# Patient Record
Sex: MALE | Race: WHITE | NOT HISPANIC OR LATINO | Employment: OTHER | URBAN - METROPOLITAN AREA
[De-identification: names, ages, dates, MRNs, and addresses within clinical notes are randomized per-mention and may not be internally consistent; named-entity substitution may affect disease eponyms.]

---

## 2017-01-09 ENCOUNTER — COMMUNICATION - HEALTHEAST (OUTPATIENT)
Dept: FAMILY MEDICINE | Facility: CLINIC | Age: 67
End: 2017-01-09

## 2017-01-09 DIAGNOSIS — G47.00 INSOMNIA, UNSPECIFIED: ICD-10-CM

## 2017-03-24 ENCOUNTER — OFFICE VISIT - HEALTHEAST (OUTPATIENT)
Dept: FAMILY MEDICINE | Facility: CLINIC | Age: 67
End: 2017-03-24

## 2017-03-24 DIAGNOSIS — J30.9 ALLERGIC RHINITIS: ICD-10-CM

## 2017-03-24 DIAGNOSIS — E29.1 HYPOGONADISM IN MALE: ICD-10-CM

## 2017-03-24 DIAGNOSIS — R29.890 HEIGHT LOSS: ICD-10-CM

## 2017-03-24 DIAGNOSIS — Z00.00 ROUTINE GENERAL MEDICAL EXAMINATION AT A HEALTH CARE FACILITY: ICD-10-CM

## 2017-03-24 DIAGNOSIS — G47.00 INSOMNIA, UNSPECIFIED: ICD-10-CM

## 2017-03-24 DIAGNOSIS — N40.0 BPH (BENIGN PROSTATIC HYPERTROPHY): ICD-10-CM

## 2017-03-24 DIAGNOSIS — E78.5 HYPERLIPIDEMIA: ICD-10-CM

## 2017-03-24 DIAGNOSIS — I10 ESSENTIAL HYPERTENSION, BENIGN: ICD-10-CM

## 2017-03-24 LAB
CHOLEST SERPL-MCNC: 189 MG/DL
FASTING STATUS PATIENT QL REPORTED: YES
HDLC SERPL-MCNC: 57 MG/DL
LDLC SERPL CALC-MCNC: 109 MG/DL
PSA SERPL-MCNC: 3.3 NG/ML (ref 0–4.5)
TRIGL SERPL-MCNC: 117 MG/DL

## 2017-03-24 ASSESSMENT — MIFFLIN-ST. JEOR: SCORE: 1570.93

## 2017-04-09 ENCOUNTER — COMMUNICATION - HEALTHEAST (OUTPATIENT)
Dept: FAMILY MEDICINE | Facility: CLINIC | Age: 67
End: 2017-04-09

## 2017-04-09 DIAGNOSIS — G47.00 INSOMNIA, UNSPECIFIED: ICD-10-CM

## 2017-04-10 ENCOUNTER — COMMUNICATION - HEALTHEAST (OUTPATIENT)
Dept: FAMILY MEDICINE | Facility: CLINIC | Age: 67
End: 2017-04-10

## 2017-04-10 DIAGNOSIS — E78.5 HYPERLIPIDEMIA: ICD-10-CM

## 2017-04-12 ENCOUNTER — COMMUNICATION - HEALTHEAST (OUTPATIENT)
Dept: FAMILY MEDICINE | Facility: CLINIC | Age: 67
End: 2017-04-12

## 2017-04-13 RX ORDER — SIMVASTATIN 40 MG
TABLET ORAL
Qty: 15 TABLET | Refills: 3 | Status: SHIPPED | OUTPATIENT
Start: 2017-04-13 | End: 2023-10-20

## 2017-04-14 ENCOUNTER — COMMUNICATION - HEALTHEAST (OUTPATIENT)
Dept: FAMILY MEDICINE | Facility: CLINIC | Age: 67
End: 2017-04-14

## 2017-04-19 RX ORDER — SIMVASTATIN 40 MG
TABLET ORAL
Qty: 45 TABLET | Refills: 1 | Status: SHIPPED | OUTPATIENT
Start: 2017-04-19 | End: 2023-10-20

## 2017-06-27 ENCOUNTER — COMMUNICATION - HEALTHEAST (OUTPATIENT)
Dept: FAMILY MEDICINE | Facility: CLINIC | Age: 67
End: 2017-06-27

## 2017-06-27 DIAGNOSIS — G47.00 INSOMNIA: ICD-10-CM

## 2017-06-28 RX ORDER — MIRTAZAPINE 45 MG/1
TABLET, FILM COATED ORAL
Qty: 30 TABLET | Refills: 5 | Status: SHIPPED | OUTPATIENT
Start: 2017-06-28

## 2017-07-08 ENCOUNTER — COMMUNICATION - HEALTHEAST (OUTPATIENT)
Dept: FAMILY MEDICINE | Facility: CLINIC | Age: 67
End: 2017-07-08

## 2017-07-08 DIAGNOSIS — G47.00 INSOMNIA, UNSPECIFIED: ICD-10-CM

## 2017-07-08 RX ORDER — TRAZODONE HYDROCHLORIDE 100 MG/1
TABLET ORAL
Qty: 180 TABLET | Refills: 1 | Status: SHIPPED | OUTPATIENT
Start: 2017-07-08

## 2021-01-07 ENCOUNTER — APPOINTMENT (OUTPATIENT)
Dept: URBAN - METROPOLITAN AREA CLINIC 256 | Age: 71
Setting detail: DERMATOLOGY
End: 2021-01-07

## 2021-01-07 VITALS — HEIGHT: 68 IN | RESPIRATION RATE: 18 BRPM | WEIGHT: 175 LBS

## 2021-01-07 DIAGNOSIS — L11.1 TRANSIENT ACANTHOLYTIC DERMATOSIS [GROVER]: ICD-10-CM

## 2021-01-07 DIAGNOSIS — Z85.828 PERSONAL HISTORY OF OTHER MALIGNANT NEOPLASM OF SKIN: ICD-10-CM

## 2021-01-07 DIAGNOSIS — I87.2 VENOUS INSUFFICIENCY (CHRONIC) (PERIPHERAL): ICD-10-CM

## 2021-01-07 PROBLEM — L30.9 DERMATITIS, UNSPECIFIED: Status: ACTIVE | Noted: 2021-01-07

## 2021-01-07 PROCEDURE — OTHER COUNSELING: OTHER

## 2021-01-07 PROCEDURE — OTHER PRESCRIPTION: OTHER

## 2021-01-07 PROCEDURE — 99203 OFFICE O/P NEW LOW 30 MIN: CPT

## 2021-01-07 RX ORDER — FUROSEMIDE 20 MG/1
TABLET ORAL QD
Qty: 30 | Refills: 0 | Status: ERX | COMMUNITY
Start: 2021-01-07

## 2021-01-07 RX ORDER — TRIAMCINOLONE ACETONIDE 1 MG/G
CREAM TOPICAL BID
Qty: 1 | Refills: 1 | Status: ERX | COMMUNITY
Start: 2021-01-07

## 2021-01-07 RX ORDER — PREDNISONE 20 MG/1
20MG TABLET ORAL QD-BID
Qty: 21 | Refills: 0 | Status: ERX | COMMUNITY
Start: 2021-01-07

## 2021-01-07 ASSESSMENT — LOCATION SIMPLE DESCRIPTION DERM
LOCATION SIMPLE: LEFT SHOULDER
LOCATION SIMPLE: RIGHT PRETIBIAL REGION
LOCATION SIMPLE: ABDOMEN
LOCATION SIMPLE: RIGHT PRETIBIAL REGION

## 2021-01-07 ASSESSMENT — LOCATION DETAILED DESCRIPTION DERM
LOCATION DETAILED: PERIUMBILICAL SKIN
LOCATION DETAILED: LEFT POSTERIOR SHOULDER
LOCATION DETAILED: RIGHT DISTAL PRETIBIAL REGION
LOCATION DETAILED: RIGHT DISTAL PRETIBIAL REGION

## 2021-01-07 ASSESSMENT — LOCATION ZONE DERM
LOCATION ZONE: TRUNK
LOCATION ZONE: LEG
LOCATION ZONE: ARM
LOCATION ZONE: LEG

## 2021-01-21 ENCOUNTER — APPOINTMENT (OUTPATIENT)
Dept: URBAN - METROPOLITAN AREA CLINIC 256 | Age: 71
Setting detail: DERMATOLOGY
End: 2021-01-21

## 2021-01-21 VITALS — HEIGHT: 68 IN | RESPIRATION RATE: 16 BRPM | WEIGHT: 170 LBS

## 2021-01-21 DIAGNOSIS — I87.2 VENOUS INSUFFICIENCY (CHRONIC) (PERIPHERAL): ICD-10-CM

## 2021-01-21 DIAGNOSIS — L82.0 INFLAMED SEBORRHEIC KERATOSIS: ICD-10-CM

## 2021-01-21 DIAGNOSIS — Z85.828 PERSONAL HISTORY OF OTHER MALIGNANT NEOPLASM OF SKIN: ICD-10-CM

## 2021-01-21 PROCEDURE — 99213 OFFICE O/P EST LOW 20 MIN: CPT

## 2021-01-21 PROCEDURE — OTHER PRESCRIPTION: OTHER

## 2021-01-21 PROCEDURE — OTHER COUNSELING: OTHER

## 2021-01-21 RX ORDER — TRIAMCINOLONE ACETONIDE 1 MG/G
CREAM TOPICAL BID
Qty: 1 | Refills: 1 | Status: ERX

## 2021-01-21 ASSESSMENT — LOCATION DETAILED DESCRIPTION DERM
LOCATION DETAILED: RIGHT DISTAL PRETIBIAL REGION
LOCATION DETAILED: LEFT POSTERIOR SHOULDER
LOCATION DETAILED: LEFT RIB CAGE
LOCATION DETAILED: RIGHT DISTAL PRETIBIAL REGION

## 2021-01-21 ASSESSMENT — LOCATION ZONE DERM
LOCATION ZONE: ARM
LOCATION ZONE: LEG
LOCATION ZONE: TRUNK
LOCATION ZONE: LEG

## 2021-03-09 ENCOUNTER — RX ONLY (RX ONLY)
Age: 71
End: 2021-03-09

## 2021-03-09 RX ORDER — FUROSEMIDE 20 MG/1
TABLET ORAL QD
Qty: 90 | Refills: 1 | Status: ERX

## 2021-05-30 VITALS — HEIGHT: 69 IN | WEIGHT: 182 LBS | BODY MASS INDEX: 26.96 KG/M2

## 2021-06-02 ENCOUNTER — RECORDS - HEALTHEAST (OUTPATIENT)
Dept: ADMINISTRATIVE | Facility: CLINIC | Age: 71
End: 2021-06-02

## 2021-06-09 NOTE — PROGRESS NOTES
" /76  Pulse 84  Resp 16  Ht 5' 9\" (1.753 m)  Wt 182 lb (82.6 kg)  BMI 26.88 kg/m2    Lamine Samano is a 66 y.o. male here for physical.  He has no specific concerns about his health.  He is previously injured Achilles tendons limit his ability to ambulate.  He uses a cane or walker with any distance greater than 50 feet.  He is not doing much in the way of exercise.  We reviewed heart disease prevention and cancer detection.  He would like me to take a look at a couple of skin lesions.  He has had a tremor at times but there is no family history of tremor or Parkinson's.  Active Ambulatory Problems     Diagnosis Date Noted     Basal Cell Carcinoma Of The Skin      Hypogonadism in male      Seborrheic Dermatitis      Benign Essential Hypertension      Alcohol Abuse      Hyperlipidemia      Alcoholism      Male Erectile Disorder      Insomnia      Snoring (Symptom)      Dermatitis      Hyperglycemia      Benign Adenomatous Polyp Of The Large Intestine      Allergic Rhinitis      Fatigue      Palpitations      Abnormal Blood Chemistry      Resolved Ambulatory Problems     Diagnosis Date Noted     No Resolved Ambulatory Problems     Past Medical History:   Diagnosis Date     Achilles tendon rupture      Alcohol abuse      Allergic rhinitis      Basal cell carcinoma of deltoid region, left      ED (erectile dysfunction)      HTN (hypertension)      Hypercholesterolemia      Insomnia      Seborrheic dermatitis      Snoring      Past Surgical History:   Procedure Laterality Date     [place] REMOVAL OF SPERM DUCT(S)      Description: Surgery Of Male Genitalia Vasectomy;  Recorded: 06/07/2009;     Family History   Problem Relation Age of Onset     Diabetes type II Brother      Diabetes Brother        Current Outpatient Prescriptions:      cholecalciferol, vitamin D3, (VITAMIN D3) 2,000 unit cap, Take by mouth., Disp: , Rfl:      fluocinonide-emollient (FLUOCINONIDE-EMOLLIENT) 0.05 % Crea, Apply topically 2 (two) times " a day. Apply and rub in a thin film to affected areas.. Morning and Evening, Disp: , Rfl:      guaiFENesin ER (MUCINEX) 600 mg 12 hr tablet, Take 1,200 mg by mouth 2 (two) times a day., Disp: , Rfl:      mirtazapine (REMERON) 45 MG tablet, TAKE ONE TABLET BY MOUTH EVERY NIGHT AT BEDTIME, Disp: 30 tablet, Rfl: 6     omega 3-dha-epa-fish oil 1,200 (144-216) mg cap, Take 1 capsule by mouth daily., Disp: , Rfl:      potassium gluconate 550 mg (90 mg) tablet, Take by mouth., Disp: , Rfl:      simvastatin (ZOCOR) 40 MG tablet, TAKE 1/2 TABLET BY MOUTH EVERY NIGHT AT BEDTIME, Disp: 15 tablet, Rfl: 3     traZODone (DESYREL) 100 MG tablet, TAKE 2 TABLETS BY MOUTH EVERY NIGHT AT BEDTIME, Disp: 180 tablet, Rfl: 0     aspirin 81 MG EC tablet, Take 81 mg by mouth daily., Disp: , Rfl:      fluocinonide (LIDEX) 0.05 % external solution, Apply topically 2 (two) times a day. Apply sparingly to scalp, Disp: , Rfl:      HYPOCHLOROUS ACID/SODIUM CHLOR (AVENOVA TOP), Apply topically., Disp: , Rfl:      triamcinolone (NASACORT AQ) 55 mcg nasal inhaler, 1 spray into each nostril 2 (two) times a day., Disp: 1 Inhaler, Rfl: 2  Allergies   Allergen Reactions     Levofloxacin Swelling     Penicillins Hives     Immunization History   Administered Date(s) Administered     Influenza high dose, seasonal 09/25/2015, 11/18/2016     Influenza, inj, historic 10/12/2007, 10/14/2008     Influenza, seasonal,quad inj 6-35 mos 09/14/2010, 10/06/2011, 10/25/2013, 10/30/2014     Pneumo Conj 13-V (2010&after) 09/25/2015     Pneumo Polysac 23-V 11/18/2016     Td, historic 11/11/2008     Tdap 11/11/2008     ZOSTER 11/01/2011     Social History     Social History     Marital status:      Spouse name: N/A     Number of children: N/A     Years of education: N/A     Occupational History     Not on file.     Social History Main Topics     Smoking status: Former Smoker     Smokeless tobacco: Not on file     Alcohol use Not on file     Drug use: Not on file      Sexual activity: Not on file     Other Topics Concern     Not on file     Social History Narrative     Habits: He is a non-smoker who does use alcohol 3 a day and caffeine 2 a day  His review of systems all otherwise negative    General Appearance:    Alert, cooperative, no distress, appears stated age   Head:    Normocephalic, without obvious abnormality, atraumatic   Eyes:    PERRL, conjunctiva/corneas clear, EOM's intact, fundi     benign, both eyes glasses are worn      Ears:    Normal TM's and external ear canals, both ears   Nose:   Nares normal, septum midline, mucosa normal, no drainage    or sinus tenderness   Throat:   Lips, mucosa, and tongue normal; teeth and gums normal   Neck:   Supple, symmetrical, trachea midline, no adenopathy;        thyroid:  No enlargement/tenderness/nodules; no carotid    bruit or JVD   Back:     Symmetric, no curvature, ROM normal, no CVA tenderness   Lungs:     Clear to auscultation bilaterally, respirations unlabored   Chest wall:    No tenderness or deformity   Heart:    Regular rate and rhythm, S1 and S2 normal, no murmur, rub   or gallop   Abdomen:     Soft, non-tender, bowel sounds active all four quadrants,     no masses, no organomegaly   Genitalia:    Normal male without hernia or other abnormality    Rectal:    Normal tone, mildly enlarged prostate otherwise normal    Extremities:   Extremities normal, atraumatic, no cyanosis or edema   Pulses:   2+ and symmetric all extremities   Skin:  on his mid back he has a 4 mm slightly raised well circumscribed skin lesion that looks like a seborrheic keratosis it does have 2 different pigment colors within the same overall and had asked him to watch this and we discussed the idea of freezing or biopsy if there is any change.  On his right mid axillary chest he has a 6 x 7 mm slightly raised slightly irregular appearing skin lesion that looks like again a seborrheic keratoses and on his right sideburn area he has a 2 mm  slightly dry appearing skin lesion that could be an actinic keratoses but he tells me it response to 1% hydrocortisone cream and so it could be a seborrheic dermatitis area as well and we discussed potential treatments of all of these areas should they grow or change.  The remainder of his skin exam appears normal.     Lymph nodes:   Cervical, supraclavicular, and axillary nodes normal   Neurologic:   CNII-XII intact. Normal strength, sensation and reflexes       throughout he has no significant tremor here today and while his gait is slightly shuffling it seems more due to his Achilles then anything else.    Labs pending    Assessment: General physical hypertension hypercholesterolemia insomnia hypogastric gonad is on BPH allergic rhinitis height loss history of tremor multiple skin lesion?  Seborrheic keratoses    Plan: We will call him with labs when available I do asked him to reduce his alcohol intake we discussed the role of exercise potential need for seeing a neurologist if his tremor becomes more consistent and/or problematic and we discussed the idea of further treatment of his skin lesions as needed he will follow-up here in 6 months otherwise as needed

## 2022-02-11 ENCOUNTER — APPOINTMENT (OUTPATIENT)
Dept: URBAN - METROPOLITAN AREA CLINIC 256 | Age: 72
Setting detail: DERMATOLOGY
End: 2022-02-11

## 2022-02-11 VITALS — WEIGHT: 175 LBS | HEIGHT: 67 IN | RESPIRATION RATE: 16 BRPM

## 2022-02-11 DIAGNOSIS — L20.9 ATOPIC DERMATITIS, UNSPECIFIED: ICD-10-CM

## 2022-02-11 DIAGNOSIS — Z85.828 PERSONAL HISTORY OF OTHER MALIGNANT NEOPLASM OF SKIN: ICD-10-CM

## 2022-02-11 PROCEDURE — OTHER ADDITIONAL NOTES: OTHER

## 2022-02-11 PROCEDURE — 96372 THER/PROPH/DIAG INJ SC/IM: CPT

## 2022-02-11 PROCEDURE — OTHER COUNSELING: OTHER

## 2022-02-11 PROCEDURE — 99212 OFFICE O/P EST SF 10 MIN: CPT | Mod: 25

## 2022-02-11 PROCEDURE — OTHER PRESCRIPTION: OTHER

## 2022-02-11 PROCEDURE — OTHER INTRAMUSCULAR KENALOG: OTHER

## 2022-02-11 RX ORDER — TRIAMCINOLONE ACETONIDE 1 MG/G
0.1% CREAM TOPICAL BID
Qty: 454 | Refills: 3 | Status: ERX | COMMUNITY
Start: 2022-02-11

## 2022-02-11 ASSESSMENT — LOCATION SIMPLE DESCRIPTION DERM
LOCATION SIMPLE: RIGHT LOWER BACK
LOCATION SIMPLE: LEFT SHOULDER
LOCATION SIMPLE: RIGHT UPPER BACK

## 2022-02-11 ASSESSMENT — LOCATION DETAILED DESCRIPTION DERM
LOCATION DETAILED: RIGHT MID-UPPER BACK
LOCATION DETAILED: RIGHT INFERIOR LATERAL LOWER BACK
LOCATION DETAILED: LEFT POSTERIOR SHOULDER

## 2022-02-11 ASSESSMENT — LOCATION ZONE DERM
LOCATION ZONE: TRUNK
LOCATION ZONE: ARM

## 2022-02-11 NOTE — PROCEDURE: ADDITIONAL NOTES
Detail Level: Zone
Render Risk Assessment In Note?: no
Additional Notes: Patient informed to follow up in 2-4 weeks if he is not improved and would like to consider starting a systemic medication for dermatitis. If improved, patient to follow up in one year.

## 2023-03-23 ENCOUNTER — APPOINTMENT (OUTPATIENT)
Dept: URBAN - METROPOLITAN AREA CLINIC 256 | Age: 73
Setting detail: DERMATOLOGY
End: 2023-03-23

## 2023-03-23 VITALS — HEIGHT: 68 IN | WEIGHT: 170 LBS

## 2023-03-23 DIAGNOSIS — L29.8 OTHER PRURITUS: ICD-10-CM

## 2023-03-23 DIAGNOSIS — D69.2 OTHER NONTHROMBOCYTOPENIC PURPURA: ICD-10-CM

## 2023-03-23 DIAGNOSIS — F42.4 EXCORIATION (SKIN-PICKING) DISORDER: ICD-10-CM

## 2023-03-23 PROCEDURE — OTHER ORDER TESTS: OTHER

## 2023-03-23 PROCEDURE — OTHER COUNSELING: OTHER

## 2023-03-23 PROCEDURE — 99214 OFFICE O/P EST MOD 30 MIN: CPT

## 2023-03-23 PROCEDURE — OTHER MIPS QUALITY: OTHER

## 2023-03-23 ASSESSMENT — LOCATION DETAILED DESCRIPTION DERM
LOCATION DETAILED: LEFT DISTAL DORSAL FOREARM
LOCATION DETAILED: RIGHT DISTAL DORSAL FOREARM

## 2023-03-23 ASSESSMENT — LOCATION ZONE DERM: LOCATION ZONE: ARM

## 2023-03-23 ASSESSMENT — LOCATION SIMPLE DESCRIPTION DERM
LOCATION SIMPLE: RIGHT FOREARM
LOCATION SIMPLE: LEFT FOREARM

## 2023-03-23 NOTE — HPI: RASH
Is This A New Presentation, Or A Follow-Up?: Rash
Additional History: The patient discussed that this rash started in December. He was prescribed triamcinolone 0.5% cream by his primary however he does not use it often. The patient discussed that he has minor Parkinson's, no stress, no signs of depression, no recent illnesses, no memory loss or signs of dementia. He is here for evaluation and management. \\n\\n\\nDecember. Got triamcinlonen0.5 cream from primary but doesn’t fuse a lot.

## 2023-03-23 NOTE — PROCEDURE: MIPS QUALITY
Quality 130: Documentation Of Current Medications In The Medical Record: Current Medications Documented
Quality 226: Preventive Care And Screening: Tobacco Use: Screening And Cessation Intervention: Patient screened for tobacco use and is an ex/non-smoker
Quality 111:Pneumonia Vaccination Status For Older Adults: Pneumococcal vaccine (PPSV23) administered on or after patient’s 60th birthday and before the end of the measurement period
Detail Level: Detailed
Quality 431: Preventive Care And Screening: Unhealthy Alcohol Use - Screening: Patient not identified as an unhealthy alcohol user when screened for unhealthy alcohol use using a systematic screening method

## 2023-03-27 ENCOUNTER — RX ONLY (RX ONLY)
Age: 73
End: 2023-03-27

## 2023-03-27 RX ORDER — MINOCYCLINE HYDROCHLORIDE 50 MG/1
50MG CAPSULE ORAL BID
Qty: 56 | Refills: 0 | Status: ERX | COMMUNITY
Start: 2023-03-27

## 2023-05-09 ENCOUNTER — APPOINTMENT (OUTPATIENT)
Dept: URBAN - METROPOLITAN AREA CLINIC 256 | Age: 73
Setting detail: DERMATOLOGY
End: 2023-05-10

## 2023-05-09 VITALS — WEIGHT: 160 LBS | HEIGHT: 68 IN

## 2023-05-09 DIAGNOSIS — L29.8 OTHER PRURITUS: ICD-10-CM

## 2023-05-09 DIAGNOSIS — F42.4 EXCORIATION (SKIN-PICKING) DISORDER: ICD-10-CM

## 2023-05-09 PROCEDURE — OTHER PRESCRIPTION: OTHER

## 2023-05-09 PROCEDURE — 99214 OFFICE O/P EST MOD 30 MIN: CPT

## 2023-05-09 PROCEDURE — OTHER COUNSELING: OTHER

## 2023-05-09 PROCEDURE — OTHER MIPS QUALITY: OTHER

## 2023-05-09 PROCEDURE — OTHER PRESCRIPTION MEDICATION MANAGEMENT: OTHER

## 2023-05-09 PROCEDURE — OTHER ADDITIONAL NOTES: OTHER

## 2023-05-09 RX ORDER — MUPIROCIN 20 MG/G
OINTMENT TOPICAL
Qty: 44 | Refills: 1 | Status: ERX | COMMUNITY
Start: 2023-05-09

## 2023-05-09 RX ORDER — HYDROXYZINE HYDROCHLORIDE 25 MG/1
TABLET, FILM COATED ORAL
Qty: 60 | Refills: 1 | Status: ERX | COMMUNITY
Start: 2023-05-09

## 2023-05-09 ASSESSMENT — LOCATION DETAILED DESCRIPTION DERM
LOCATION DETAILED: RIGHT MEDIAL UPPER BACK
LOCATION DETAILED: RIGHT MID-UPPER BACK

## 2023-05-09 ASSESSMENT — LOCATION SIMPLE DESCRIPTION DERM: LOCATION SIMPLE: RIGHT UPPER BACK

## 2023-05-09 ASSESSMENT — LOCATION ZONE DERM: LOCATION ZONE: TRUNK

## 2023-05-09 NOTE — PROCEDURE: ADDITIONAL NOTES
Additional Notes: - I discussed with patient that his condition is internal rather than dermatologic. I clarified that his lesions are secondary rather than primary meaning that there is something going on internally that is causing his extreme itch which is then causing the lesions on his skin. \\n- I reassured patient that his Parkinsons disease is most likely not contributing his Pruritus. \\n- As a result, I advised patient that a skin biopsy is not necessary. \\n- I order the following blood tests for the patient to have done - TSH, CMP, CBC and ESR. \\n- The plan is for patient to have the blood tests done and then follow up with his primary care provider, Dr. Man. I plan to send over todays visit note.\\nI discussed with the wife and patient that we sometimes see the problem with depression, anxiety or dementia and there was no sign of these per the couple today
Detail Level: Simple
Render Risk Assessment In Note?: no
Additional Notes: - Advised patient to apply Vaseline on scratch wounds to help alleviate discomfort.\\n- Advised patient to apply Sarna anti-itch lotion all over body as a cooling agent to help alleviate discomfort and itch.

## 2023-05-09 NOTE — PROCEDURE: PRESCRIPTION MEDICATION MANAGEMENT
Plan: - I advised patient to apply Mupirocin 2% topical ointment to treat open wounds and to take Hydroxyzine 25mg twice daily to treat patient itchiness.\\n- Patient revealed during intake that her was prescribed Prednisone by his primary care provider. I advised patient to take 1 tablet of Xyzal daily as a substitute. \\n- Patient will follow this regimen until blood work results come in. From there, in collaboration with Dr. Man, treatment can be modified.
Detail Level: Zone
Initiate Treatment: Mupirocin 2 % topical ointment - Apply to open wounds twice daily.\\nHydroxyzine HCl 25 mg tablet - Take two tablets twice daily by mouth at night
Render In Strict Bullet Format?: No

## 2023-05-09 NOTE — PROCEDURE: MIPS QUALITY
Quality 431: Preventive Care And Screening: Unhealthy Alcohol Use - Screening: Patient not identified as an unhealthy alcohol user when screened for unhealthy alcohol use using a systematic screening method
Quality 110: Preventive Care And Screening: Influenza Immunization: Influenza Immunization Administered during Influenza season
Quality 130: Documentation Of Current Medications In The Medical Record: Current Medications Documented
Detail Level: Detailed
Quality 111:Pneumonia Vaccination Status For Older Adults: Patient received any pneumococcal conjugate or polysaccharide vaccine on or after their 60th birthday and before the end of the measurement period
Quality 226: Preventive Care And Screening: Tobacco Use: Screening And Cessation Intervention: Patient screened for tobacco use and is an ex/non-smoker
Quality 47: Advance Care Plan: Advance Care Planning discussed and documented; advance care plan or surrogate decision maker documented in the medical record.

## 2023-10-20 ENCOUNTER — HOSPITAL ENCOUNTER (INPATIENT)
Facility: CLINIC | Age: 73
LOS: 1 days | Discharge: HOME | DRG: 812 | End: 2023-10-21
Attending: EMERGENCY MEDICINE | Admitting: HOSPITALIST
Payer: COMMERCIAL

## 2023-10-20 ENCOUNTER — APPOINTMENT (OUTPATIENT)
Dept: CT IMAGING | Facility: CLINIC | Age: 73
DRG: 812 | End: 2023-10-20
Attending: EMERGENCY MEDICINE
Payer: COMMERCIAL

## 2023-10-20 DIAGNOSIS — D64.9 SYMPTOMATIC ANEMIA: Primary | ICD-10-CM

## 2023-10-20 DIAGNOSIS — R42 LIGHTHEADEDNESS: ICD-10-CM

## 2023-10-20 LAB
ABO/RH(D): NORMAL
ALBUMIN SERPL BCG-MCNC: 3.3 G/DL (ref 3.5–5.2)
ALP SERPL-CCNC: 105 U/L (ref 40–129)
ALT SERPL W P-5'-P-CCNC: <5 U/L (ref 0–70)
ANION GAP SERPL CALCULATED.3IONS-SCNC: 16 MMOL/L (ref 7–15)
ANTIBODY SCREEN: NEGATIVE
AST SERPL W P-5'-P-CCNC: 25 U/L (ref 0–45)
ATRIAL RATE - MUSE: 62 BPM
BASO+EOS+MONOS # BLD AUTO: ABNORMAL 10*3/UL
BASO+EOS+MONOS # BLD AUTO: ABNORMAL 10*3/UL
BASO+EOS+MONOS NFR BLD AUTO: ABNORMAL %
BASO+EOS+MONOS NFR BLD AUTO: ABNORMAL %
BASOPHILS # BLD AUTO: 0.1 10E3/UL (ref 0–0.2)
BASOPHILS # BLD AUTO: 0.1 10E3/UL (ref 0–0.2)
BASOPHILS NFR BLD AUTO: 1 %
BASOPHILS NFR BLD AUTO: 2 %
BILIRUB DIRECT SERPL-MCNC: <0.2 MG/DL (ref 0–0.3)
BILIRUB SERPL-MCNC: 0.4 MG/DL
BLD PROD TYP BPU: NORMAL
BLD PROD TYP BPU: NORMAL
BLOOD COMPONENT TYPE: NORMAL
BLOOD COMPONENT TYPE: NORMAL
BUN SERPL-MCNC: 9.4 MG/DL (ref 8–23)
CALCIUM SERPL-MCNC: 8.4 MG/DL (ref 8.8–10.2)
CHLORIDE SERPL-SCNC: 100 MMOL/L (ref 98–107)
CODING SYSTEM: NORMAL
CODING SYSTEM: NORMAL
CREAT SERPL-MCNC: 0.55 MG/DL (ref 0.67–1.17)
CROSSMATCH: NORMAL
CROSSMATCH: NORMAL
DEPRECATED HCO3 PLAS-SCNC: 20 MMOL/L (ref 22–29)
DIASTOLIC BLOOD PRESSURE - MUSE: NORMAL MMHG
EGFRCR SERPLBLD CKD-EPI 2021: >90 ML/MIN/1.73M2
EOSINOPHIL # BLD AUTO: 1.1 10E3/UL (ref 0–0.7)
EOSINOPHIL # BLD AUTO: 1.2 10E3/UL (ref 0–0.7)
EOSINOPHIL NFR BLD AUTO: 18 %
EOSINOPHIL NFR BLD AUTO: 25 %
ERYTHROCYTE [DISTWIDTH] IN BLOOD BY AUTOMATED COUNT: 17.6 % (ref 10–15)
ERYTHROCYTE [DISTWIDTH] IN BLOOD BY AUTOMATED COUNT: 20.2 % (ref 10–15)
FERRITIN SERPL-MCNC: 514 NG/ML (ref 31–409)
GLUCOSE SERPL-MCNC: 105 MG/DL (ref 70–99)
HCT VFR BLD AUTO: 19.2 % (ref 40–53)
HCT VFR BLD AUTO: 28.6 % (ref 40–53)
HGB BLD-MCNC: 5.9 G/DL (ref 13.3–17.7)
HGB BLD-MCNC: 9.3 G/DL (ref 13.3–17.7)
IMM GRANULOCYTES # BLD: 0 10E3/UL
IMM GRANULOCYTES # BLD: 0 10E3/UL
IMM GRANULOCYTES NFR BLD: 0 %
IMM GRANULOCYTES NFR BLD: 0 %
INTERPRETATION ECG - MUSE: NORMAL
IRON BINDING CAPACITY (ROCHE): ABNORMAL
IRON SATN MFR SERPL: ABNORMAL %
IRON SERPL-MCNC: 220 UG/DL (ref 61–157)
ISSUE DATE AND TIME: NORMAL
ISSUE DATE AND TIME: NORMAL
LYMPHOCYTES # BLD AUTO: 1.1 10E3/UL (ref 0.8–5.3)
LYMPHOCYTES # BLD AUTO: 1.4 10E3/UL (ref 0.8–5.3)
LYMPHOCYTES NFR BLD AUTO: 22 %
LYMPHOCYTES NFR BLD AUTO: 22 %
MCH RBC QN AUTO: 26.5 PG (ref 26.5–33)
MCH RBC QN AUTO: 27.6 PG (ref 26.5–33)
MCHC RBC AUTO-ENTMCNC: 30.7 G/DL (ref 31.5–36.5)
MCHC RBC AUTO-ENTMCNC: 32.5 G/DL (ref 31.5–36.5)
MCV RBC AUTO: 85 FL (ref 78–100)
MCV RBC AUTO: 86 FL (ref 78–100)
MONOCYTES # BLD AUTO: 0.4 10E3/UL (ref 0–1.3)
MONOCYTES # BLD AUTO: 0.7 10E3/UL (ref 0–1.3)
MONOCYTES NFR BLD AUTO: 11 %
MONOCYTES NFR BLD AUTO: 9 %
NEUTROPHILS # BLD AUTO: 2.1 10E3/UL (ref 1.6–8.3)
NEUTROPHILS # BLD AUTO: 2.9 10E3/UL (ref 1.6–8.3)
NEUTROPHILS NFR BLD AUTO: 43 %
NEUTROPHILS NFR BLD AUTO: 47 %
NRBC # BLD AUTO: 0 10E3/UL
NRBC # BLD AUTO: 0 10E3/UL
NRBC BLD AUTO-RTO: 0 /100
NRBC BLD AUTO-RTO: 0 /100
P AXIS - MUSE: 87 DEGREES
PLATELET # BLD AUTO: 346 10E3/UL (ref 150–450)
PLATELET # BLD AUTO: 383 10E3/UL (ref 150–450)
POTASSIUM SERPL-SCNC: 3.7 MMOL/L (ref 3.4–5.3)
PR INTERVAL - MUSE: 188 MS
PROT SERPL-MCNC: 5.9 G/DL (ref 6.4–8.3)
QRS DURATION - MUSE: 94 MS
QT - MUSE: 436 MS
QTC - MUSE: 442 MS
R AXIS - MUSE: -68 DEGREES
RBC # BLD AUTO: 2.23 10E6/UL (ref 4.4–5.9)
RBC # BLD AUTO: 3.37 10E6/UL (ref 4.4–5.9)
RETICS # AUTO: 0.02 10E6/UL (ref 0.03–0.1)
RETICS # AUTO: 0.03 10E6/UL (ref 0.03–0.1)
RETICS/RBC NFR AUTO: 0.8 % (ref 0.5–2)
RETICS/RBC NFR AUTO: 1.9 % (ref 0.5–2)
SODIUM SERPL-SCNC: 136 MMOL/L (ref 135–145)
SPECIMEN EXPIRATION DATE: NORMAL
SYSTOLIC BLOOD PRESSURE - MUSE: NORMAL MMHG
T AXIS - MUSE: -14 DEGREES
UNIT ABO/RH: NORMAL
UNIT ABO/RH: NORMAL
UNIT NUMBER: NORMAL
UNIT NUMBER: NORMAL
UNIT STATUS: NORMAL
UNIT STATUS: NORMAL
UNIT TYPE ISBT: 6200
UNIT TYPE ISBT: 6200
VENTRICULAR RATE- MUSE: 62 BPM
VIT B12 SERPL-MCNC: 283 PG/ML (ref 232–1245)
WBC # BLD AUTO: 4.8 10E3/UL (ref 4–11)
WBC # BLD AUTO: 6.4 10E3/UL (ref 4–11)

## 2023-10-20 PROCEDURE — 258N000003 HC RX IP 258 OP 636: Performed by: HOSPITALIST

## 2023-10-20 PROCEDURE — 99223 1ST HOSP IP/OBS HIGH 75: CPT | Performed by: HOSPITALIST

## 2023-10-20 PROCEDURE — 74177 CT ABD & PELVIS W/CONTRAST: CPT

## 2023-10-20 PROCEDURE — 80053 COMPREHEN METABOLIC PANEL: CPT | Performed by: EMERGENCY MEDICINE

## 2023-10-20 PROCEDURE — 93005 ELECTROCARDIOGRAM TRACING: CPT

## 2023-10-20 PROCEDURE — 82248 BILIRUBIN DIRECT: CPT | Performed by: HOSPITALIST

## 2023-10-20 PROCEDURE — 250N000013 HC RX MED GY IP 250 OP 250 PS 637: Performed by: HOSPITALIST

## 2023-10-20 PROCEDURE — 36415 COLL VENOUS BLD VENIPUNCTURE: CPT | Performed by: EMERGENCY MEDICINE

## 2023-10-20 PROCEDURE — 83550 IRON BINDING TEST: CPT | Performed by: HOSPITALIST

## 2023-10-20 PROCEDURE — 86850 RBC ANTIBODY SCREEN: CPT | Performed by: EMERGENCY MEDICINE

## 2023-10-20 PROCEDURE — P9016 RBC LEUKOCYTES REDUCED: HCPCS | Performed by: EMERGENCY MEDICINE

## 2023-10-20 PROCEDURE — 120N000001 HC R&B MED SURG/OB

## 2023-10-20 PROCEDURE — C9113 INJ PANTOPRAZOLE SODIUM, VIA: HCPCS | Mod: JZ | Performed by: HOSPITALIST

## 2023-10-20 PROCEDURE — 36415 COLL VENOUS BLD VENIPUNCTURE: CPT | Performed by: HOSPITALIST

## 2023-10-20 PROCEDURE — 99285 EMERGENCY DEPT VISIT HI MDM: CPT | Mod: 25

## 2023-10-20 PROCEDURE — 82607 VITAMIN B-12: CPT | Performed by: HOSPITALIST

## 2023-10-20 PROCEDURE — 85025 COMPLETE CBC W/AUTO DIFF WBC: CPT | Performed by: HOSPITALIST

## 2023-10-20 PROCEDURE — 86923 COMPATIBILITY TEST ELECTRIC: CPT | Performed by: EMERGENCY MEDICINE

## 2023-10-20 PROCEDURE — 85049 AUTOMATED PLATELET COUNT: CPT | Performed by: EMERGENCY MEDICINE

## 2023-10-20 PROCEDURE — 250N000011 HC RX IP 250 OP 636: Performed by: EMERGENCY MEDICINE

## 2023-10-20 PROCEDURE — HZ2ZZZZ DETOXIFICATION SERVICES FOR SUBSTANCE ABUSE TREATMENT: ICD-10-PCS | Performed by: HOSPITALIST

## 2023-10-20 PROCEDURE — 250N000011 HC RX IP 250 OP 636: Mod: JZ | Performed by: HOSPITALIST

## 2023-10-20 PROCEDURE — 86901 BLOOD TYPING SEROLOGIC RH(D): CPT | Performed by: EMERGENCY MEDICINE

## 2023-10-20 PROCEDURE — 82728 ASSAY OF FERRITIN: CPT | Performed by: HOSPITALIST

## 2023-10-20 PROCEDURE — 36430 TRANSFUSION BLD/BLD COMPNT: CPT

## 2023-10-20 PROCEDURE — 85045 AUTOMATED RETICULOCYTE COUNT: CPT | Performed by: HOSPITALIST

## 2023-10-20 PROCEDURE — 250N000009 HC RX 250: Performed by: EMERGENCY MEDICINE

## 2023-10-20 RX ORDER — ROSUVASTATIN CALCIUM 5 MG/1
5 TABLET, COATED ORAL EVERY MORNING
Status: DISCONTINUED | OUTPATIENT
Start: 2023-10-21 | End: 2023-10-21 | Stop reason: HOSPADM

## 2023-10-20 RX ORDER — MIRTAZAPINE 15 MG/1
45 TABLET, FILM COATED ORAL AT BEDTIME
Status: DISCONTINUED | OUTPATIENT
Start: 2023-10-20 | End: 2023-10-21 | Stop reason: HOSPADM

## 2023-10-20 RX ORDER — CARBIDOPA/LEVODOPA 25MG-250MG
1 TABLET ORAL 2 TIMES DAILY
Status: DISCONTINUED | OUTPATIENT
Start: 2023-10-20 | End: 2023-10-21 | Stop reason: HOSPADM

## 2023-10-20 RX ORDER — MULTIPLE VITAMINS W/ MINERALS TAB 9MG-400MCG
1 TAB ORAL DAILY
Status: DISCONTINUED | OUTPATIENT
Start: 2023-10-20 | End: 2023-10-21 | Stop reason: HOSPADM

## 2023-10-20 RX ORDER — ROSUVASTATIN CALCIUM 5 MG/1
5 TABLET, COATED ORAL EVERY MORNING
COMMUNITY
Start: 2023-02-19

## 2023-10-20 RX ORDER — TRAZODONE HYDROCHLORIDE 100 MG/1
200 TABLET ORAL
Status: DISCONTINUED | OUTPATIENT
Start: 2023-10-20 | End: 2023-10-21 | Stop reason: HOSPADM

## 2023-10-20 RX ORDER — FOLIC ACID 1 MG/1
1 TABLET ORAL DAILY
Status: DISCONTINUED | OUTPATIENT
Start: 2023-10-20 | End: 2023-10-20

## 2023-10-20 RX ORDER — DOXEPIN HYDROCHLORIDE 10 MG/1
10 CAPSULE ORAL AT BEDTIME
COMMUNITY
Start: 2023-08-15

## 2023-10-20 RX ORDER — IOPAMIDOL 755 MG/ML
75 INJECTION, SOLUTION INTRAVASCULAR ONCE
Status: COMPLETED | OUTPATIENT
Start: 2023-10-20 | End: 2023-10-20

## 2023-10-20 RX ORDER — SODIUM CHLORIDE 9 MG/ML
INJECTION, SOLUTION INTRAVENOUS CONTINUOUS
Status: DISCONTINUED | OUTPATIENT
Start: 2023-10-20 | End: 2023-10-21 | Stop reason: HOSPADM

## 2023-10-20 RX ORDER — MUPIROCIN 20 MG/G
OINTMENT TOPICAL DAILY
COMMUNITY
Start: 2023-09-06

## 2023-10-20 RX ORDER — CARBIDOPA/LEVODOPA 25MG-250MG
1 TABLET ORAL 2 TIMES DAILY
COMMUNITY
Start: 2021-11-22

## 2023-10-20 RX ORDER — ACETAMINOPHEN 325 MG/1
650 TABLET ORAL EVERY 6 HOURS PRN
Status: DISCONTINUED | OUTPATIENT
Start: 2023-10-20 | End: 2023-10-21 | Stop reason: HOSPADM

## 2023-10-20 RX ORDER — ACETAMINOPHEN 650 MG/1
650 SUPPOSITORY RECTAL EVERY 6 HOURS PRN
Status: DISCONTINUED | OUTPATIENT
Start: 2023-10-20 | End: 2023-10-21 | Stop reason: HOSPADM

## 2023-10-20 RX ORDER — HYDROXYZINE HYDROCHLORIDE 25 MG/1
50 TABLET, FILM COATED ORAL AT BEDTIME
Status: DISCONTINUED | OUTPATIENT
Start: 2023-10-20 | End: 2023-10-21 | Stop reason: HOSPADM

## 2023-10-20 RX ORDER — LISINOPRIL 20 MG/1
1 TABLET ORAL DAILY
COMMUNITY
Start: 2022-04-10

## 2023-10-20 RX ORDER — ATENOLOL 25 MG/1
25 TABLET ORAL DAILY
Status: DISCONTINUED | OUTPATIENT
Start: 2023-10-20 | End: 2023-10-21 | Stop reason: HOSPADM

## 2023-10-20 RX ORDER — CARBIDOPA/LEVODOPA 25MG-250MG
1 TABLET ORAL DAILY PRN
COMMUNITY

## 2023-10-20 RX ORDER — ONDANSETRON 2 MG/ML
4 INJECTION INTRAMUSCULAR; INTRAVENOUS EVERY 6 HOURS PRN
Status: DISCONTINUED | OUTPATIENT
Start: 2023-10-20 | End: 2023-10-21 | Stop reason: HOSPADM

## 2023-10-20 RX ORDER — HYDROXYZINE HYDROCHLORIDE 50 MG/1
50 TABLET, FILM COATED ORAL AT BEDTIME
COMMUNITY
Start: 2023-08-28

## 2023-10-20 RX ORDER — DOXEPIN HYDROCHLORIDE 10 MG/1
10 CAPSULE ORAL AT BEDTIME
Status: DISCONTINUED | OUTPATIENT
Start: 2023-10-20 | End: 2023-10-21 | Stop reason: HOSPADM

## 2023-10-20 RX ORDER — CARBIDOPA/LEVODOPA 25MG-250MG
1 TABLET ORAL DAILY PRN
Status: DISCONTINUED | OUTPATIENT
Start: 2023-10-20 | End: 2023-10-21 | Stop reason: HOSPADM

## 2023-10-20 RX ORDER — FOLIC ACID 1 MG/1
1 TABLET ORAL DAILY
COMMUNITY
Start: 2022-06-09

## 2023-10-20 RX ORDER — IBUPROFEN 200 MG
200 TABLET ORAL EVERY 8 HOURS PRN
COMMUNITY

## 2023-10-20 RX ORDER — FOLIC ACID 1 MG/1
1 TABLET ORAL DAILY
Status: DISCONTINUED | OUTPATIENT
Start: 2023-10-20 | End: 2023-10-21 | Stop reason: HOSPADM

## 2023-10-20 RX ORDER — ATENOLOL 25 MG/1
1 TABLET ORAL DAILY
COMMUNITY
Start: 2022-04-10

## 2023-10-20 RX ORDER — ONDANSETRON 4 MG/1
4 TABLET, ORALLY DISINTEGRATING ORAL EVERY 6 HOURS PRN
Status: DISCONTINUED | OUTPATIENT
Start: 2023-10-20 | End: 2023-10-21 | Stop reason: HOSPADM

## 2023-10-20 RX ADMIN — MULTIPLE VITAMINS W/ MINERALS TAB 1 TABLET: TAB at 17:33

## 2023-10-20 RX ADMIN — PANTOPRAZOLE SODIUM 40 MG: 40 INJECTION, POWDER, FOR SOLUTION INTRAVENOUS at 19:46

## 2023-10-20 RX ADMIN — MIRTAZAPINE 45 MG: 15 TABLET, FILM COATED ORAL at 21:07

## 2023-10-20 RX ADMIN — ATENOLOL 25 MG: 25 TABLET ORAL at 19:42

## 2023-10-20 RX ADMIN — CARBIDOPA AND LEVODOPA 1 TABLET: 25; 250 TABLET ORAL at 19:46

## 2023-10-20 RX ADMIN — THIAMINE HCL TAB 100 MG 100 MG: 100 TAB at 17:34

## 2023-10-20 RX ADMIN — FOLIC ACID 1 MG: 1 TABLET ORAL at 17:34

## 2023-10-20 RX ADMIN — SODIUM CHLORIDE 62 ML: 9 INJECTION, SOLUTION INTRAVENOUS at 11:01

## 2023-10-20 RX ADMIN — IOPAMIDOL 75 ML: 755 INJECTION, SOLUTION INTRAVENOUS at 11:00

## 2023-10-20 RX ADMIN — SODIUM CHLORIDE: 9 INJECTION, SOLUTION INTRAVENOUS at 17:25

## 2023-10-20 RX ADMIN — DOXEPIN HYDROCHLORIDE 10 MG: 10 CAPSULE ORAL at 21:07

## 2023-10-20 RX ADMIN — HYDROXYZINE HYDROCHLORIDE 50 MG: 25 TABLET, FILM COATED ORAL at 21:07

## 2023-10-20 ASSESSMENT — ACTIVITIES OF DAILY LIVING (ADL)
ADLS_ACUITY_SCORE: 39
ADLS_ACUITY_SCORE: 35
ADLS_ACUITY_SCORE: 35
ADLS_ACUITY_SCORE: 30
ADLS_ACUITY_SCORE: 35
ADLS_ACUITY_SCORE: 37
ADLS_ACUITY_SCORE: 39

## 2023-10-20 NOTE — ED NOTES
[facility]  ED Nurse Handoff Report    ED Chief complaint: Abnormal Labs, Dizziness, and Fatigue      ED Diagnosis:   Final diagnoses:   Symptomatic anemia   Lightheadedness       Code Status: Full Code    Allergies:   Allergies   Allergen Reactions    Levofloxacin Swelling    Penicillins Hives       Patient Story:   72 year old male with a history of Parkinson's, hypertension, and basal cell carcinoma who presents with fatigue, dizziness, and anemia.  Patient reports that his hemoglobin has dropped from 11-6.9 in the past month (but dropped from 7.1-6.9 over the past 24 hours), resulting in his presentation to the ED ED with hopes of receiving a blood transfusion.  Over this time, he has been experiencing the symptoms along with increased weakness, resulting in more frequent falls.  He specifies that he fell 4 times in a day about a week and a half ago.  He has also been losing weight as he has been experiencing a decreased appetite.      Focused Assessment:  Alert and oriented times 4  Dizzy with activity - generalized weakness  VSS    Treatments and/or interventions provided:   Unit of PRBC  Patient's response to treatments and/or interventions:       To be done/followed up on inpatient unit:    Unit pack PRBC     Does this patient have any cognitive concerns?: none    Activity level - Baseline/Home:  Independent  Activity Level - Current:   Stand with Assist    Patient's Preferred language: English   Needed?: No    Isolation: None  Infection: Not Applicable  Patient tested for COVID 19 prior to admission: NO  Bariatric?: No    Vital Signs:   Vitals:    10/20/23 1026 10/20/23 1041 10/20/23 1123 10/20/23 1130   BP: 121/65 122/60 124/55 130/66   Pulse: 68 65 72 67   Resp: 15 15 16 11   Temp:   98.1  F (36.7  C)    TempSrc:       SpO2: 96% 95%  95%   Weight:       Height:           Cardiac Rhythm:Cardiac Rhythm: Normal sinus rhythm    Was the PSS-3 completed:   Yes  What interventions are  required if any?               Family Comments:   Wife at bedside  OBS brochure/video discussed/provided to patient/family: No              Name of person given brochure if not patient:                 Relationship to patient:       For the majority of the shift this patient's behavior was Green.   Behavioral interventions performed were none.    ED NURSE PHONE NUMBER:   [phone]

## 2023-10-20 NOTE — PHARMACY-ADMISSION MEDICATION HISTORY
Pharmacist Admission Medication History    Admission medication history is complete. The information provided in this note is only as accurate as the sources available at the time of the update.    Information Source(s): Patient and CareEverywhere/SureScripts via in-person    Pertinent Information: Per patient, Lisinopril was put on hold 10/11/2023    Changes made to PTA medication list:  Added: hydroxyzine, lisinopril, mupirocin, prilosec, doxepain, sinemet, atenolol, crestor, ibuprofen, FA  Deleted: lidex. Guaifenesin, avenova top, asa, fish oil, simvastatin  Changed: K gluconate, vit d    Medication Affordability:  Not including over the counter (OTC) medications, was there a time in the past 3 months when you did not take your medications as prescribed because of cost?: No    Allergies reviewed with patient and updates made in EHR: yes    Medication History Completed By: Victor M Leslie RPH 10/20/2023 2:06 PM    PTA Med List   Medication Sig Last Dose    atenolol (TENORMIN) 25 MG tablet Take 1 tablet by mouth daily 10/19/2023    carbidopa-levodopa (SINEMET)  MG tablet Take 1 tablet by mouth 2 times daily 10/20/2023 at am    carbidopa-levodopa (SINEMET)  MG tablet Take 1 tablet by mouth daily as needed (in the afternoon if needed) Unknown    cholecalciferol, vitamin D3, (VITAMIN D3) 2,000 unit cap Take 2,000 Units by mouth daily 10/19/2023    doxepin (SINEQUAN) 10 MG capsule Take 10 mg by mouth at bedtime 10/19/2023    folic acid (FOLVITE) 1 MG tablet Take 1 mg by mouth daily 10/19/2023    hydrOXYzine (ATARAX) 50 MG tablet Take 50 mg by mouth at bedtime 10/19/2023    ibuprofen (ADVIL/MOTRIN) 200 MG tablet Take 200 mg by mouth every 8 hours as needed for pain Past Week    mirtazapine (REMERON) 45 MG tablet [MIRTAZAPINE (REMERON) 45 MG TABLET] TAKE ONE TABLET BY MOUTH EVERY NIGHT AT BEDTIME 10/19/2023 at hs    mupirocin (BACTROBAN) 2 % external ointment Apply topically daily Apply to wound until resolved      omeprazole (PRILOSEC) 20 MG DR capsule Take 20 mg by mouth daily 10/19/2023    potassium gluconate 550 mg (90 mg) tablet Take 90 mg by mouth daily 10/19/2023    rosuvastatin (CRESTOR) 5 MG tablet Take 5 mg by mouth every morning 10/19/2023    traZODone (DESYREL) 100 MG tablet [TRAZODONE (DESYREL) 100 MG TABLET] TAKE 2 TABLETS BY MOUTH EVERY NIGHT AT BEDTIME 10/19/2023

## 2023-10-20 NOTE — ED PROVIDER NOTES
History     Chief Complaint:  Abnormal Labs, Dizziness, and Fatigue       HPI   Lamine Samano is a 72 year old male with a history of Parkinson's, hypertension, and basal cell carcinoma who presents with fatigue, dizziness, and exertional shortness of breath.  Patient reports that his hemoglobin was noted to be low in clinic yesterday (6.9, previously 7.1 on 10/12 and 11.9 on 5/9/2023); referred to the ED for transfusion and evaluation.  Over the last several months, he has been experiencing the symptoms along with increased weakness, resulting in more frequent falls.  He specifies that he fell 4 times in a day about a week and a half ago without significant trauma.  He has also been losing weight as he has been experiencing a decreased appetite.  He also endorses dry heaving yesterday and shortness of breath upon exertion, but not at rest.  No black/bloody stool, fevers, diarrhea, chest pain, shortness of breath, abdominal pain.  He is not anticoagulated. Uses a walker and electric scooter at baseline.  Lives at home independently with his wife.        Independent Historian:   None - Patient Only    Review of External Notes:   N/A    Medications:    Amlodipine  Atenolol  Doxepin  Hydroxyzine  Omeprazole  Lisinopril  Rosuvastatin    Past Medical History:    Basal Cell Carcinoma  Hypogonadism in male  Seborrheic Dermatitis  Hypertension  Alcohol Abuse  Hyperlipidemia  Alcoholism  Insomnia  Dermatitis  Hyperglycemia  Benign Adenomatous Polyp Of The Large Intestine  Allergic Rhinitis    Past Surgical History:    Sperm duct removal     Physical Exam   Patient Vitals for the past 24 hrs:   BP Temp Temp src Pulse Resp SpO2 Height Weight   10/20/23 1041 122/60 -- -- 65 15 95 % -- --   10/20/23 1026 121/65 -- -- 68 15 96 % -- --   10/20/23 1015 118/62 -- -- 68 22 91 % -- --   10/20/23 1011 118/62 -- -- 68 10 94 % -- --   10/20/23 1000 130/60 -- -- 67 18 -- -- --   10/20/23 0945 119/66 -- -- 73 (!) 41 -- -- --   10/20/23  "0913 119/47 97.4  F (36.3  C) Oral 70 18 97 % 1.727 m (5' 8\") 67.6 kg (149 lb)        Physical Exam  General: Alert and cooperative with exam. Patient in mild distress. Normal mentation.  Head:  Scalp is NC/AT  Eyes:  No scleral icterus, PERRL  ENT:  The external nose and ears are normal. The oropharynx is normal and without erythema; mucus membranes are moist. Uvula midline, no evidence of deep space infection.  Neck:  Normal range of motion without rigidity.  CV:  Regular rate and rhythm  Resp:  Breath sounds are clear bilaterally    Non-labored, no retractions or accessory muscle use  GI:  Abdomen is soft, no distension, no tenderness. No peritoneal signs  Rectal: Scant light green stool without evidence of bleeding  MS:  No lower extremity edema   Skin:  Warm and dry, No rash or lesions noted. Pale appearance.   Neuro: Oriented x 3. No gross motor deficits.    Emergency Department Course   ECG  ECG results from 10/20/23   EKG 12 lead     Value    Systolic Blood Pressure     Diastolic Blood Pressure     Ventricular Rate 62    Atrial Rate 62    IN Interval 188    QRS Duration 94        QTc 442    P Axis 87    R AXIS -68    T Axis -14    Interpretation ECG      Sinus rhythm with marked sinus arrhythmia  Left axis deviation  Anterior infarct , age undetermined  Abnormal ECG  No previous ECGs available  Read by: Dr. Ayush Harper, DO       Imaging:  CT Abdomen Pelvis w Contrast   Final Result   IMPRESSION:    1.  No convincing acute process within the abdomen or pelvis to   explain the patient's bleeding.   2.  Apparent thickening of the ascending colon is likely related to   underdistention. Correlation with colonoscopy could be considered as   clinically indicated.   3.  Right peripheral zone prostate hyperdense nodular focus (1.6 cm)   could reflect a clinically significant prostate cancer. Recommend   correlation with prostate-specific antigen and/or MRI.   4.  Diffuse hepatic steatosis and " morphologic changes suggestive of   underlying chronic hepatocellular disease. Indeterminate bilobar   hypoechoic applications (up to 2 cm). Correlate with outside imaging   or consider further characterization with MRI.   5.  Subacute right posterior 10-12th rib and right L1 transverse   process fractures with evidence of healing.   6.  Borderline gallbladder wall thickening is favored reactive.   Correlate with symptoms.      EH PERDOMO MD            SYSTEM ID:  [id number]         Report per radiology    Laboratory:  Labs Ordered and Resulted from Time of ED Arrival to Time of ED Departure   BASIC METABOLIC PANEL - Abnormal       Result Value    Sodium 136      Potassium 3.7      Chloride 100      Carbon Dioxide (CO2) 20 (*)     Anion Gap 16 (*)     Urea Nitrogen 9.4      Creatinine 0.55 (*)     GFR Estimate >90      Calcium 8.4 (*)     Glucose 105 (*)    CBC WITH PLATELETS AND DIFFERENTIAL - Abnormal    WBC Count 4.8      RBC Count 2.23 (*)     Hemoglobin 5.9 (*)     Hematocrit 19.2 (*)     MCV 86      MCH 26.5      MCHC 30.7 (*)     RDW 20.2 (*)     Platelet Count 383      % Neutrophils 43      % Lymphocytes 22      % Monocytes 9      Mids % (Monos, Eos, Basos)        % Eosinophils 25      % Basophils 1      % Immature Granulocytes 0      NRBCs per 100 WBC 0      Absolute Neutrophils 2.1      Absolute Lymphocytes 1.1      Absolute Monocytes 0.4      Mids Abs (Monos, Eos, Basos)        Absolute Eosinophils 1.2 (*)     Absolute Basophils 0.1      Absolute Immature Granulocytes 0.0      Absolute NRBCs 0.0     OCCULT BLOOD STOOL   TYPE AND SCREEN, ADULT    ABO/RH(D) A POS      Antibody Screen Negative      SPECIMEN EXPIRATION DATE [id number]     PREPARE RED BLOOD CELLS (UNIT)    Blood Component Type Red Blood Cells      Product Code K2401F99      Unit Status Ready for issue      Unit Number [id number]      CROSSMATCH Compatible      CODING SYSTEM [id number]     PREPARE RED BLOOD CELLS (UNIT)    Blood  Component Type Red Blood Cells      Product Code U5938D61      Unit Status Ready for issue      Unit Number [id number]      CROSSMATCH Compatible      CODING SYSTEM [id number]     PREPARE RED BLOOD CELLS (UNIT)   TRANSFUSE RED BLOOD CELLS (UNIT)   ABO/RH TYPE AND SCREEN        Emergency Department Course & Assessments:    Interventions:  Medications   iopamidol (ISOVUE-370) solution 75 mL (75 mLs Intravenous $Given 10/20/23 1100)   Saline (62 mLs Intravenous $Given 10/20/23 1101)      Independent Interpretation (X-rays, CTs, rhythm strip):  None    Assessments/Consultations/Discussion of Management or Tests:  ED Course as of 10/20/23 1109   Fri Oct 20, 2023   0936 Dr. Harper's evaluation   1108 Consult with Dr. Mcneal, hospitalist     Social Determinants of Health affecting care:   None    Disposition:  The patient was admitted to the hospital under the care of Dr. Mcneal.     Impression & Plan      Medical Decision Making:  Patient is a 72-year-old male who was referred to the ED for anemia.  Patient's medical history and records reviewed.  On evaluation patient did note generalized weakness and exertional shortness of breath which is consistent with symptomatic anemia.  EKG is without evidence of arrhythmia or acute ischemia or infarction.  Patient denies chest pain.  No evidence of infectious etiology.  Abdominal exam benign and CT abdomen pelvis without significant acute findings; see incidental findings above.  Labs notable for anemia (hemoglobin 5.9).  Patient consented to and was initiated on blood transfusion in the ED.  I will admit to observation with the hospital service for continued evaluation and care.  Rectal exam without evidence of GI bleed.    Diagnosis:    ICD-10-CM    1. Symptomatic anemia  D64.9       2. Lightheadedness  R42            Scribe Disclosure:  TRINITY POE, am serving as a scribe at 9:21 AM on 10/20/2023 to document services personally performed by Ayush Harper  DO [place] based on my observations and the provider's statements to me.     10/20/2023   Ayush Harper DO O'Neill, Christopher Warren, DO  10/20/23 7202

## 2023-10-20 NOTE — ED TRIAGE NOTES
1.5 months of weakness and dizziness. Had lab work yesterday and had HgB of 6.9. patient's doctor told him to go to ED for blood transfusion and CT scan of abdomen. Patient denies taking blood thinners. He denies abdominal pain, n/v/d or blood in stools or dark colored stools. Patient pale. He reports multiple falls recently d/t the dizziness.     Triage Assessment (Adult)       Row Name 10/20/23 0914          Triage Assessment    Airway WDL WDL        Respiratory WDL    Respiratory WDL WDL        Skin Circulation/Temperature WDL    Skin Circulation/Temperature WDL WDL        Cardiac WDL    Cardiac WDL WDL        Peripheral/Neurovascular WDL    Peripheral Neurovascular WDL WDL        Cognitive/Neuro/Behavioral WDL    Cognitive/Neuro/Behavioral WDL WDL

## 2023-10-20 NOTE — H&P
[facility]    History and Physical - Hospitalist Service       Date of Admission:  10/20/2023    Assessment & Plan      Lamine Samano is a 72 year old male admitted on 10/20/2023.     Acute on chronic normocytic symptomatic anemia  Rule out acute blood loss anemia  Presents with 1-1/2 months of weakness and progressive dyspnea on exertion.  Hemoglobin 6.9 at outside physician yesterday now down to 5.9 in the emergency department  No obvious external signs of blood loss and not on blood thinners  Currently symptoms as described above culminating in multiple falls  Last colonoscopy was 2018 potentially per the patient Sees [facility]  Plan  - Admit to inpatient given suspected greater than 2 midnights  - CT abdomen without obvious acute issues, does note ascending colonic thickening likely related to underdistention  - GI consult for consideration of EGD and colonoscopy  - Repeat hemoglobin this evening after blood transfusions and transfuse if less than 7  - IVF at 100 an hour normal saline  - Repeat hemoglobin in the morning  -Physical therapy consultation  -Note anion gap is elevated we will check a lactic acid  -Anemia alert work-up including vitamin B12, folic acid, iron studies, ferritin, peripheral blood smear, reticulocyte count, LDH and haptoglobin    Dysphagia  Significant issues with swallowing and foods getting stuck in this patient with chronic alcohol use  Plan  - SLp and GI consultations  - will make NPO at midnight  - continue his omeprazole    1.6 hyperdense prostate nodule, could be prostate cancer  Plan  [facility] and [facility]. Discussed with patient and his wife    Anion gap metabolic acidosis  - As outlined we will check lactic    Steatohepatitis  Plan  - likely from alcohol use    Alcohol use  Drinks 3 scotch and water daily  States he does not become shaky when he stops drinking  Plan  - ciwa and oral vitamins  - hold on benzodiazepine until clear e/o withdrawal  witnessed    Chronic medical conditions  Parkinson's disease: Resume medications once verified by pharmacy              Diet:  can start regular after dysphagia evaluation otherwise npo at midnight  DVT Prophylaxis: Pneumatic Compression Devices  Daiz Catheter: Not present  Lines: None     Cardiac Monitoring: None  Code Status:  full code    Clinically Significant Risk Factors Present on Admission          # Hypocalcemia: Lowest Ca = 8.4 mg/dL in last 2 days, will monitor and replace as appropriate     # Hypoalbuminemia: Lowest albumin = 3.3 g/dL at 10/20/2023  9:43 AM, will monitor as appropriate   # Drug Induced Platelet Defect: home medication list includes an antiplatelet medication   # Hypertension: Noted on problem list                 Disposition Plan      Expected Discharge Date: 10/22/2023                  Quincy Mcneal DO  Hospitalist Service  [facility]  Securely message with [employer] (more info)  Text page via [employer] Paging/Directory     ______________________________________________________________________    Chief Complaint   Dyspnea on exertion    History is obtained from the patient    History of Present Illness   Lamine Samano is a 72 year old male with past medical history of Parkinson's disease, chronic alcohol use, and hypertension who presents with 1-1/2 months of worsening dizziness lightheadedness and dyspnea on exertion.  The patient notes during this time he has had difficulty swallowing solid foods with him being stuck.  Thus he notes poor oral intake and has lost about 15 to 20 pounds.  Because of his lightheadedness he has had multiple falls but has not had any episodes of loss of consciousness.  He notes no significant trauma from these falls.    He was evaluated by his primary care doctor yesterday who noted hemoglobin was 6.9.  He was advised to come to the emergency department.  In the emergency department he was noted to have a hemoglobin of 5.9.  He  reports oral intake and just not feeling well becoming dizzy even with just sitting up.    He does continue to have 3 glasses of scotch and water daily but does note that he does not become shaky if he does not drink alcohol.      Past Medical History    Past Medical History:   Diagnosis Date    Parkinson disease        Past Surgical History   Past Surgical History:   Procedure Laterality Date    REMOVAL OF SPERM DUCT(S)      Description: Surgery Of Male Genitalia Vasectomy;  Recorded: 06/07/2009;       Prior to Admission Medications   Prior to Admission Medications   Prescriptions Last Dose Informant Patient Reported? Taking?   HYPOCHLOROUS ACID/SODIUM CHLOR (AVENOVA TOP)   Yes No   Sig: [HYPOCHLOROUS ACID/SODIUM CHLOR (AVENOVA TOP)] Apply topically.   aspirin 81 MG EC tablet   Yes No   Sig: [ASPIRIN 81 MG EC TABLET] Take 81 mg by mouth daily.   cholecalciferol, vitamin D3, (VITAMIN D3) 2,000 unit cap   Yes No   Sig: [CHOLECALCIFEROL, VITAMIN D3, (VITAMIN D3) 2,000 UNIT CAP] Take by mouth.   fluocinonide (LIDEX) 0.05 % external solution   Yes No   Sig: [FLUOCINONIDE (LIDEX) 0.05 % EXTERNAL SOLUTION] Apply topically 2 (two) times a day. Apply sparingly to scalp   fluocinonide-emollient (FLUOCINONIDE-EMOLLIENT) 0.05 % Crea   Yes No   Sig: [FLUOCINONIDE-EMOLLIENT (FLUOCINONIDE-EMOLLIENT) 0.05 % CREA] Apply topically 2 (two) times a day. Apply and rub in a thin film to affected areas.. Morning and Evening   guaiFENesin ER (MUCINEX) 600 mg 12 hr tablet   Yes No   Sig: [GUAIFENESIN ER (MUCINEX) 600 MG 12 HR TABLET] Take 1,200 mg by mouth 2 (two) times a day.   mirtazapine (REMERON) 45 MG tablet   No No   Sig: [MIRTAZAPINE (REMERON) 45 MG TABLET] TAKE ONE TABLET BY MOUTH EVERY NIGHT AT BEDTIME   omega 3-dha-epa-fish oil 1,200 (144-216) mg cap   Yes No   Sig: [OMEGA 3-DHA-EPA-FISH OIL 1,200 (144-216) MG CAP] Take 1 capsule by mouth daily.   potassium gluconate 550 mg (90 mg) tablet   Yes No   Sig: [POTASSIUM GLUCONATE 550  MG (90 MG) TABLET] Take by mouth.   simvastatin (ZOCOR) 40 MG tablet   No No   Sig: [SIMVASTATIN (ZOCOR) 40 MG TABLET] TAKE 1/2 TABLET BY MOUTH EVERY NIGHT AT BEDTIME   simvastatin (ZOCOR) 40 MG tablet   No No   Sig: [SIMVASTATIN (ZOCOR) 40 MG TABLET] TAKE 1/2 TABLET BY MOUTH EVERY NIGHT AT BEDTIME   traZODone (DESYREL) 100 MG tablet   No No   Sig: [TRAZODONE (DESYREL) 100 MG TABLET] TAKE 2 TABLETS BY MOUTH EVERY NIGHT AT BEDTIME      Facility-Administered Medications: None        Review of Systems    The 10 point Review of Systems is negative other than noted in the HPI or here.     Social History   I have reviewed this patient's social history and updated it with pertinent information if needed.  Social History     Tobacco Use    Smoking status: Former         Family History   I have reviewed this patient's family history and updated it with pertinent information if needed.  Family History   Problem Relation Age of Onset    Diabetes Type 2  Brother     Diabetes Brother          Allergies   Allergies   Allergen Reactions    Levofloxacin Swelling    Penicillins Hives        Physical Exam   Vital Signs: Temp: 98.2  F (36.8  C) Temp src: Oral BP: 130/88 Pulse: 73   Resp: (!) 31 SpO2: 93 % O2 Device: None (Room air)    Weight: 149 lbs 0 oz    General:        Alert and cooperative with exam. Patient in mild distress. Normal mentation.  ENT:              The external nose and ears are normal. The oropharynx is normal and without erythema; mucus membranes are moist. Uvula midline, no evidence of deep space infection.  Neck:            Normal range of motion without rigidity.  CV:                Regular rate and rhythm                        No pathologic murmur   Resp:            Breath sounds are clear bilaterally                        Non-labored, no retractions or accessory muscle use  GI:                 Abdomen is soft, no distension, no tenderness. No peritoneal signs  MS:                No lower extremity edema    Skin:             Warm and dry, No rash or lesions noted. Pale appearance.   Neuro:          Oriented x 3. No gross motor deficits.    Medical Decision Making       70 MINUTES SPENT BY ME on the date of service doing chart review, history, exam, documentation & further activities per the note.      Data     I have personally reviewed the following data over the past 24 hrs:    4.8  \   5.9 (LL)   / 383     136 100 9.4 /  105 (H)   3.7 20 (L) 0.55 (L) \     ALT: <5 AST: 25 AP: 105 TBILI: 0.4   ALB: 3.3 (L) TOT PROTEIN: 5.9 (L) LIPASE: N/A       Imaging results reviewed over the past 24 hrs:   Recent Results (from the past 24 hour(s))   CT Abdomen Pelvis w Contrast    Narrative    CT ABDOMEN AND PELVIS WITH CONTRAST 10/20/2023 11:08 AM    CLINICAL HISTORY: Significant drop in hemoglobin, evaluate for  intra-abdominal pathology.    TECHNIQUE: CT scan of the abdomen and pelvis was performed following  injection of IV contrast. Multiplanar reformats were obtained. Dose  reduction techniques were used.  CONTRAST: 75mL Isovue-370    COMPARISON: None available    FINDINGS:   LOWER CHEST: Trace bilateral pleural effusions. Mild subpleural  bibasilar reticulation suggestive of mild fibrosis.    HEPATOBILIARY: Diffuse hepatic steatosis with slightly nodular  contour. Indeterminate hypoechoic observations in segment 4b (2 cm;  4/60), segment 3 (1 cm; 4/60) and segment 5 (1 cm; 4/54). Gallbladder  is unremarkable.    PANCREAS: No significant mass, duct dilatation, or inflammatory  change.    SPLEEN: Normal size.    ADRENAL GLANDS: No significant nodules.    KIDNEYS/BLADDER: Tiny right renal cyst. No collecting system  dilatation. Urinary bladder is unremarkable.    BOWEL: Colonic diverticulosis without evidence of diverticulitis.  Apparent thickening of the ascending colon is most likely related to  underdistention. No bowel obstruction.    PELVIC ORGANS: Focal hyperdensity within the right prostate peripheral  zone (1.6 cm;  4/196).    ADDITIONAL FINDINGS: No ascites or fluid collection. No enlarged lymph  node. Severe atherosclerosis. Portal veins are patent.    MUSCULOSKELETAL: No aggressive osseous lesion. Subacute right  posterior 10-12th rib fractures, and right L1 transverse process  fracture with evidence of healing.      Impression    IMPRESSION:   1.  No convincing acute process within the abdomen or pelvis to  explain the patient's bleeding.  2.  Apparent thickening of the ascending colon is likely related to  underdistention. Correlation with colonoscopy could be considered as  clinically indicated.  3.  Right peripheral zone prostate hyperdense nodular focus (1.6 cm)  could reflect a clinically significant prostate cancer. Recommend  correlation with prostate-specific antigen and/or MRI.  4.  Subacute right posterior 10-12th rib and right L1 transverse  process fractures with evidence of healing.

## 2023-10-21 ENCOUNTER — APPOINTMENT (OUTPATIENT)
Dept: SPEECH THERAPY | Facility: CLINIC | Age: 73
DRG: 812 | End: 2023-10-21
Attending: HOSPITALIST
Payer: COMMERCIAL

## 2023-10-21 ENCOUNTER — APPOINTMENT (OUTPATIENT)
Dept: PHYSICAL THERAPY | Facility: CLINIC | Age: 73
DRG: 812 | End: 2023-10-21
Attending: HOSPITALIST
Payer: COMMERCIAL

## 2023-10-21 VITALS
HEIGHT: 68 IN | OXYGEN SATURATION: 94 % | SYSTOLIC BLOOD PRESSURE: 117 MMHG | TEMPERATURE: 98.4 F | RESPIRATION RATE: 16 BRPM | HEART RATE: 56 BPM | DIASTOLIC BLOOD PRESSURE: 65 MMHG | WEIGHT: 149 LBS | BODY MASS INDEX: 22.58 KG/M2

## 2023-10-21 LAB
ALBUMIN SERPL BCG-MCNC: 3.2 G/DL (ref 3.5–5.2)
ALP SERPL-CCNC: 107 U/L (ref 40–129)
ALT SERPL W P-5'-P-CCNC: <5 U/L (ref 0–70)
ANION GAP SERPL CALCULATED.3IONS-SCNC: 13 MMOL/L (ref 7–15)
AST SERPL W P-5'-P-CCNC: 23 U/L (ref 0–45)
BILIRUB SERPL-MCNC: 1.1 MG/DL
BUN SERPL-MCNC: 8.9 MG/DL (ref 8–23)
CALCIUM SERPL-MCNC: 8 MG/DL (ref 8.8–10.2)
CHLORIDE SERPL-SCNC: 103 MMOL/L (ref 98–107)
CREAT SERPL-MCNC: 0.63 MG/DL (ref 0.67–1.17)
DEPRECATED HCO3 PLAS-SCNC: 22 MMOL/L (ref 22–29)
EGFRCR SERPLBLD CKD-EPI 2021: >90 ML/MIN/1.73M2
FOLATE SERPL-MCNC: >40 NG/ML (ref 4.6–34.8)
GLUCOSE SERPL-MCNC: 108 MG/DL (ref 70–99)
HGB BLD-MCNC: 9 G/DL (ref 13.3–17.7)
POTASSIUM SERPL-SCNC: 3.6 MMOL/L (ref 3.4–5.3)
PROT SERPL-MCNC: 5.8 G/DL (ref 6.4–8.3)
PSA SERPL DL<=0.01 NG/ML-MCNC: 1.1 NG/ML (ref 0–6.5)
SODIUM SERPL-SCNC: 138 MMOL/L (ref 135–145)

## 2023-10-21 PROCEDURE — 92610 EVALUATE SWALLOWING FUNCTION: CPT | Mod: GN

## 2023-10-21 PROCEDURE — 84153 ASSAY OF PSA TOTAL: CPT | Performed by: HOSPITALIST

## 2023-10-21 PROCEDURE — C9113 INJ PANTOPRAZOLE SODIUM, VIA: HCPCS | Mod: JZ | Performed by: HOSPITALIST

## 2023-10-21 PROCEDURE — 250N000011 HC RX IP 250 OP 636: Mod: JZ | Performed by: HOSPITALIST

## 2023-10-21 PROCEDURE — 97161 PT EVAL LOW COMPLEX 20 MIN: CPT | Mod: GP

## 2023-10-21 PROCEDURE — 97116 GAIT TRAINING THERAPY: CPT | Mod: GP

## 2023-10-21 PROCEDURE — 82746 ASSAY OF FOLIC ACID SERUM: CPT | Performed by: HOSPITALIST

## 2023-10-21 PROCEDURE — 92526 ORAL FUNCTION THERAPY: CPT | Mod: GN

## 2023-10-21 PROCEDURE — 250N000013 HC RX MED GY IP 250 OP 250 PS 637: Performed by: HOSPITALIST

## 2023-10-21 PROCEDURE — 36415 COLL VENOUS BLD VENIPUNCTURE: CPT | Performed by: HOSPITALIST

## 2023-10-21 PROCEDURE — 99239 HOSP IP/OBS DSCHRG MGMT >30: CPT | Performed by: HOSPITALIST

## 2023-10-21 PROCEDURE — 85018 HEMOGLOBIN: CPT | Performed by: HOSPITALIST

## 2023-10-21 PROCEDURE — 80053 COMPREHEN METABOLIC PANEL: CPT | Performed by: HOSPITALIST

## 2023-10-21 PROCEDURE — 258N000003 HC RX IP 258 OP 636: Performed by: HOSPITALIST

## 2023-10-21 RX ORDER — LANOLIN ALCOHOL/MO/W.PET/CERES
1000 CREAM (GRAM) TOPICAL DAILY
Qty: 30 TABLET | Refills: 0 | Status: SHIPPED | OUTPATIENT
Start: 2023-10-21

## 2023-10-21 RX ADMIN — PANTOPRAZOLE SODIUM 40 MG: 40 INJECTION, POWDER, FOR SOLUTION INTRAVENOUS at 08:53

## 2023-10-21 RX ADMIN — THIAMINE HCL TAB 100 MG 100 MG: 100 TAB at 08:50

## 2023-10-21 RX ADMIN — ROSUVASTATIN CALCIUM 5 MG: 5 TABLET, FILM COATED ORAL at 08:50

## 2023-10-21 RX ADMIN — SODIUM CHLORIDE: 9 INJECTION, SOLUTION INTRAVENOUS at 02:43

## 2023-10-21 RX ADMIN — CARBIDOPA AND LEVODOPA 1 TABLET: 25; 250 TABLET ORAL at 08:50

## 2023-10-21 RX ADMIN — MULTIPLE VITAMINS W/ MINERALS TAB 1 TABLET: TAB at 08:51

## 2023-10-21 RX ADMIN — FOLIC ACID 1 MG: 1 TABLET ORAL at 08:51

## 2023-10-21 ASSESSMENT — ACTIVITIES OF DAILY LIVING (ADL)
ADLS_ACUITY_SCORE: 30

## 2023-10-21 NOTE — PLAN OF CARE
Summary: Dizziness, weakness, Hgb 5.9  DATE & TIME: 10/20/23 3695-9174    Cognitive Concerns/ Orientation : A&Ox4, calm and cooperative.    BEHAVIOR & AGGRESSION TOOL COLOR: Green   CIWA SCORE: 0, 0   ABNL VS/O2: VSS, on RA   MOBILITY: SBA, walker. Ambulating to bathroom.   PAIN MANAGMENT: Denies   DIET: Passed bed side swallow eval, advanced to Reg per notes, NPO at midnight. No issues of swallowing problems this shift. Speech consulted.   BOWEL/BLADDER: WDL, using bathroom. Stool sampled needed for occult blood.   ABNL LAB/BG: Hgb 9.3, Crea 0.55, Anion 16, Prot 5.9, Iron 220  DRAIN/DEVICES: PIV LFA infusing NS at 100mL/hr.   TELEMETRY RHYTHM: NA   SKIN: PAle in color, scattered scabs on BLE   TESTS/PROCEDURES: AM labs   D/C DAY/GOALS/PLACE: Pending work up. GI consulted. Speech to see about reported swallowing issues.   OTHER IMPORTANT INFO: Bed alarm on for safety. Rounded on freq. Calls as needed.

## 2023-10-21 NOTE — PLAN OF CARE
Goal Outcome Evaluation:  DATE & TIME:10/21/23 6366-0131  Cognitive Concerns/ Orientation : A&Ox4, calm and cooperative.    BEHAVIOR & AGGRESSION TOOL COLOR: Green   CIWA SCORE: 0,0  ABNL VS/O2: VSS, on RA , Herve , HR in 50s- 60s  MOBILITY: SBA, with walker. (Uses walker and scooter at home)  PAIN MANAGMENT: Denies   DIET: NPO.for possible EGD. Speech consulted.   BOWEL/BLADDER: Continent. Needs stool sample for occult blood.   ABNL LAB/BG: Hgb 9.0,on Q 12 hr checks  DRAIN/DEVICES: PIV LFA SL.   TELEMETRY RHYTHM: NA   SKIN: Pale, scattered scabs and bruises on extremities, dry/peeling/cracked feet  TESTS/PROCEDURES;none this shift.  D/C DAY/GOALS/PLACE:possible discharge today if tolerates diet and seen by speech.  OTHER IMPORTANT INFO: no  signs of bleeding .Denies lightheadedness, SO.        Discharge    Patient discharged to Home via Taxi with spouse  Care plan note;Done  Listed belongings gathered and given to patient (including from security/pharmacy). yes  Care Plan and Patient education resolved: yes  Prescriptions if needed, hard copies sent with patient  yes  Medication Bin checked and emptied on discharge yes  SW/care coordinator/charge RN aware of discharge: yes

## 2023-10-21 NOTE — DISCHARGE SUMMARY
[facility]  Hospitalist Discharge Summary      Date of Admission:  10/20/2023  Date of Discharge:  10/21/2023  Discharging Provider: Quincy Mcneal DO  Discharge Service: Hospitalist Service    Discharge Diagnoses   Acute on chronic normocytic symptomatic anemia     Clinically Significant Risk Factors          Follow-ups Needed After Discharge   Follow-up Appointments     Follow-up and recommended labs and tests       Follow up with primary care provider, DELANEY FAM, within 7 days to   evaluate medication change and for hospital follow- up.  The following   labs/tests are recommended: repeat cbc. Please ask your primary doctor for   a hematology referral through the [facility]      Follow-up with [facility] for outpatient EGD and colonoscopy as outlined. Call   their office if you dont hear from them by Wednesday            Unresulted Labs Ordered in the Past 30 Days of this Admission       Date and Time Order Name Status Description    10/21/2023  5:00 AM Folate In process     10/20/2023  6:00 PM Bld morphology pathology review In process         These results will be followed up by PCP    Discharge Disposition   Discharged to home  Condition at discharge: Stable    Hospital Course     Acute on chronic normocytic symptomatic anemia  Presents with 1-1/2 months of weakness and progressive dyspnea on exertion.  Hemoglobin 6.9 at outside physician yesterday now down to 5.9 in the emergency department  No obvious external signs of blood loss and not on blood thinners  Currently symptoms as described above culminating in multiple falls  Last colonoscopy was 2016 potentially per the patient Sees [facility]  Labs were not consistent with iron deficiency anemia with an elevated iron and ferritin, could be anemia of chronic disease  Low normal vitamin b12  GI consulted and recommended outpatient egd and colonoscopy given no evidence of bleeding and stable hemoglobin in the 9s after 2  units of blood. Patient requesting discharge, and cleared with PT  LFTs were within normal limits  Patient requested discharge  Plan  - GI planning outpatient EGD and colonoscopy  -- take vitamin b12 on discharge  - patient would like to follow-up with a hematologist through the [email or web address] system and not through [facility]. He will follow-up with his pcp for a repeat cbc in 1 week and was advised to ask his PCP for a hematology referral  - folate     Dysphagia  Significant issues with swallowing and foods getting stuck in this patient with chronic alcohol use  SLP consulted and recommended EGD  Plan  - outpatient EGD being planned     1.6 hyperdense prostate nodule, could be prostate cancer  Plan  [facility]. As above, he wishes to follow-up with his [facility] providers and will discuss with his PCP. We spoke about this with his wife present at length       Steatohepatitis  Plan  - likely from alcohol use     Alcohol use  Drinks 3 scotch and water daily  States he does not become shaky when he stops drinking  Plan  - ciwa and oral vitamins  - hold on benzodiazepine until clear e/o withdrawal witnessed     Chronic medical conditions  Parkinson's disease: Resume medications once verified by pharmacy    Consultations This Hospital Stay   GASTROENTEROLOGY IP CONSULT  SPEECH LANGUAGE PATH ADULT IP CONSULT  PHYSICAL THERAPY ADULT IP CONSULT    Code Status   Full Code    Time Spent on this Encounter   I, Quincy Mcneal DO, personally saw the patient today and spent greater than 30 minutes discharging this patient.       Quincy Mcneal DO  [place] [address] [address]  [facility] [address] [place] [address]  Phone: [phone]  ______________________________________________________________________    Physical Exam   Vital Signs: Temp: 98.4  F (36.9  C) Temp src: Oral BP: 117/65 Pulse: 56   Resp: 16 SpO2: 94 % O2 Device: None (Room air)    Weight: 149 lbs 0  oz    General:        Alert and cooperative with exam. Patient in mild distress. Normal mentation.  ENT:              The external nose and ears are normal. The oropharynx is normal and without erythema; mucus membranes are moist. Uvula midline, no evidence of deep space infection.  Neck:            Normal range of motion without rigidity.  CV:                Regular rate and rhythm                        No pathologic murmur   Resp:            Breath sounds are clear bilaterally                        Non-labored, no retractions or accessory muscle use  GI:                 Abdomen is soft, no distension, no tenderness. No peritoneal signs  MS:                No lower extremity edema   Skin:             Warm and dry, No rash or lesions noted. Pale appearance.   Neuro:          Oriented x 3. No gross motor deficits.       Primary Care Physician   DELANEY FAM    Discharge Orders      Reason for your hospital stay    Anemia, unclear cause     Follow-up and recommended labs and tests     Follow up with primary care provider, DELANEY FAM, within 7 days to evaluate medication change and for hospital follow- up.  The following labs/tests are recommended: repeat cbc. Please ask your primary doctor for a hematology referral through the [facility] system      Follow-up with [facility] for outpatient EGD and colonoscopy as outlined. Call their office if you dont hear from them by Wednesday     Activity    Your activity upon discharge: activity as tolerated     Diet    Follow this diet upon discharge: Orders Placed This Encounter      Advance Diet as Tolerated: Regular Diet Adult       Significant Results and Procedures   Most Recent 3 CBC's:  Recent Labs   Lab Test 10/21/23  1112 10/20/23  1801 10/20/23  0943   WBC  --  6.4 4.8   HGB 9.0* 9.3* 5.9*   MCV  --  85 86   PLT  --  346 383     Most Recent 3 BMP's:  Recent Labs   Lab Test 10/21/23  1112 10/20/23  0943    136   POTASSIUM 3.6 3.7   CHLORIDE 103 100   CO2 22  20*   BUN 8.9 9.4   CR 0.63* 0.55*   ANIONGAP 13 16*   JORGE 8.0* 8.4*   * 105*     Most Recent 2 LFT's:  Recent Labs   Lab Test 10/21/23  1112 10/20/23  0943   AST 23 25   ALT <5 <5   ALKPHOS 107 105   BILITOTAL 1.1 0.4   ,   Results for orders placed or performed during the hospital encounter of 10/20/23   CT Abdomen Pelvis w Contrast    Narrative    CT ABDOMEN AND PELVIS WITH CONTRAST 10/20/2023 11:08 AM    CLINICAL HISTORY: Significant drop in hemoglobin, evaluate for  intra-abdominal pathology.    TECHNIQUE: CT scan of the abdomen and pelvis was performed following  injection of IV contrast. Multiplanar reformats were obtained. Dose  reduction techniques were used.  CONTRAST: 75mL Isovue-370    COMPARISON: None available    FINDINGS:   LOWER CHEST: Trace bilateral pleural effusions. Mild subpleural  bibasilar reticulation suggestive of mild fibrosis.    HEPATOBILIARY: Diffuse hepatic steatosis with slightly nodular  contour. Indeterminate hypoechoic observations in segment 4b (2 cm;  4/60), segment 3 (1 cm; 4/60) and segment 5 (1 cm; 4/54). Noncalcified  gallstone. Borderline gallbladder wall thickening.    PANCREAS: No significant mass, duct dilatation, or inflammatory  change.    SPLEEN: Normal size.    ADRENAL GLANDS: No significant nodules.    KIDNEYS/BLADDER: Tiny right renal cyst. No collecting system  dilatation. Urinary bladder is unremarkable.    BOWEL: Colonic diverticulosis without evidence of diverticulitis.  Apparent thickening of the ascending colon is most likely related to  underdistention. No bowel obstruction.    PELVIC ORGANS: Focal hyperdensity within the right prostate peripheral  zone (1.6 cm; 4/196).    ADDITIONAL FINDINGS: No ascites or fluid collection. No enlarged lymph  node. Severe atherosclerosis. Portal veins are patent.    MUSCULOSKELETAL: No aggressive osseous lesion. Subacute right  posterior 10-12th rib fractures, and right L1 transverse process  fracture with evidence of  healing.      Impression    IMPRESSION:   1.  No convincing acute process within the abdomen or pelvis to  explain the patient's bleeding.  2.  Apparent thickening of the ascending colon is likely related to  underdistention. Correlation with colonoscopy could be considered as  clinically indicated.  3.  Right peripheral zone prostate hyperdense nodular focus (1.6 cm)  could reflect a clinically significant prostate cancer. Recommend  correlation with prostate-specific antigen and/or MRI.  4.  Diffuse hepatic steatosis and morphologic changes suggestive of  underlying chronic hepatocellular disease. Indeterminate bilobar  hypoechoic applications (up to 2 cm). Correlate with outside imaging  or consider further characterization with MRI.  5.  Subacute right posterior 10-12th rib and right L1 transverse  process fractures with evidence of healing.  6.  Borderline gallbladder wall thickening is favored reactive.  Correlate with symptoms.    EH PERDOMO MD         SYSTEM ID:  [id number]       Discharge Medications   Current Discharge Medication List        START taking these medications    Details   cyanocobalamin (VITAMIN B-12) 1000 MCG tablet Take 1 tablet (1,000 mcg) by mouth daily  Qty: 30 tablet, Refills: 0    Associated Diagnoses: Symptomatic anemia           CONTINUE these medications which have NOT CHANGED    Details   atenolol (TENORMIN) 25 MG tablet Take 1 tablet by mouth daily      !! carbidopa-levodopa (SINEMET)  MG tablet Take 1 tablet by mouth 2 times daily      !! carbidopa-levodopa (SINEMET)  MG tablet Take 1 tablet by mouth daily as needed (in the afternoon if needed)      cholecalciferol, vitamin D3, (VITAMIN D3) 2,000 unit cap Take 2,000 Units by mouth daily      doxepin (SINEQUAN) 10 MG capsule Take 10 mg by mouth at bedtime      folic acid (FOLVITE) 1 MG tablet Take 1 mg by mouth daily      hydrOXYzine (ATARAX) 50 MG tablet Take 50 mg by mouth at bedtime      ibuprofen  (ADVIL/MOTRIN) 200 MG tablet Take 200 mg by mouth every 8 hours as needed for pain      mirtazapine (REMERON) 45 MG tablet [MIRTAZAPINE (REMERON) 45 MG TABLET] TAKE ONE TABLET BY MOUTH EVERY NIGHT AT BEDTIME  Qty: 30 tablet, Refills: 5    Associated Diagnoses: Insomnia      mupirocin (BACTROBAN) 2 % external ointment Apply topically daily Apply to wound until resolved      omeprazole (PRILOSEC) 20 MG DR capsule Take 20 mg by mouth daily      potassium gluconate 550 mg (90 mg) tablet Take 90 mg by mouth daily      rosuvastatin (CRESTOR) 5 MG tablet Take 5 mg by mouth every morning      traZODone (DESYREL) 100 MG tablet [TRAZODONE (DESYREL) 100 MG TABLET] TAKE 2 TABLETS BY MOUTH EVERY NIGHT AT BEDTIME  Qty: 180 tablet, Refills: 1    Associated Diagnoses: Insomnia, unspecified      lisinopril (ZESTRIL) 20 MG tablet Take 1 tablet by mouth daily       !! - Potential duplicate medications found. Please discuss with provider.        Allergies   Allergies   Allergen Reactions    Levofloxacin Swelling    Penicillins Hives

## 2023-10-21 NOTE — PROGRESS NOTES
10/21/23 1126   Appointment Info   Signing Clinician's Name / Credentials (PT) Hank Weaver DPT   Living Environment   People in Home spouse   Current Living Arrangements apartment   Home Accessibility no concerns   Transportation Anticipated family or friend will provide   Living Environment Comments Reports living in an apartment with his spouse.   Self-Care   Usual Activity Tolerance moderate   Current Activity Tolerance fair   Equipment Currently Used at Home walker, rolling  (Electric scooter)   Fall history within last six months yes   Number of times patient has fallen within last six months 6   Activity/Exercise/Self-Care Comment Reports that he usually usess a mixture of 4WW vs standing walker vs motorized scooter. States inside the apartment usually does not use an AD. States all of his falls over the last month d/t weakness.   General Information   Onset of Illness/Injury or Date of Surgery 10/20/23   Referring Physician Quincy Mcneal,    Patient/Family Therapy Goals Statement (PT) Return to home   Pertinent History of Current Problem (include personal factors and/or comorbidities that impact the POC) keith Samano is a 72 year old male admitted on 10/20/2023 presenting with acute on chronic normocytic symptomatic anemia who presents with 1-1/2 months of weakness and progressive dyspnea on exertion.   Existing Precautions/Restrictions fall   Cognition   Affect/Mental Status (Cognition) WFL   Orientation Status (Cognition) oriented x 4   Follows Commands (Cognition) WFL   Pain Assessment   Patient Currently in Pain No   Integumentary/Edema   Integumentary/Edema no deficits were identifed   Range of Motion (ROM)   ROM Comment WFLs for mobility and transfers no formal testing completed   Strength (Manual Muscle Testing)   Strength Comments WFLs for mobility and transfers no formal testing completed   Bed Mobility   Comment, (Bed Mobility) Supine to sitting EOB w/ SBA HOB elevated   Transfers    Comment, (Transfers) Sit to stand w/ 4WW and SBA   Gait/Stairs (Locomotion)   Comment, (Gait/Stairs) 10 ft w/ 4WW and SBA   Balance   Balance Comments No overt LOB noted   Clinical Impression   Criteria for Skilled Therapeutic Intervention Yes, treatment indicated   PT Diagnosis (PT) Impaired ambulation   Influenced by the following impairments Impaired strength, balance and activity tolerance   Functional limitations due to impairments Impaired ADLs, IADLs and functional mobility   Clinical Presentation (PT Evaluation Complexity) stable   Clinical Presentation Rationale Clinical judgment   Clinical Decision Making (Complexity) low complexity   Planned Therapy Interventions (PT) balance training;bed mobility training;gait training;patient/family education;strengthening;transfer training;progressive activity/exercise   Risk & Benefits of therapy have been explained evaluation/treatment results reviewed;care plan/treatment goals reviewed;risks/benefits reviewed;current/potential barriers reviewed;participants voiced agreement with care plan;participants included;patient   PT Total Evaluation Time   PT Eval, Low Complexity Minutes (09685) 10   Physical Therapy Goals   PT Frequency Daily   PT Predicted Duration/Target Date for Goal Attainment 10/31/23   PT Goals Bed Mobility;Transfers;Gait   PT: Bed Mobility Independent;Supine to/from sit   PT: Transfers Modified independent;Sit to/from stand;Assistive device   PT: Gait Modified independent;150 feet;Rolling walker   Interventions   Interventions Quick Adds Gait Training;Therapeutic Activity   Gait Training   Gait Training Minutes (82940) 23   Symptoms Noted During/After Treatment (Gait Training) none   Treatment Detail/Skilled Intervention Pt supine in bed at start of session. Agreeable to PT. Reports feeling better after receiving blood yesterday. Once seated EOB reports some lightheadedness. Seated BP taken at 129/60 w/ HR of 61. Standing BP taken at 126/63 w/ HR of  68. Pt ambulating ~150 ft x1 and 320 ft x1 w/ 4WW and SBA. No overt LOB noted. Denied lightheadedness w/ mobility. Reports some fatigue in LEs as Pt not doing much ambulation recently. Educated on use of 4WW while in his apartment and with all mobility. Pt agreeable. Educated on safe walker manegment w/ turns and stand to sit w/ locking of breaks. Sit to supine w/ SBA all needs met. Call light in place and bed alarm on.   PT Discharge Planning   PT Plan Gait w/o 4WW, activity tolerance, strengthening, formal balance assesment, likely only another visit or two   PT Discharge Recommendation (DC Rec) home with assist;home with home care physical therapy   PT Rationale for DC Rec Pt below baseline mobility. Typically uses a 4WW for mobility vs motorized scooter. Has been having frequent falls at home, however, feeling much better after receiving blood. Currently SBA w/ 4WW. Anticipate when medically ready Pt can DC home w/ assist from wife as needed and HHPT to improve upon functional mobility and activity tolerance.   PT Brief overview of current status SBA w/ 4WW   Total Session Time   Timed Code Treatment Minutes 23   Total Session Time (sum of timed and untimed services) 33

## 2023-10-21 NOTE — CONSULTS
GASTROENTEROLOGY CONSULTATION      Lamine Samano  [address] [facility] [address]  72 year old male     Admission Date/Time: 10/20/2023  Primary Care Provider: Rangel Kaur     We were asked to see the patient in consultation by Dr. Mcneal for evaluation of anemia.    IRP:      #1 Subacute anemia, symptomatic, without overt bleeding  #2 Dysphagia    Feeling now better after transfusion. Hb trend stable. Again, no overt bleeding. CT -ve otherwise. Iron labs normal, MCV normal- suggestive against iron deficiency or low grade GI bleed.   With dysphagia, certainly eso mass possible. Nothing obvious on CT.   Discussed inpatient EGD/Colon vs outpatient. If these are negative, then will need pillcam. Pt agreeable to arrange these outpatient. If these are negative, then hematology evaluation will be needed.   D/w with Neetu as well. Advance diet. If tolerating, discharge today, [facility] will contact pt for EGD/Colon appt.        Matilda Sung MD   [facility]  [phone]      ________________________________________________________________________        HPI:  Lamine Samano is a 72 year old male who presented with progressive weakness over last 1.5 months. Denies any melena, dark stools or blood in stools. Reports some dysphagia, point to neck, with dry items primarily. Some alcohol use. No known CLD. Also prostate nodule- incidental, otherwise no concerns on CT. Initial Hb was 5.9, came up to 9.3 with 2 units PRBC and stable at that level this AM. Pt reports good appetite and otherwise denies NSAID use, anticoagulation, other chronic issues. Lives close in [place].      ROS: A comprehensive ten point review of systems was negative aside from those in mentioned in the HPI.      PAST MEDICAL HISTORY:  Past Medical History:   Diagnosis Date    Parkinson disease      SOCIAL HISTORY:  Social History     Tobacco Use    Smoking status: Former     FAMILY HISTORY:  Family History   Problem Relation Age of  Onset    Diabetes Type 2  Brother     Diabetes Brother      ALLERGIES:   Allergies   Allergen Reactions    Levofloxacin Swelling    Penicillins Hives     MEDICATIONS:   Prior to Admission medications    Medication Sig Start Date End Date Taking? Authorizing Provider   atenolol (TENORMIN) 25 MG tablet Take 1 tablet by mouth daily 4/10/22  Yes Unknown, Entered By History   carbidopa-levodopa (SINEMET)  MG tablet Take 1 tablet by mouth 2 times daily 11/22/21  Yes Unknown, Entered By History   carbidopa-levodopa (SINEMET)  MG tablet Take 1 tablet by mouth daily as needed (in the afternoon if needed)   Yes Unknown, Entered By History   cholecalciferol, vitamin D3, (VITAMIN D3) 2,000 unit cap Take 2,000 Units by mouth daily 3/3/15  Yes Provider, Historical   doxepin (SINEQUAN) 10 MG capsule Take 10 mg by mouth at bedtime 8/15/23  Yes Unknown, Entered By History   folic acid (FOLVITE) 1 MG tablet Take 1 mg by mouth daily 6/9/22  Yes Unknown, Entered By History   hydrOXYzine (ATARAX) 50 MG tablet Take 50 mg by mouth at bedtime 8/28/23  Yes Unknown, Entered By History   ibuprofen (ADVIL/MOTRIN) 200 MG tablet Take 200 mg by mouth every 8 hours as needed for pain   Yes Unknown, Entered By History   mirtazapine (REMERON) 45 MG tablet [MIRTAZAPINE (REMERON) 45 MG TABLET] TAKE ONE TABLET BY MOUTH EVERY NIGHT AT BEDTIME 6/28/17  Yes Gregg Crowe   mupirocin (BACTROBAN) 2 % external ointment Apply topically daily Apply to wound until resolved 9/6/23  Yes Unknown, Entered By History   omeprazole (PRILOSEC) 20 MG DR capsule Take 20 mg by mouth daily 2/19/23  Yes Unknown, Entered By History   potassium gluconate 550 mg (90 mg) tablet Take 90 mg by mouth daily 3/6/15  Yes Provider, Historical   rosuvastatin (CRESTOR) 5 MG tablet Take 5 mg by mouth every morning 2/19/23  Yes Unknown, Entered By History   traZODone (DESYREL) 100 MG tablet [TRAZODONE (DESYREL) 100 MG TABLET] TAKE 2 TABLETS BY MOUTH EVERY NIGHT AT BEDTIME  "7/8/17  Yes Gregg Crowe   lisinopril (ZESTRIL) 20 MG tablet Take 1 tablet by mouth daily 4/10/22   Unknown, Entered By History     PHYSICAL EXAM:   /65 (BP Location: Right arm)   Pulse 56   Temp 98.4  F (36.9  C) (Oral)   Resp 16   Ht 1.727 m (5' 8\")   Wt 67.6 kg (149 lb)   SpO2 94%   BMI 22.66 kg/m     GEN: No distress, Alert, comfortable.  JANNA: No pallor, No icterus, oral mucosa moist.    NECK: No thyromegaly. No mass.    HEART: RRR, S1 S2 normal.   LUNGS: CTA BL  ABD: soft, non-tender, non-distended, no peritoneal signs.  NEURO: No gross motor deficits.  PSYCH: A O X 3.  EXTREMITIES: No pedal edema.      ADDITIONAL DATA:   I reviewed the patient's new clinical lab test results.   Recent Labs   Lab Test 10/21/23  1112 10/20/23  1801 10/20/23  0943   WBC  --  6.4 4.8   HGB 9.0* 9.3* 5.9*   MCV  --  85 86   PLT  --  346 383     Recent Labs   Lab Test 10/21/23  1112 10/20/23  0943    136   POTASSIUM 3.6 3.7   CHLORIDE 103 100   CO2 22 20*   BUN 8.9 9.4   CR 0.63* 0.55*   ANIONGAP 13 16*   JORGE 8.0* 8.4*   * 105*     Recent Labs   Lab Test 10/21/23  1112 10/20/23  0943   ALBUMIN 3.2* 3.3*   BILITOTAL 1.1 0.4   ALT <5 <5   AST 23 25        I reviewed the patient's new imaging results.    Total time: 50 minutes, including discussion with family and discussion with Dr. Mcneal.     "

## 2023-10-21 NOTE — PLAN OF CARE
Goal Outcome Evaluation:       Summary: Dizziness, weakness, [place] (Hgb 5.9 on admission), WEISS  DATE & TIME:10/20/23-10/21/23 [id number]  Cognitive Concerns/ Orientation : A&Ox4, calm and cooperative.    BEHAVIOR & AGGRESSION TOOL COLOR: Green   CIWA SCORE: 0,0  ABNL VS/O2: VSS, on RA   MOBILITY: SBA, with walker. (Uses walker and scooter at home)  PAIN MANAGMENT: Denies   DIET: NPO at midnight for possible EGD/Colonoscopy. Speech consulted.   BOWEL/BLADDER: Continent. Needs stool sample for occult blood.   ABNL LAB/BG: Hgb 9.3, Crea 0.55, Anion 16, Protein total 5.9, Iron 220, ferritin 514  DRAIN/DEVICES: PIV LFA infusing NS at 100mL/hr.   TELEMETRY RHYTHM: NA   SKIN: Pale, scattered scabs and bruises on extremities, dry/peeling/cracked feet  TESTS/PROCEDURES: CT Abd done in ED  D/C DAY/GOALS/PLACE: Pending work up. GI consulted. Speech to see about reported swallowing issues.   OTHER IMPORTANT INFO: no bleeding noted, denies lightheadedness, SOB,

## 2023-10-21 NOTE — PROGRESS NOTES
Bedside Swallow Evaluation      10/21/23 1300   Appointment Info   Signing Clinician's Name / Credentials (SLP) Jennifer Mcgee MA, CCC-SLP   General Information   Onset of Illness/Injury or Date of Surgery 10/20/23   Referring Physician Quincy Mcneal, DO   Patient/Family Therapy Goal Statement (SLP) Did not state   Pertinent History of Current Problem Per MD note: Lamine Samano is a 72 year old male with past medical history of Parkinson's disease, chronic alcohol use, and hypertension who presents with 1-1/2 months of worsening dizziness lightheadedness and dyspnea on exertion.  The patient notes during this time he has had difficulty swallowing solid foods with him being stuck.  Thus he notes poor oral intake and has lost about 15 to 20 pounds.  Because of his lightheadedness he has had multiple falls but has not had any episodes of loss of consciousness.  He notes no significant trauma from these falls.   Type of Evaluation   Type of Evaluation Swallow Evaluation   Oral Motor   Oral Musculature generally intact   Dentition (Oral Motor)   Dentition (Oral Motor) adequate dentition;some missing teeth   Facial Symmetry (Oral Motor)   Facial Symmetry (Oral Motor) WNL   Lip Function (Oral Motor)   Lip Range of Motion (Oral Motor) WNL   Tongue Function (Oral Motor)   Tongue ROM (Oral Motor) WNL   Jaw Function (Oral Motor)   Jaw Function (Oral Motor) WNL   Cough/Swallow/Gag Reflex (Oral Motor)   Soft Palate/Velum (Oral Motor) WNL   Volitional Throat Clear/Cough (Oral Motor) WNL   Volitional Swallow (Oral Motor) WNL   Vocal Quality/Secretion Management (Oral Motor)   Vocal Quality (Oral Motor) WFL   Secretion Management (Oral Motor) WNL   General Swallowing Observations   Past History of Dysphagia No documented speech therapy services. Per pt and family, about a month of food feeling like it is getting stuck in his throat, regurgitation, and burping. It is worse with bready foods and steak. He says that he can  eat foods like soft pasta, tacos, veggies. When he drinks he does not cough, but is burpy.   Respiratory Support room air   Current Diet/Method of Nutritional Intake (General Swallowing Observations, NIS) regular diet;thin liquids (level 0)   Swallowing Evaluation Clinical swallow evaluation   Clinical Swallow Evaluation   Feeding Assistance no assistance needed   Additional evaluation(s) completed today No   Clinical Swallow Evaluation Textures Trialed thin liquids;solid foods   Clinical Swallow Eval: Thin Liquid Texture Trial   Mode of Presentation, Thin Liquids cup;self-fed   Volume of Liquid or Food Presented >6 oz   Oral Phase of Swallow WFL   Pharyngeal Phase of Swallow intact   Diagnostic Statement No overt s/sx of aspiration, burping following and some wet burps that pt reports it is coming back up   Clinical Swallow Evaluation: Solid Food Texture Trial   Mode of Presentation self-fed   Volume Presented crackers x4, broccoli, omelete, potatoes   Oral Phase WFL   Pharyngeal Phase intact   Diagnostic Statement No s/sx of aspiration, adequate mastication, eventual feeling of reflux per pt but not that all his food was stuck   Esophageal Phase of Swallow   Patient reports or presents with symptoms of esophageal dysphagia Yes   Esophageal comments On omeprozole, GI consult as OP given reports of compacted and slow digesting food   Swallowing Recommendations   Diet Consistency Recommendations regular diet;thin liquids (level 0)   Supervision Level for Intake patient independent   Mode of Delivery Recommendations bolus size, small;no straws;slow rate of intake;food moistened   Postural Recommendations none   Swallowing Maneuver Recommendations alternate food and liquid intake;effortful (hard) swallow   Recommended Feeding/Eating Techniques (Swallow Eval) maintain upright sitting position for eating;maintain upright posture during/after eating for 30 minutes   Medication Administration Recommendations, Swallowing  (SLP) Whole, large pills in purees, considering crushing large pills pending GI work up   Instrumental Assessment Recommendations instrumental evaluation not recommended at this time   Comment, Swallowing Recommendations Pt consumed thin liquids and solid foods. No overt s/sx of aspiration during PO intake. Eventual reflux, burping, but reports the food is going down   General Therapy Interventions   Planned Therapy Interventions Dysphagia Treatment   Dysphagia treatment Instruction of safe swallow strategies;Compensatory strategies for swallowing   Clinical Impression   Criteria for Skilled Therapeutic Interventions Met (SLP Eval) Evaluation only   SLP Diagnosis Functional swallow with esophogeal dysphagia   Risks & Benefits of therapy have been explained evaluation/treatment results reviewed;care plan/treatment goals reviewed;risks/benefits reviewed;current/potential barriers reviewed;participants voiced agreement with care plan;participants included;patient;spouse/significant other;daughter   Clinical Impression Comments Patient presents with functional oral and pharyngeal phase swallow with concern for esophageal dysphagia given reports of food feeling like it is stuck, difficuty with large pills, and burping/retching. Extensive conversation with pt and family regarding reflux precautions, GI follow-up, and potential VFSS/OP SLP pending GI work-up. Family demonstrated understanding.     Recommend regular diet with thin liquids. Strict swallow precautions: reflux precautions (sitting upright during and 60min following meals, smaller meals, slow rate, mositen foods, avoid bread foods+dry meats), alternate consistencies, and small bites/sips. All questions answered. Pending GI work-up as an outpatient, may benefit from video swallow study vs OP speech therapy. Pt does has Parkinson's which discussed potential speech therapy down the road.      SLP Total Evaluation Time   Eval: oral/pharyngeal swallow function,  clinical swallow Minutes (11297) 20   SLP Goals   Therapy Frequency (SLP Eval) one time eval and treatment only   SLP Predicted Duration/Target Date for Goal Attainment 10/21/23   SLP Goals Swallow   SLP: Safely tolerate diet without signs/symptoms of aspiration Regular diet;Thin liquids;With use of swallow precautions;With use of compensatory swallow strategies;Independently   Interventions   Interventions Quick Adds Swallowing Dysfunction   Swallowing Dysfunction &/or Oral Function for Feeding   Treatment of Swallowing Dysfunction &/or Oral Function for Feeding Minutes (60911) 10   Symptoms Noted During/After Treatment None   Treatment Detail/Skilled Intervention Extensive education on reflux precautions, swallowing safety, and outpatient plan.   SLP Discharge Planning   SLP Plan no further IP SLP needs   SLP Discharge Recommendation home;home with outpatient therapy services   SLP Rationale for DC Rec Pending GI work-up, pt may benefit for VFSS vs OP speech therapy   SLP Brief overview of current status  Clinical swallow evaluation completed due to reported concerns of difficulty swallowing which appears esophageal related. Recommend regular diet with thin liquids. Strict swallow precautions: reflux precautions (sitting upright during and 60min following meals, small bites/sips, slow rate, mositen foods, avoid bread foods+dry meats). All questions answered   Total Session Time   Total Session Time (sum of timed and untimed services) 30

## 2023-10-22 NOTE — PLAN OF CARE
Physical Therapy Discharge Summary    Reason for therapy discharge:    Discharged to home with home therapy.    Progress towards therapy goal(s). See goals on Care Plan in [facility] electronic health record for goal details.  Goals partially met.  Barriers to achieving goals:   discharge from facility.    Therapy recommendation(s):    Continued therapy is recommended.  Rationale/Recommendations:  Pt below baseline mobility. Typically uses a 4WW for mobility vs motorized scooter. Has been having frequent falls at home, however, feeling much better after receiving blood. Currently SBA w/ 4WW. Anticipate when medically ready Pt can DC home w/ assist from wife as needed and HHPT to improve upon functional mobility and activity tolerance.  PT Brief overview of current status: SBA w/ 4WW      Recommendation above provided by last treating therapist.

## 2023-10-23 LAB
PATH REPORT.COMMENTS IMP SPEC: NORMAL
PATH REPORT.COMMENTS IMP SPEC: NORMAL
PATH REPORT.FINAL DX SPEC: NORMAL
PATH REPORT.MICROSCOPIC SPEC OTHER STN: NORMAL
PATH REPORT.MICROSCOPIC SPEC OTHER STN: NORMAL
PATH REPORT.RELEVANT HX SPEC: NORMAL

## 2023-10-23 PROCEDURE — 85060 BLOOD SMEAR INTERPRETATION: CPT | Performed by: PATHOLOGY

## 2024-10-24 ENCOUNTER — APPOINTMENT (OUTPATIENT)
Dept: CT IMAGING | Facility: CLINIC | Age: 74
DRG: 391 | End: 2024-10-24
Attending: EMERGENCY MEDICINE
Payer: COMMERCIAL

## 2024-10-24 ENCOUNTER — HOSPITAL ENCOUNTER (INPATIENT)
Facility: CLINIC | Age: 74
LOS: 8 days | Discharge: HOME | DRG: 391 | End: 2024-11-01
Attending: EMERGENCY MEDICINE | Admitting: INTERNAL MEDICINE
Payer: COMMERCIAL

## 2024-10-24 ENCOUNTER — APPOINTMENT (OUTPATIENT)
Dept: MRI IMAGING | Facility: CLINIC | Age: 74
DRG: 391 | End: 2024-10-24
Attending: INTERNAL MEDICINE
Payer: COMMERCIAL

## 2024-10-24 DIAGNOSIS — R10.84 GENERALIZED ABDOMINAL PAIN: ICD-10-CM

## 2024-10-24 DIAGNOSIS — K65.0 PERIHEPATIC ABSCESS (H): Primary | ICD-10-CM

## 2024-10-24 DIAGNOSIS — C22.0 HEPATOCELLULAR CARCINOMA (H): ICD-10-CM

## 2024-10-24 LAB
ALBUMIN SERPL BCG-MCNC: 3 G/DL (ref 3.5–5.2)
ALP SERPL-CCNC: 168 U/L (ref 40–150)
ALT SERPL W P-5'-P-CCNC: 16 U/L (ref 0–70)
ANION GAP SERPL CALCULATED.3IONS-SCNC: 16 MMOL/L (ref 7–15)
APTT PPP: 36 SECONDS (ref 22–38)
AST SERPL W P-5'-P-CCNC: 51 U/L (ref 0–45)
BASOPHILS # BLD AUTO: 0.1 10E3/UL (ref 0–0.2)
BASOPHILS NFR BLD AUTO: 1 %
BILIRUB SERPL-MCNC: 1.3 MG/DL
BUN SERPL-MCNC: 15.3 MG/DL (ref 8–23)
CALCIUM SERPL-MCNC: 8.4 MG/DL (ref 8.8–10.4)
CHLORIDE SERPL-SCNC: 92 MMOL/L (ref 98–107)
CREAT SERPL-MCNC: 0.85 MG/DL (ref 0.67–1.17)
EGFRCR SERPLBLD CKD-EPI 2021: >90 ML/MIN/1.73M2
EOSINOPHIL # BLD AUTO: 0 10E3/UL (ref 0–0.7)
EOSINOPHIL NFR BLD AUTO: 0 %
ERYTHROCYTE [DISTWIDTH] IN BLOOD BY AUTOMATED COUNT: 14.4 % (ref 10–15)
FIBRINOGEN PPP-MCNC: 651 MG/DL (ref 170–510)
GLUCOSE BLDC GLUCOMTR-MCNC: 110 MG/DL (ref 70–99)
GLUCOSE BLDC GLUCOMTR-MCNC: 144 MG/DL (ref 70–99)
GLUCOSE BLDC GLUCOMTR-MCNC: 160 MG/DL (ref 70–99)
GLUCOSE BLDC GLUCOMTR-MCNC: 202 MG/DL (ref 70–99)
GLUCOSE SERPL-MCNC: 125 MG/DL (ref 70–99)
HCO3 SERPL-SCNC: 23 MMOL/L (ref 22–29)
HCT VFR BLD AUTO: 29.7 % (ref 40–53)
HGB BLD-MCNC: 9.9 G/DL (ref 13.3–17.7)
IMM GRANULOCYTES # BLD: 0.2 10E3/UL
IMM GRANULOCYTES NFR BLD: 1 %
INR PPP: 1.25 (ref 0.85–1.15)
LIPASE SERPL-CCNC: 23 U/L (ref 13–60)
LYMPHOCYTES # BLD AUTO: 1.3 10E3/UL (ref 0.8–5.3)
LYMPHOCYTES NFR BLD AUTO: 8 %
MAGNESIUM SERPL-MCNC: 1.2 MG/DL (ref 1.7–2.3)
MCH RBC QN AUTO: 30.7 PG (ref 26.5–33)
MCHC RBC AUTO-ENTMCNC: 33.3 G/DL (ref 31.5–36.5)
MCV RBC AUTO: 92 FL (ref 78–100)
MONOCYTES # BLD AUTO: 1.2 10E3/UL (ref 0–1.3)
MONOCYTES NFR BLD AUTO: 7 %
NEUTROPHILS # BLD AUTO: 14.5 10E3/UL (ref 1.6–8.3)
NEUTROPHILS NFR BLD AUTO: 83 %
NRBC # BLD AUTO: 0 10E3/UL
NRBC BLD AUTO-RTO: 0 /100
PHOSPHATE SERPL-MCNC: 2.8 MG/DL (ref 2.5–4.5)
PLATELET # BLD AUTO: 367 10E3/UL (ref 150–450)
POTASSIUM SERPL-SCNC: 3.9 MMOL/L (ref 3.4–5.3)
PROT SERPL-MCNC: 7.2 G/DL (ref 6.4–8.3)
RBC # BLD AUTO: 3.23 10E6/UL (ref 4.4–5.9)
SODIUM SERPL-SCNC: 131 MMOL/L (ref 135–145)
WBC # BLD AUTO: 17.4 10E3/UL (ref 4–11)

## 2024-10-24 PROCEDURE — 85610 PROTHROMBIN TIME: CPT | Performed by: INTERNAL MEDICINE

## 2024-10-24 PROCEDURE — 99223 1ST HOSP IP/OBS HIGH 75: CPT | Performed by: INTERNAL MEDICINE

## 2024-10-24 PROCEDURE — 83735 ASSAY OF MAGNESIUM: CPT | Performed by: INTERNAL MEDICINE

## 2024-10-24 PROCEDURE — 250N000009 HC RX 250: Performed by: INTERNAL MEDICINE

## 2024-10-24 PROCEDURE — 36415 COLL VENOUS BLD VENIPUNCTURE: CPT | Performed by: EMERGENCY MEDICINE

## 2024-10-24 PROCEDURE — 84100 ASSAY OF PHOSPHORUS: CPT | Performed by: INTERNAL MEDICINE

## 2024-10-24 PROCEDURE — A9585 GADOBUTROL INJECTION: HCPCS | Performed by: INTERNAL MEDICINE

## 2024-10-24 PROCEDURE — 74183 MRI ABD W/O CNTR FLWD CNTR: CPT

## 2024-10-24 PROCEDURE — 99222 1ST HOSP IP/OBS MODERATE 55: CPT | Performed by: SPECIALIST

## 2024-10-24 PROCEDURE — 80053 COMPREHEN METABOLIC PANEL: CPT | Performed by: EMERGENCY MEDICINE

## 2024-10-24 PROCEDURE — 85384 FIBRINOGEN ACTIVITY: CPT | Performed by: INTERNAL MEDICINE

## 2024-10-24 PROCEDURE — 85025 COMPLETE CBC W/AUTO DIFF WBC: CPT | Performed by: EMERGENCY MEDICINE

## 2024-10-24 PROCEDURE — 250N000011 HC RX IP 250 OP 636: Performed by: EMERGENCY MEDICINE

## 2024-10-24 PROCEDURE — 36415 COLL VENOUS BLD VENIPUNCTURE: CPT | Performed by: INTERNAL MEDICINE

## 2024-10-24 PROCEDURE — 250N000013 HC RX MED GY IP 250 OP 250 PS 637: Performed by: INTERNAL MEDICINE

## 2024-10-24 PROCEDURE — 250N000011 HC RX IP 250 OP 636: Performed by: INTERNAL MEDICINE

## 2024-10-24 PROCEDURE — 250N000009 HC RX 250: Performed by: EMERGENCY MEDICINE

## 2024-10-24 PROCEDURE — 99285 EMERGENCY DEPT VISIT HI MDM: CPT | Mod: 25

## 2024-10-24 PROCEDURE — 250N000012 HC RX MED GY IP 250 OP 636 PS 637: Performed by: INTERNAL MEDICINE

## 2024-10-24 PROCEDURE — 82105 ALPHA-FETOPROTEIN SERUM: CPT | Performed by: INTERNAL MEDICINE

## 2024-10-24 PROCEDURE — 99221 1ST HOSP IP/OBS SF/LOW 40: CPT | Performed by: SURGERY

## 2024-10-24 PROCEDURE — 87040 BLOOD CULTURE FOR BACTERIA: CPT | Performed by: EMERGENCY MEDICINE

## 2024-10-24 PROCEDURE — 74177 CT ABD & PELVIS W/CONTRAST: CPT

## 2024-10-24 PROCEDURE — 258N000003 HC RX IP 258 OP 636: Performed by: INTERNAL MEDICINE

## 2024-10-24 PROCEDURE — 255N000002 HC RX 255 OP 636: Performed by: INTERNAL MEDICINE

## 2024-10-24 PROCEDURE — 250N000011 HC RX IP 250 OP 636: Performed by: HOSPITALIST

## 2024-10-24 PROCEDURE — 120N000001 HC R&B MED SURG/OB

## 2024-10-24 PROCEDURE — 83690 ASSAY OF LIPASE: CPT | Performed by: EMERGENCY MEDICINE

## 2024-10-24 PROCEDURE — 96374 THER/PROPH/DIAG INJ IV PUSH: CPT | Mod: 59

## 2024-10-24 PROCEDURE — 85730 THROMBOPLASTIN TIME PARTIAL: CPT | Performed by: INTERNAL MEDICINE

## 2024-10-24 RX ORDER — NALOXONE HYDROCHLORIDE 0.4 MG/ML
0.4 INJECTION, SOLUTION INTRAMUSCULAR; INTRAVENOUS; SUBCUTANEOUS
Status: DISCONTINUED | OUTPATIENT
Start: 2024-10-24 | End: 2024-11-01 | Stop reason: HOSPADM

## 2024-10-24 RX ORDER — IOPAMIDOL 755 MG/ML
80 INJECTION, SOLUTION INTRAVASCULAR ONCE
Status: COMPLETED | OUTPATIENT
Start: 2024-10-24 | End: 2024-10-24

## 2024-10-24 RX ORDER — METRONIDAZOLE 500 MG/100ML
500 INJECTION, SOLUTION INTRAVENOUS ONCE
Status: DISCONTINUED | OUTPATIENT
Start: 2024-10-24 | End: 2024-10-24

## 2024-10-24 RX ORDER — ONDANSETRON 4 MG/1
4 TABLET, ORALLY DISINTEGRATING ORAL EVERY 6 HOURS PRN
Status: DISCONTINUED | OUTPATIENT
Start: 2024-10-24 | End: 2024-11-01 | Stop reason: HOSPADM

## 2024-10-24 RX ORDER — NALOXONE HYDROCHLORIDE 0.4 MG/ML
0.2 INJECTION, SOLUTION INTRAMUSCULAR; INTRAVENOUS; SUBCUTANEOUS
Status: DISCONTINUED | OUTPATIENT
Start: 2024-10-24 | End: 2024-11-01 | Stop reason: HOSPADM

## 2024-10-24 RX ORDER — TRAZODONE HYDROCHLORIDE 100 MG/1
100 TABLET ORAL
Status: DISCONTINUED | OUTPATIENT
Start: 2024-10-24 | End: 2024-11-01 | Stop reason: HOSPADM

## 2024-10-24 RX ORDER — DOXEPIN HYDROCHLORIDE 10 MG/1
10 CAPSULE ORAL AT BEDTIME
Status: DISCONTINUED | OUTPATIENT
Start: 2024-10-24 | End: 2024-10-24

## 2024-10-24 RX ORDER — HYDROMORPHONE HCL IN WATER/PF 6 MG/30 ML
0.4 PATIENT CONTROLLED ANALGESIA SYRINGE INTRAVENOUS
Status: DISCONTINUED | OUTPATIENT
Start: 2024-10-24 | End: 2024-11-01 | Stop reason: HOSPADM

## 2024-10-24 RX ORDER — FOLIC ACID 1 MG/1
1 TABLET ORAL DAILY
Status: DISCONTINUED | OUTPATIENT
Start: 2024-10-25 | End: 2024-10-27

## 2024-10-24 RX ORDER — GADOBUTROL 604.72 MG/ML
7 INJECTION INTRAVENOUS ONCE
Status: COMPLETED | OUTPATIENT
Start: 2024-10-24 | End: 2024-10-24

## 2024-10-24 RX ORDER — HYDROXYZINE HYDROCHLORIDE 25 MG/1
50 TABLET, FILM COATED ORAL
Status: DISCONTINUED | OUTPATIENT
Start: 2024-10-24 | End: 2024-11-01 | Stop reason: HOSPADM

## 2024-10-24 RX ORDER — HYDRALAZINE HYDROCHLORIDE 20 MG/ML
10 INJECTION INTRAMUSCULAR; INTRAVENOUS EVERY 4 HOURS PRN
Status: DISCONTINUED | OUTPATIENT
Start: 2024-10-24 | End: 2024-11-01 | Stop reason: HOSPADM

## 2024-10-24 RX ORDER — HYDROXYZINE HYDROCHLORIDE 25 MG/1
50 TABLET, FILM COATED ORAL AT BEDTIME
Status: DISCONTINUED | OUTPATIENT
Start: 2024-10-24 | End: 2024-10-24

## 2024-10-24 RX ORDER — AMOXICILLIN 250 MG
1 CAPSULE ORAL 2 TIMES DAILY PRN
Status: DISCONTINUED | OUTPATIENT
Start: 2024-10-24 | End: 2024-11-01 | Stop reason: HOSPADM

## 2024-10-24 RX ORDER — ERTAPENEM 1 G/1
1 INJECTION, POWDER, LYOPHILIZED, FOR SOLUTION INTRAMUSCULAR; INTRAVENOUS ONCE
Status: DISCONTINUED | OUTPATIENT
Start: 2024-10-24 | End: 2024-10-24

## 2024-10-24 RX ORDER — HYDROMORPHONE HYDROCHLORIDE 1 MG/ML
0.5 INJECTION, SOLUTION INTRAMUSCULAR; INTRAVENOUS; SUBCUTANEOUS
Status: DISCONTINUED | OUTPATIENT
Start: 2024-10-24 | End: 2024-10-24

## 2024-10-24 RX ORDER — DOCUSATE SODIUM 100 MG/1
100 CAPSULE, LIQUID FILLED ORAL 2 TIMES DAILY
Status: DISCONTINUED | OUTPATIENT
Start: 2024-10-24 | End: 2024-11-01 | Stop reason: HOSPADM

## 2024-10-24 RX ORDER — METRONIDAZOLE 500 MG/100ML
500 INJECTION, SOLUTION INTRAVENOUS EVERY 8 HOURS
Status: DISCONTINUED | OUTPATIENT
Start: 2024-10-24 | End: 2024-10-24

## 2024-10-24 RX ORDER — CARBIDOPA/LEVODOPA 25MG-250MG
1 TABLET ORAL 2 TIMES DAILY
Status: DISCONTINUED | OUTPATIENT
Start: 2024-10-24 | End: 2024-11-01 | Stop reason: HOSPADM

## 2024-10-24 RX ORDER — FUROSEMIDE 20 MG/1
20 TABLET ORAL DAILY PRN
COMMUNITY

## 2024-10-24 RX ORDER — METRONIDAZOLE 500 MG/100ML
500 INJECTION, SOLUTION INTRAVENOUS EVERY 12 HOURS
Status: DISCONTINUED | OUTPATIENT
Start: 2024-10-24 | End: 2024-10-25

## 2024-10-24 RX ORDER — CALCIUM CARBONATE 500 MG/1
1000 TABLET, CHEWABLE ORAL 4 TIMES DAILY PRN
Status: DISCONTINUED | OUTPATIENT
Start: 2024-10-24 | End: 2024-11-01 | Stop reason: HOSPADM

## 2024-10-24 RX ORDER — MULTIPLE VITAMINS W/ MINERALS TAB 9MG-400MCG
1 TAB ORAL DAILY
Status: DISCONTINUED | OUTPATIENT
Start: 2024-10-25 | End: 2024-11-01 | Stop reason: HOSPADM

## 2024-10-24 RX ORDER — CARBIDOPA/LEVODOPA 25MG-250MG
1 TABLET ORAL DAILY PRN
Status: DISCONTINUED | OUTPATIENT
Start: 2024-10-24 | End: 2024-11-01 | Stop reason: HOSPADM

## 2024-10-24 RX ORDER — DEXTROSE MONOHYDRATE 25 G/50ML
25-50 INJECTION, SOLUTION INTRAVENOUS
Status: DISCONTINUED | OUTPATIENT
Start: 2024-10-24 | End: 2024-10-28

## 2024-10-24 RX ORDER — FOLIC ACID 1 MG/1
1 TABLET ORAL DAILY
Status: DISCONTINUED | OUTPATIENT
Start: 2024-10-24 | End: 2024-10-24

## 2024-10-24 RX ORDER — CEFEPIME HYDROCHLORIDE 2 G/1
2 INJECTION, POWDER, FOR SOLUTION INTRAVENOUS ONCE
Status: COMPLETED | OUTPATIENT
Start: 2024-10-24 | End: 2024-10-24

## 2024-10-24 RX ORDER — FLUMAZENIL 0.1 MG/ML
0.2 INJECTION, SOLUTION INTRAVENOUS
Status: DISCONTINUED | OUTPATIENT
Start: 2024-10-24 | End: 2024-11-01 | Stop reason: HOSPADM

## 2024-10-24 RX ORDER — DOXEPIN HYDROCHLORIDE 10 MG/1
10 CAPSULE ORAL
Status: DISCONTINUED | OUTPATIENT
Start: 2024-10-24 | End: 2024-11-01 | Stop reason: HOSPADM

## 2024-10-24 RX ORDER — MIRTAZAPINE 15 MG/1
45 TABLET, FILM COATED ORAL AT BEDTIME
Status: DISCONTINUED | OUTPATIENT
Start: 2024-10-24 | End: 2024-10-24

## 2024-10-24 RX ORDER — HYDROMORPHONE HYDROCHLORIDE 2 MG/1
2 TABLET ORAL EVERY 4 HOURS PRN
Status: DISCONTINUED | OUTPATIENT
Start: 2024-10-24 | End: 2024-11-01 | Stop reason: HOSPADM

## 2024-10-24 RX ORDER — NICOTINE POLACRILEX 4 MG
15-30 LOZENGE BUCCAL
Status: DISCONTINUED | OUTPATIENT
Start: 2024-10-24 | End: 2024-10-28

## 2024-10-24 RX ORDER — AMOXICILLIN 250 MG
2 CAPSULE ORAL 2 TIMES DAILY PRN
Status: DISCONTINUED | OUTPATIENT
Start: 2024-10-24 | End: 2024-11-01 | Stop reason: HOSPADM

## 2024-10-24 RX ORDER — LIDOCAINE 40 MG/G
CREAM TOPICAL
Status: DISCONTINUED | OUTPATIENT
Start: 2024-10-24 | End: 2024-11-01 | Stop reason: HOSPADM

## 2024-10-24 RX ORDER — HYDROMORPHONE HCL IN WATER/PF 6 MG/30 ML
0.2 PATIENT CONTROLLED ANALGESIA SYRINGE INTRAVENOUS
Status: DISCONTINUED | OUTPATIENT
Start: 2024-10-24 | End: 2024-11-01 | Stop reason: HOSPADM

## 2024-10-24 RX ORDER — TRAZODONE HYDROCHLORIDE 100 MG/1
100 TABLET ORAL AT BEDTIME
Status: DISCONTINUED | OUTPATIENT
Start: 2024-10-24 | End: 2024-10-24

## 2024-10-24 RX ORDER — MAGNESIUM SULFATE HEPTAHYDRATE 40 MG/ML
4 INJECTION, SOLUTION INTRAVENOUS ONCE
Status: COMPLETED | OUTPATIENT
Start: 2024-10-24 | End: 2024-10-25

## 2024-10-24 RX ORDER — ONDANSETRON 2 MG/ML
4 INJECTION INTRAMUSCULAR; INTRAVENOUS EVERY 6 HOURS PRN
Status: DISCONTINUED | OUTPATIENT
Start: 2024-10-24 | End: 2024-11-01 | Stop reason: HOSPADM

## 2024-10-24 RX ORDER — CEFEPIME HYDROCHLORIDE 2 G/1
2 INJECTION, POWDER, FOR SOLUTION INTRAVENOUS EVERY 12 HOURS
Status: DISCONTINUED | OUTPATIENT
Start: 2024-10-24 | End: 2024-10-24

## 2024-10-24 RX ORDER — POLYETHYLENE GLYCOL 3350 17 G/17G
17 POWDER, FOR SOLUTION ORAL 2 TIMES DAILY PRN
Status: DISCONTINUED | OUTPATIENT
Start: 2024-10-24 | End: 2024-11-01 | Stop reason: HOSPADM

## 2024-10-24 RX ORDER — LISINOPRIL 20 MG/1
20 TABLET ORAL DAILY
Status: DISCONTINUED | OUTPATIENT
Start: 2024-10-24 | End: 2024-10-24

## 2024-10-24 RX ORDER — ATENOLOL 25 MG/1
25 TABLET ORAL DAILY
Status: DISCONTINUED | OUTPATIENT
Start: 2024-10-24 | End: 2024-10-24

## 2024-10-24 RX ADMIN — INSULIN ASPART 1 UNITS: 100 INJECTION, SOLUTION INTRAVENOUS; SUBCUTANEOUS at 18:44

## 2024-10-24 RX ADMIN — CEFEPIME 2 G: 2 INJECTION, POWDER, FOR SOLUTION INTRAVENOUS at 13:06

## 2024-10-24 RX ADMIN — MAGNESIUM SULFATE IN WATER FOR 4 G: 40 INJECTION INTRAVENOUS at 22:16

## 2024-10-24 RX ADMIN — PHYTONADIONE 10 MG: 10 INJECTION, EMULSION INTRAMUSCULAR; INTRAVENOUS; SUBCUTANEOUS at 18:16

## 2024-10-24 RX ADMIN — METRONIDAZOLE 500 MG: 500 INJECTION, SOLUTION INTRAVENOUS at 13:39

## 2024-10-24 RX ADMIN — HYDROMORPHONE HYDROCHLORIDE 0.5 MG: 1 INJECTION, SOLUTION INTRAMUSCULAR; INTRAVENOUS; SUBCUTANEOUS at 12:11

## 2024-10-24 RX ADMIN — GADOBUTROL 7 ML: 604.72 INJECTION INTRAVENOUS at 21:58

## 2024-10-24 RX ADMIN — FOLIC ACID: 5 INJECTION, SOLUTION INTRAMUSCULAR; INTRAVENOUS; SUBCUTANEOUS at 16:02

## 2024-10-24 RX ADMIN — HYDROMORPHONE HYDROCHLORIDE 2 MG: 2 TABLET ORAL at 22:29

## 2024-10-24 RX ADMIN — SODIUM CHLORIDE 63 ML: 9 INJECTION, SOLUTION INTRAVENOUS at 11:14

## 2024-10-24 RX ADMIN — DOCUSATE SODIUM 100 MG: 100 CAPSULE, LIQUID FILLED ORAL at 21:08

## 2024-10-24 RX ADMIN — IOPAMIDOL 80 ML: 755 INJECTION, SOLUTION INTRAVENOUS at 11:14

## 2024-10-24 RX ADMIN — CARBIDOPA AND LEVODOPA 1 TABLET: 25; 250 TABLET ORAL at 21:09

## 2024-10-24 ASSESSMENT — ACTIVITIES OF DAILY LIVING (ADL)
ADLS_ACUITY_SCORE: 0

## 2024-10-24 NOTE — ED NOTES
Patient was on call light asking to sit up more, patient was instructed on how to scoot up on the bed. CT in the room taking patient for imaging.

## 2024-10-24 NOTE — CONSULTS
GASTROENTEROLOGY CONSULTATION      Lamine Samano  [address]   [place] [address]  73 year old male     Admission Date/Time: 10/24/2024  Primary Care Provider: Rangel Kaur     We were asked to see the patient in consultation by Dr. Farmer for evaluation of liver lesions.    ASSESSMENT:    1.  Liver lesions-history of liver cancer, ablation x 1.  Details of any other treatment are uncertain.  We will have [facility] who follows the patient as an outpatient, see while he is here at [facility] and determine if this is his baseline or if this is a sign of progression of his malignancy.  We will pursue liver MRI.    2.  Multiple new subcapsular fluid collections along the anterior surface of the liver-hematoma versus abscess.  Agree with initiating antibiotics.  We will pursue liver MRI to evaluate these fluid collections and the liver lesions.    3.  Cirrhotic appearance of liver on CT-patient does have a history of heavy alcohol use and is still drinking 2 drinks per night.  He does not report being followed by a GI or liver provider in the past.  We will monitor for complications and we discussed the importance of stopping alcohol.    4.  Imaging signs of possible cholecystitis-this could be reactive to the adjacent inflammation along the liver, possibly from previous trauma.  Given the right upper quadrant pain, we will pursue surgery consult for further management.    5.  Segmental wall thickening along the ascending and proximal transverse colon-patient has no diarrhea.  Suspect this is reactive from the adjacent inflammation along the liver.  No further workup needed at this time, unless he comes symptomatic.  Any future colonoscopy as an outpatient to investigate the colonic thickening will depend on overall prognosis from the liver cancer standpoint.    RECOMMENDATIONS:  -Proceed with antibiotics  - Liver MRI  - Surgery consult  - [facility] consult  - Agree with ID  consult    Romero Wells MD   [facility]  [phone]      ________________________________________________________________________        HPI:  Lamine Samano is a 73 year old male who has a history of alcohol abuse, Parkinson's disease, prostate cancer on androgen deprivation therapy, hypertension, type 2 diabetes, cirrhosis of the liver.  He reports he was diagnosed with liver cancer (per chart hepatocellular carcinoma) in the spring 2024.  He had ablation x 1, however, the chart reports possibly every 3 months.  He is managed by [facility].  He has not been seen in our clinic per my chart review.  He denies seeing a previous GI provider or liver specialist.    Patient reports that 9 days ago he was going to the bathroom at night and had a fall.  He landed on the cut out of his tub right on his abdomen.  Since that time has had abdominal pain.  He has been having difficulty time just bending over to tie his shoes.  He denies any nausea or vomiting.  He does not report pain at right, but he has tenderness on exam.  He denies fevers, does report 5 pounds of weight loss in the last week.  In the past he used to drink 9-11 beers and drinks per night.  He is now down to 2 drinks per night.     ROS: A comprehensive ten point review of systems was negative aside from those in mentioned in the HPI.      PAST MEDICAL HISTORY:  Past Medical History:   Diagnosis Date    Parkinson disease (H)      PAST SURGICAL HISTORY:  Past Surgical History:   Procedure Laterality Date    REMOVAL OF SPERM DUCT(S)      Description: Surgery Of Male Genitalia Vasectomy;  Recorded: 06/07/2009;     SOCIAL HISTORY:  Social History     Tobacco Use    Smoking status: Former     FAMILY HISTORY:  Family History   Problem Relation Age of Onset    Diabetes Type 2  Brother     Diabetes Brother      ALLERGIES:   Allergies   Allergen Reactions    Levofloxacin Swelling     Ruptured both achilles tendons     Penicillins Hives      MEDICATIONS:   Prior to Admission medications    Medication Sig Start Date End Date Taking? Authorizing Provider   atenolol (TENORMIN) 25 MG tablet Take 1 tablet by mouth daily 4/10/22   Unknown, Entered By History   carbidopa-levodopa (SINEMET)  MG tablet Take 1 tablet by mouth 2 times daily 11/22/21   Unknown, Entered By History   carbidopa-levodopa (SINEMET)  MG tablet Take 1 tablet by mouth daily as needed (in the afternoon if needed)    Unknown, Entered By History   cholecalciferol, vitamin D3, (VITAMIN D3) 2,000 unit cap Take 2,000 Units by mouth daily 3/3/15   Provider, Historical   cyanocobalamin (VITAMIN B-12) 1000 MCG tablet Take 1 tablet (1,000 mcg) by mouth daily 10/21/23   Quincy Mcneal DO   doxepin (SINEQUAN) 10 MG capsule Take 10 mg by mouth at bedtime 8/15/23   Unknown, Entered By History   folic acid (FOLVITE) 1 MG tablet Take 1 mg by mouth daily 6/9/22   Unknown, Entered By History   hydrOXYzine (ATARAX) 50 MG tablet Take 50 mg by mouth at bedtime 8/28/23   Unknown, Entered By History   ibuprofen (ADVIL/MOTRIN) 200 MG tablet Take 200 mg by mouth every 8 hours as needed for pain    Unknown, Entered By History   lisinopril (ZESTRIL) 20 MG tablet Take 1 tablet by mouth daily 4/10/22   Unknown, Entered By History   mirtazapine (REMERON) 45 MG tablet [MIRTAZAPINE (REMERON) 45 MG TABLET] TAKE ONE TABLET BY MOUTH EVERY NIGHT AT BEDTIME 6/28/17   Gregg Crowe MD   mupirocin (BACTROBAN) 2 % external ointment Apply topically daily Apply to wound until resolved 9/6/23   Unknown, Entered By History   omeprazole (PRILOSEC) 20 MG DR capsule Take 20 mg by mouth daily 2/19/23   Unknown, Entered By History   potassium gluconate 550 mg (90 mg) tablet Take 90 mg by mouth daily 3/6/15   Provider, Historical   rosuvastatin (CRESTOR) 5 MG tablet Take 5 mg by mouth every morning 2/19/23   Unknown, Entered By History   traZODone (DESYREL) 100 MG tablet [TRAZODONE (DESYREL) 100 MG TABLET]  "TAKE 2 TABLETS BY MOUTH EVERY NIGHT AT BEDTIME 7/8/17   Gregg Crowe MD     PHYSICAL EXAM:   BP (!) 139/94   Pulse 112   Temp 98.9  F (37.2  C) (Oral)   Resp 18   Ht 1.753 m (5' 9\")   Wt 72.1 kg (159 lb)   SpO2 90%   BMI 23.48 kg/m     GEN: Alert, NAD.  JANNA: AT, anicteric, OP without erythema, exudate, or ulcers.    LYMPH: No LAD noted.  HRT: RRR, no JAQUAN  LUNGS: CTA  ABD: +BS, moderate right upper quadrant and epigastric tender, mildly distended, no rebound or guarding.  SKIN: No rash, jaundice   NEURO: MS intact       ADDITIONAL DATA:   I reviewed the patient's new clinical lab test results.   Recent Labs   Lab Test 10/24/24  1021 10/21/23  1112 10/20/23  1801 10/20/23  0943   WBC 17.4*  --  6.4 4.8   HGB 9.9* 9.0* 9.3* 5.9*   MCV 92  --  85 86     --  346 383     Recent Labs   Lab Test 10/24/24  1021 10/21/23  1112 10/20/23  0943   * 138 136   POTASSIUM 3.9 3.6 3.7   CHLORIDE 92* 103 100   CO2 23 22 20*   BUN 15.3 8.9 9.4   CR 0.85 0.63* 0.55*   ANIONGAP 16* 13 16*   JORGE 8.4* 8.0* 8.4*   * 108* 105*     Recent Labs   Lab Test 10/24/24  1021 10/21/23  1112 10/20/23  0943   ALBUMIN 3.0* 3.2* 3.3*   BILITOTAL 1.3* 1.1 0.4   ALT 16 <5 <5   AST 51* 23 25   LIPASE 23  --   --         I reviewed the patient's new imaging results.     CT ABDOMEN PELVIS W CONTRAST 10/24/2024 11:24 AM     CLINICAL HISTORY: diffuse pain, anterior trauma last week, h/o CA     TECHNIQUE: CT scan of the abdomen and pelvis was performed following  injection of IV contrast. Multiplanar reformats were obtained. Dose  reduction techniques were used.  CONTRAST: 80mL [id number]     COMPARISON: CT abdomen and pelvis performed on 10/20/2023, 1/29/2024     FINDINGS:   LOWER CHEST: Subpleural fibrotic changes are again seen along the lung  bases with superimposed subsegmental atelectasis.     HEPATOBILIARY: Diffuse hepatic steatosis is present with nodular  contour compatible with cirrhosis. Enlarging mass is seen within " the  right hepatic lobe measuring 2.2 cm compared with the prior  measurement of approximately 1.2 cm (series 3, image 23). Another 2.7  cm hypoattenuating lesion along the anterior right hepatic lobe  previously measured 1.7 cm (series 3, image 25) and may reflect an  ablation cavity from recent microwave ablation. 2.2 x 1.9 cm  ill-defined mass in the left hepatic lobe appears more heterogenous  and decreased from the prior measurement of 2.6 x 2.5 cm (series 3,  image 21). Multiple new subcapsular fluid collections are seen along  the anterior surface of the liver. The largest collection measures up  to 5.5 x 2.8 cm along the anterior left hepatic lobe (series 3, image  20). There are at least 4 other collections seen measuring 5.2 x 2.6  cm, 2.9 x 2.3 cm, 3.3 x 3.5 cm and 4.0 x 1.1 cm (series 3, images 11,  14, 22 and 29). Moderate surrounding fat stranding and soft tissue  thickening is also seen. Air and gas containing collection measuring  3.3 x 1.5 cm is noted along the inferior right hepatic lobe adjacent  to the inflamed hepatic flexure of the colon described below (series  5, image 42). Gallbladder appears mildly distended measuring up to 3.5  cm in diameter with wall thickening and surrounding fluid.     PANCREAS: No significant mass, duct dilatation, or inflammatory  change.     SPLEEN: Normal size.     ADRENAL GLANDS: No significant nodules.     KIDNEYS/BLADDER: No hydronephrosis is present. Stable subcentimeter  hypodense lesions on both kidneys which are too small to characterize  and favored to reflect small cysts.     BOWEL: Colonic diverticulosis without evidence of diverticulitis.  Segmental wall thickening is seen along the ascending and proximal  transverse colon at the hepatic flexure with mild surrounding fat  stranding (series 3, image 32). No evidence of bowel obstruction is  present.     PELVIC ORGANS: Dystrophic calcification is seen in the prostate gland.  Stable small fat-containing  right inguinal hernia. Previously noted  hyperdense focus within the right posterior peripheral zone of the  prostate gland appears less conspicuous.     ADDITIONAL FINDINGS: Severe vascular calcifications are seen in the  abdominal aorta and iliac branches.     MUSCULOSKELETAL: Multilevel degenerative changes are present in the  spine. Remote fracture seen along several right posterior lower ribs  as well as the right L1 transverse process.                                                                      IMPRESSION:   1.  Cirrhotic appearance of the liver as described above. Multiple  subcapsular fluid collections are seen along the anterior portion of  the liver with moderate surrounding inflammatory changes. Findings may  reflect posttraumatic collections with hemorrhage given history of  recent trauma. Abscess collections are also in the differential  diagnosis. At least one collection along the inferior right hepatic  lobe has internal locule of gas as well as peripheral enhancement  suspicious for an abscess.  2.  Multiple liver lesions are present including an enlarging 2.2 cm  mass in the right hepatic lobe suspicious for malignancy.  Characterization of these lesions is limited due to single phase exam  however. A 2.2 cm lesion in the left hepatic lobe appears more  heterogenous and decreased in size from the prior exam. Furthermore,  another 2.7 cm hypoattenuating lesion in the right hepatic lobe  appears increased in size but more fluidlike in appearance. Both of  these lesions may reflect ablation cavities from recent microwave  ablation for known hepatocellular carcinoma. Recommend further  characterization of these findings with follow-up MRI liver protocol  exam.  3.  Mild gallbladder wall thickening and pericholecystic fluid.  Findings may be reactive from adjacent inflammation along the liver  described above. Cholecystitis however cannot be excluded. Suggest  correlation with laboratory values  and consider follow-up abdominal  ultrasound.  4.  Segmental wall thickening is seen along the ascending and proximal  transverse colon at the hepatic flexure. Findings are suggestive of  colitis which may be reactive from adjacent inflammation along the  liver. Infectious versus infiltrate colitis cannot be excluded. An  underlying colonic mass is considered to be less likely but cannot be  excluded given the focal area of wall thickening. If further concern,  follow-up colonoscopy can be considered after acute symptoms resolve.

## 2024-10-24 NOTE — CONSULTS
[facility]    Hematology / Oncology Consultation     Primary oncologist: Dr Patrick Dreyer    Date of Admission:  10/24/2024    Assessment & Plan     1.  Hepatocellular carcinoma multifocal treated with microwave ablation on June 6, 2024 3 lesion treated as detailed below.    2.  Admission to the hospital with abdominal pain and additional areas of subcapsular fluid collection concerning for bleeding versus infection.    3.  Leukocytosis, likely reactive concerning for infection but can also be due to hemorrhage and malignancy.    4.  Anemia likely associated with malignancy and/or bleeding.    5.  Prostate cancer [place] 8 diagnosed in December 2023. He is treated with hormone therapy ADT by urology, no evidence of metastatic disease.    6.  Mild prolongation of INR 1.25 and bilirubin 1.3.    7.  Concern for cholelithiasis and colitis on CT.    Discussion and recommendations:  The lesions on the liver are more consistent with fluid collection bleeding versus infection, the trauma is more consistent with bleeding.  He has mild prolongation of INR could be from liver dysfunction/cirrhosis vs vitamin K deficiency, I will proceed with vitamin K dose.    Surgical team was requested by GI.    ID is on the case there is no high suspicion for infection.  Progression of the liver cancer is less likely.  His alpha-fetoprotein in May and June was normal, I will obtain a new baseline.  Will add fibrinogen level, Check CBC tomorrow.    I appreciate the opportunity to participate in the care of of Lamine Selwyn, Dr. Dreyer and our team will follow while hospitalized.      Ana Hyde MD    Code Status    Full Code    Reason for Consult   Reason for consult: Hepatocellular carcinoma    Primary Care Physician   EDLANE[facility]    Chief Complaint     Abdominal pain    Medical records reviewed, history obtained, patient examined.      History of Present Illness   Lamine Samano is a 73 year old male  with a history of parkinson's disease, hyperlipidemia, hepatocellular carcinoma who presents with abdominal pain. He sustained a fall into his bathtub a week ago and had persistent pain in the middle of the abdomen after hitting his abdomen during the fall. Patient was advised by his primary care provider to present to the emergency department due to his lab testing and fall.     He denies any fever, no diarrhea, he was constipated for few days and took stool softeners.    He gets hormone treatment through urology presumably leuprolide last dose was in August which is a 4-month dose over the next planned in December.He tolerates the treatment but he does have hot flashes.  He denies any bleeding or ecchymoses.    CT abdomen and pelvis 10/24/24 Obtained on admission:  HEPATOBILIARY: Diffuse hepatic steatosis is present with nodular   contour compatible with cirrhosis. Enlarging mass is seen within the   right hepatic lobe measuring 2.2 cm compared with the prior   measurement of approximately 1.2 cm (series 3, image 23). Another 2.7   cm hypoattenuating lesion along the anterior right hepatic lobe   previously measured 1.7 cm (series 3, image 25) and may reflect an   ablation cavity from recent microwave ablation. 2.2 x 1.9 cm   ill-defined mass in the left hepatic lobe appears more heterogenous   and decreased from the prior measurement of 2.6 x 2.5 cm (series 3,   image 21). Multiple new subcapsular fluid collections are seen along   the anterior surface of the liver. The largest collection measures up   to 5.5 x 2.8 cm along the anterior left hepatic lobe (series 3, image   20). There are at least 4 other collections seen measuring 5.2 x 2.6   cm, 2.9 x 2.3 cm, 3.3 x 3.5 cm and 4.0 x 1.1 cm (series 3, images 11,   14, 22 and 29). Moderate surrounding fat stranding and soft tissue   thickening is also seen. Air and gas containing collection measuring   3.3 x 1.5 cm is noted along the inferior right hepatic lobe  adjacent   to the inflamed hepatic flexure of the colon described below (series   5, image 42). Gallbladder appears mildly distended measuring up to 3.5   cm in diameter with wall thickening and surrounding fluid.   Impression:  1.  Cirrhotic appearance of the liver as described above. Multiple   subcapsular fluid collections are seen along the anterior portion of   the liver with moderate surrounding inflammatory changes. Findings may   reflect posttraumatic collections with hemorrhage given history of   recent trauma. Abscess collections are also in the differential   diagnosis. At least one collection along the inferior right hepatic   lobe has internal locule of gas as well as peripheral enhancement   suspicious for an abscess.   2.  Multiple liver lesions are present including an enlarging 2.2 cm   mass in the right hepatic lobe suspicious for malignancy.   Characterization of these lesions is limited due to single phase exam   however. A 2.2 cm lesion in the left hepatic lobe appears more   heterogenous and decreased in size from the prior exam. Furthermore,   another 2.7 cm hypoattenuating lesion in the right hepatic lobe   appears increased in size but more fluidlike in appearance. Both of   these lesions may reflect ablation cavities from recent microwave   ablation for known hepatocellular carcinoma. Recommend further   characterization of these findings with follow-up MRI liver protocol   exam.   3. Mild gallbladder wall thickening and pericholecystic fluid.   Findings may be reactive from adjacent inflammation along the liver   described above. Cholecystitis however cannot be excluded. Suggest   correlation with laboratory values and consider follow-up abdominal   ultrasound.   4. Segmental wall thickening is seen along the ascending and proximal   transverse colon at the hepatic flexure. Findings are suggestive of   colitis which may be reactive from adjacent inflammation along the   liver. Infectious  versus infiltrate colitis cannot be excluded. An   underlying colonic mass is considered to be less likely but cannot be   excluded given the focal area of wall thickening. If further concern,   follow-up colonoscopy can be considered after acute symptoms resolve.     He had microwave ablation of the liver on June 6, 2024 to 3 sites of multifocal hepatocellular carcinoma, the largest lesion in segment 3 and the other 2 lesions segment 5 and segment 4B      MRI on August 30, 2024 showed ablation defects identified in segment 3, 4B and 5 of the liver.  1.6 cm LR for lesion segment 8 follow-up in 3 months suggested.      Visit summary from [facility] July 25, 2024 by Dr. Dreyer:    Assessment  1. Hepatocellular carcinoma  - 3/1/24 MRI liver with 3 lesions in the bilateral lobes measuring up to 2.6cm   - With background cirrhosis   - PSMA scan identified the liver lesions with variable activity   - s/p microwave ablation 6/6/24 with Dr. Gonsalez      2. Prostate cancer  - [place]ha 4+4=8 on 1/4/24  - NM bone scan negative, PSMA scan with indeterminate lesion in proximal R femur, activity in the prostate gland and also the liver  - PSA was WNL     3. Anemia  - history of acute anemia requiring prbc transfusion  - Previous work up negative and hemoglobin has since recovered  - EGD/colonoscopy 11/2023 without varices per the patient     4. Alcoholism   - With cirrhosis  - Will check hepatitis panel     5. Femur lesion   - Biopsy was negative for malignancy     6. Hot flashes  - About 5 per day lasting a few minutes  - Not affecting QOL    Plan  Previously discussed case w/ Urology. Plan is to keep him on ADT and if he has no progression of his HCC at the 6 month follow up scan we will send him for definitive treatment to the prostate.   3 month follow up scans are scheduled through Dr. Gonsalez   Will repeat CT CAP in 4 months along with MRI (this will be the 6 month ozzie scan)  RTC in 4 months   All questions answered    Continue ADT through Dr. Mark     Advanced Care Planning  Not discussed at this visit.  Pain Scale on Today's Visit  0  Pain Plan on Today's Visit    Date of Service: 07/25/2024  Pain Scale (0-10): 0  Pain Treatment Plan: No pain reported  Comment:      Smoking Status  Smoking Tobacco : Former smoker; Smokeless Tobacco : Never used smokeless tobacco; Vaping : Never vaped  Depression Screening Tool Status  Not screened on today  ______________________________________________________________________________________________    History of Present Illness  The patient is a 73-year-old male who presented to us on 5/2/2024 for the further evaluation of a new diagnosis of hepatocellular carcinoma.  The patient was in his normal state of health when he was undergoing workup for an enlarged prostate.  An MRI of the prostate revealed a highly suspicious lesion.  A prostate biopsy was performed which revealed an adenocarcinoma which was [place] 8.  As part of the workup the patient was being evaluated for metastatic disease.  There were some equivocal lesions in the liver.  Ultimately this required a biopsy to diagnose hepatocellular carcinoma.  The patient has lost about 15 pounds over the past 2 to 3 months.  He denies any bleeding.  Around October or November 2023 he was being evaluated for anemia.  Upper and lower endoscopy were negative for sources of bleed.  His hemoglobin since recovered.  He was being seen by nurse practitioner was suspicious he may have had iron deficiency.  He drinks alcohol daily.  He is not trying to quit alcohol.      MRI prostate 12/20/2023 PI-RADS 5 with suspected extracapsular extension and possible neurovascular bundle invasion, no lymph node involvement.    1/4/2024 prostate biopsy revealed a [place]ha 4+4 = 8 adenocarcinoma of the prostate.  PSA was 1.1 on 10/21/2023    1/29/2024 CT scan revealed prostate mass, cirrhotic liver with 3 enhancing masses possible HCC, mild portal  hypertension, small gastric varices, mild pulmonary fibrosis in lung bases    1/29/2024 nuclear medicine bone scan negative    2/21/2024 PSMA scan with prostate gland enhancement, multiple radiotracer avid hepatic mets, focal uptake of right proximal femoral diaphysis indeterminant    3/1/2024 MRI liver with 3 lesions in the liver the largest being 2.6 cm, present in both lobes of the liver.    4/16/2024 biopsy of liver lesion revealed moderately differentiated hepatocellular carcinoma, with background cirrhosis    - s/p microwave ablation 6/6/24 with Dr. Gonsalez    Interval History  Doing well. NO weight loss. Having hot flashes but otherwise tolerating ADT  Review of Systems  Remaining 14 point comprehensive review of systems within normal limits. NCCN Distress Thermometer and Problem List were collected and documented in the patient chart.  Remarkable symptoms and concerns were discussed with the patient.  Any additional follow-up is indicated in the plan.  Past Medical and Surgical History  Prostate cancer, hepatocellular carcinoma, shingles, alcoholism      Current Medications  Medication List  Name Date  Calcium 600 + D(3) (Calcium-Cholecalciferol Oral 600 mg-10 mcg (400 unit)) 05/02/2024  Carbidopa-Levodopa Oral 25 mg-100 mg 05/02/2024  Folic Acid Oral 05/02/2024  Potassium Gluconate Oral 05/02/2024  Trazodone Oral 05/02/2024  Hydroxyzine HCl Oral 05/02/2024  Triamcinolone Topical Cream 0.5 % 05/02/2024  Omeprazole Oral Delayed Release Capsule 05/02/2024  Atenolol Oral 05/02/2024  Doxepin Oral (Sinequan) 05/02/2024  Vitamin B-12 (Cyanocobalamin Oral) 05/02/2024  Mirtazapine Oral 05/02/2024  Furosemide Oral 05/02/2024    Allergies  Penicillins and levothyroxine  Family History  Denies family history of malignancy      Social History  He has 1 son and 1 daughter.  His daughter presented with him today.  He is .  He previously smoked cigarettes but quit in 2006.  He drinks at least 3 glasses of scotch per  night.      Vital Signs  Blood pressure: 119/81, Pulse: 74, Temperature: 97.3 F, Respirations: 16, O2 sat: 96%, Pain Scale: 0, Height: 68.4 in, Weight: 173.4 lb, BSA: 1.93, BMI: 26.06 kg/m2  Covid-19 vaccine (Moderna) (05/02/2024), Patient declined/rejected; Flu vaccine - Adult (05/02/2024), Patient declined/rejected  Performance Status ECOG or Karnofsky  ECOG: Not recorded  Karnofsky: Not recorded    Physical Exam  The patient is in no distress, alert and oriented.   The sclerae are nonicteric and the pupils are equal, round  The ears and nose are unremarkable to inspection.   There is no unusual skin rash or petechiae.   I could not palpate any lymphadenopathy.   The heart has a regular rate and rhythm without murmur or rub.   The lungs are clear to auscultation bilaterally  The abdomen is soft and nontender without palpable mass or hepatosplenomegaly. There is no unusual pedal edema.   Gait is normal.    Past Medical History   I have reviewed this patient's medical history and updated it with pertinent information if needed.   Past Medical History:   Diagnosis Date    Parkinson disease (H)        Past Surgical History   I have reviewed this patient's surgical history and updated it with pertinent information if needed.  Past Surgical History:   Procedure Laterality Date    REMOVAL OF SPERM DUCT(S)      Description: Surgery Of Male Genitalia Vasectomy;  Recorded: 06/07/2009;       Prior to Admission Medications   Prior to Admission Medications   Prescriptions Last Dose Informant Patient Reported? Taking?   carbidopa-levodopa (SINEMET)  MG tablet 10/23/2024  Yes Yes   Sig: Take 1 tablet by mouth 2 times daily   doxepin (SINEQUAN) 10 MG capsule   Yes Yes   Sig: Take 10 mg by mouth. Patient takes either doxepin & hydroxyzine OR trazodone QHS   folic acid (FOLVITE) 1 MG tablet 10/23/2024  Yes Yes   Sig: Take 1 mg by mouth daily   furosemide (LASIX) 20 MG tablet   Yes Yes   Sig: Take 20 mg by mouth daily as  needed.   hydrOXYzine (ATARAX) 50 MG tablet   Yes Yes   Sig: Take 50 mg by mouth. Patient either takes doxepin & hydroxyzine or trazodone QHS   traZODone (DESYREL) 100 MG tablet 10/23/2024  No Yes   Sig: [TRAZODONE (DESYREL) 100 MG TABLET] TAKE 2 TABLETS BY MOUTH EVERY NIGHT AT BEDTIME   Patient taking differently: Take 200 mg by mouth. Patient takes this OR doxepin & hydroxyzine QHS      Facility-Administered Medications: None     Allergies   Allergies   Allergen Reactions    Levofloxacin Swelling     Ruptured both achilles tendons     Penicillins Hives       Social History   I have reviewed this patient's social history and updated it with pertinent information if needed. Lamine Samano  reports that he has quit smoking. He does not have any smokeless tobacco history on file.    Family History   I have reviewed this patient's family history and updated it with pertinent information if needed.   Family History   Problem Relation Age of Onset    Diabetes Type 2  Brother     Diabetes Brother        Review of Systems   The 10 point Review of Systems is negative other than noted in the HPI    Physical Exam   Temp: 98.9  F (37.2  C) Temp src: Oral BP: (!) 139/94 Pulse: 112   Resp: 18 SpO2: 90 % O2 Device: None (Room air)    Vital Signs with Ranges  Temp:  [98.3  F (36.8  C)-98.9  F (37.2  C)] 98.9  F (37.2  C)  Pulse:  [111-112] 112  Resp:  [16-18] 18  BP: (139-143)/(72-94) 139/94  SpO2:  [90 %-96 %] 90 %  159 lbs 0 oz    Pleasant in no acute distress  HEENT normocephalic atraumatic sclera anicteric.  Neck supple no JVD.  Lungs: No respiratory distress no wheezing.  Abdomen: Soft moderate tenderness mostly on the right and mid abdomen.  Extremities no edema.  Lymphatic: Lymphadenopathy in cervical supraclavicular axillary epitrochlear or inguinal areas.  Neurological: Nonfocal.  Skin: Limited exam shows no petechia or ecchymosis.    Data   Results for orders placed or performed during the hospital encounter of 10/24/24  (from the past 24 hours)   CBC with platelets + differential    Narrative    The following orders were created for panel order CBC with platelets + differential.  Procedure                               Abnormality         Status                     ---------                               -----------         ------                     CBC with platelets and d...[[id number]]  Abnormal            Final result                 Please view results for these tests on the individual orders.   Comprehensive metabolic panel   Result Value Ref Range    Sodium 131 (L) 135 - 145 mmol/L    Potassium 3.9 3.4 - 5.3 mmol/L    Carbon Dioxide (CO2) 23 22 - 29 mmol/L    Anion Gap 16 (H) 7 - 15 mmol/L    Urea Nitrogen 15.3 8.0 - 23.0 mg/dL    Creatinine 0.85 0.67 - 1.17 mg/dL    GFR Estimate >90 >60 mL/min/1.73m2    Calcium 8.4 (L) 8.8 - 10.4 mg/dL    Chloride 92 (L) 98 - 107 mmol/L    Glucose 125 (H) 70 - 99 mg/dL    Alkaline Phosphatase 168 (H) 40 - 150 U/L    AST 51 (H) 0 - 45 U/L    ALT 16 0 - 70 U/L    Protein Total 7.2 6.4 - 8.3 g/dL    Albumin 3.0 (L) 3.5 - 5.2 g/dL    Bilirubin Total 1.3 (H) <=1.2 mg/dL   Lipase   Result Value Ref Range    Lipase 23 13 - 60 U/L   CBC with platelets and differential   Result Value Ref Range    WBC Count 17.4 (H) 4.0 - 11.0 10e3/uL    RBC Count 3.23 (L) 4.40 - 5.90 10e6/uL    Hemoglobin 9.9 (L) 13.3 - 17.7 g/dL    Hematocrit 29.7 (L) 40.0 - 53.0 %    MCV 92 78 - 100 fL    MCH 30.7 26.5 - 33.0 pg    MCHC 33.3 31.5 - 36.5 g/dL    RDW 14.4 10.0 - 15.0 %    Platelet Count 367 150 - 450 10e3/uL    % Neutrophils 83 %    % Lymphocytes 8 %    % Monocytes 7 %    % Eosinophils 0 %    % Basophils 1 %    % Immature Granulocytes 1 %    NRBCs per 100 WBC 0 <1 /100    Absolute Neutrophils 14.5 (H) 1.6 - 8.3 10e3/uL    Absolute Lymphocytes 1.3 0.8 - 5.3 10e3/uL    Absolute Monocytes 1.2 0.0 - 1.3 10e3/uL    Absolute Eosinophils 0.0 0.0 - 0.7 10e3/uL    Absolute Basophils 0.1 0.0 - 0.2 10e3/uL    Absolute Immature  Granulocytes 0.2 <=0.4 10e3/uL    Absolute NRBCs 0.0 10e3/uL   Magnesium   Result Value Ref Range    Magnesium 1.2 (L) 1.7 - 2.3 mg/dL   Phosphorus   Result Value Ref Range    Phosphorus 2.8 2.5 - 4.5 mg/dL   CT Abdomen Pelvis w Contrast    Narrative    CT ABDOMEN PELVIS W CONTRAST 10/24/2024 11:24 AM    CLINICAL HISTORY: diffuse pain, anterior trauma last week, h/o CA    TECHNIQUE: CT scan of the abdomen and pelvis was performed following  injection of IV contrast. Multiplanar reformats were obtained. Dose  reduction techniques were used.  CONTRAST: 80mL [id number]    COMPARISON: CT abdomen and pelvis performed on 10/20/2023, 1/29/2024    FINDINGS:   LOWER CHEST: Subpleural fibrotic changes are again seen along the lung  bases with superimposed subsegmental atelectasis.    HEPATOBILIARY: Diffuse hepatic steatosis is present with nodular  contour compatible with cirrhosis. Enlarging mass is seen within the  right hepatic lobe measuring 2.2 cm compared with the prior  measurement of approximately 1.2 cm (series 3, image 23). Another 2.7  cm hypoattenuating lesion along the anterior right hepatic lobe  previously measured 1.7 cm (series 3, image 25) and may reflect an  ablation cavity from recent microwave ablation. 2.2 x 1.9 cm  ill-defined mass in the left hepatic lobe appears more heterogenous  and decreased from the prior measurement of 2.6 x 2.5 cm (series 3,  image 21). Multiple new subcapsular fluid collections are seen along  the anterior surface of the liver. The largest collection measures up  to 5.5 x 2.8 cm along the anterior left hepatic lobe (series 3, image  20). There are at least 4 other collections seen measuring 5.2 x 2.6  cm, 2.9 x 2.3 cm, 3.3 x 3.5 cm and 4.0 x 1.1 cm (series 3, images 11,  14, 22 and 29). Moderate surrounding fat stranding and soft tissue  thickening is also seen. Air and gas containing collection measuring  3.3 x 1.5 cm is noted along the inferior right hepatic lobe adjacent  to  the inflamed hepatic flexure of the colon described below (series  5, image 42). Gallbladder appears mildly distended measuring up to 3.5  cm in diameter with wall thickening and surrounding fluid.    PANCREAS: No significant mass, duct dilatation, or inflammatory  change.    SPLEEN: Normal size.    ADRENAL GLANDS: No significant nodules.    KIDNEYS/BLADDER: No hydronephrosis is present. Stable subcentimeter  hypodense lesions on both kidneys which are too small to characterize  and favored to reflect small cysts.    BOWEL: Colonic diverticulosis without evidence of diverticulitis.  Segmental wall thickening is seen along the ascending and proximal  transverse colon at the hepatic flexure with mild surrounding fat  stranding (series 3, image 32). No evidence of bowel obstruction is  present.    PELVIC ORGANS: Dystrophic calcification is seen in the prostate gland.  Stable small fat-containing right inguinal hernia. Previously noted  hyperdense focus within the right posterior peripheral zone of the  prostate gland appears less conspicuous.    ADDITIONAL FINDINGS: Severe vascular calcifications are seen in the  abdominal aorta and iliac branches.    MUSCULOSKELETAL: Multilevel degenerative changes are present in the  spine. Remote fracture seen along several right posterior lower ribs  as well as the right L1 transverse process.      Impression    IMPRESSION:   1.  Cirrhotic appearance of the liver as described above. Multiple  subcapsular fluid collections are seen along the anterior portion of  the liver with moderate surrounding inflammatory changes. Findings may  reflect posttraumatic collections with hemorrhage given history of  recent trauma. Abscess collections are also in the differential  diagnosis. At least one collection along the inferior right hepatic  lobe has internal locule of gas as well as peripheral enhancement  suspicious for an abscess.  2.  Multiple liver lesions are present including an  enlarging 2.2 cm  mass in the right hepatic lobe suspicious for malignancy.  Characterization of these lesions is limited due to single phase exam  however. A 2.2 cm lesion in the left hepatic lobe appears more  heterogenous and decreased in size from the prior exam. Furthermore,  another 2.7 cm hypoattenuating lesion in the right hepatic lobe  appears increased in size but more fluidlike in appearance. Both of  these lesions may reflect ablation cavities from recent microwave  ablation for known hepatocellular carcinoma. Recommend further  characterization of these findings with follow-up MRI liver protocol  exam.  3.  Mild gallbladder wall thickening and pericholecystic fluid.  Findings may be reactive from adjacent inflammation along the liver  described above. Cholecystitis however cannot be excluded. Suggest  correlation with laboratory values and consider follow-up abdominal  ultrasound.  4.  Segmental wall thickening is seen along the ascending and proximal  transverse colon at the hepatic flexure. Findings are suggestive of  colitis which may be reactive from adjacent inflammation along the  liver. Infectious versus infiltrate colitis cannot be excluded. An  underlying colonic mass is considered to be less likely but cannot be  excluded given the focal area of wall thickening. If further concern,  follow-up colonoscopy can be considered after acute symptoms resolve.    MONICA KHAN MD         SYSTEM ID:  [id number]   INR   Result Value Ref Range    INR 1.25 (H) 0.85 - 1.15   Partial thromboplastin time   Result Value Ref Range    aPTT 36 22 - 38 Seconds   Glucose by meter   Result Value Ref Range    GLUCOSE BY METER POCT 110 (H) 70 - 99 mg/dL

## 2024-10-24 NOTE — ED NOTES
"[facility]  ED Nurse Handoff Report    ED Chief complaint: Abdominal Pain      ED Diagnosis:   Final diagnoses:   Generalized abdominal pain   Hepatocellular carcinoma (H)       Code Status: TBA    Allergies:   Allergies   Allergen Reactions    Levofloxacin Swelling    Penicillins Hives       Patient Story: Pt is a 73 year old male  who presents with abdominal pain. Patient notes that he sustained a fall into his bathtub a week ago and sustained abdominal trauma. He states that his abdominal pain has not subsided. Patient states that he saw his primary care provider today for further lab work after seeing them a week ago. He states that his WBC was elevated and his hemoglobin was lower than usual. Patient notes that he was advised by his primary care provider to present to the emergency department due to his lab testing and fall.   Focused Assessment:  Pt is awake, alert and orientated X 4    Treatments and/or interventions provided: Labs, CT    Patient's response to treatments and/or interventions: Pt tolerated well     To be done/followed up on inpatient unit:      Does this patient have any cognitive concerns?:  none     Activity level - Baseline/Home:  Independent  Activity Level - Current:   Independent    Patient's Preferred language: English   Needed?: No    Isolation: None  Infection: Not Applicable  Patient tested for COVID 19 prior to admission: NO  Bariatric?: No    Vital Signs:   Vitals:    10/24/24 0912 10/24/24 1222   BP: (!) 143/72    Pulse: 111    Resp: 16    Temp: 98.3  F (36.8  C)    TempSrc: Temporal    SpO2: 96% 93%   Weight: 72.1 kg (159 lb)    Height: 1.753 m (5' 9\")        Cardiac Rhythm:     Was the PSS-3 completed:   Yes  What interventions are required if any?               Family Comments: Wife at bedside   OBS brochure/video discussed/provided to patient/family: No              Name of person given brochure if not patient:               Relationship to patient: "     For the majority of the shift this patient's behavior was Yellow.   Behavioral interventions performed were .    ED NURSE PHONE NUMBER: *[phone]

## 2024-10-24 NOTE — ED PROVIDER NOTES
"    Emergency Department Note      History of Present Illness     Chief Complaint   Abdominal Pain      HPI   Lamine Samano is a 73 year old male with a history of parkinson's disease, hyperlipidemia, hepatocellular carcinoma who presents with abdominal pain. Patient notes that he sustained a fall into his bathtub a week ago and sustained abdominal trauma. He states that his abdominal pain has not subsided. Patient states that he saw his primary care provider today for further lab work after seeing them a week ago. He states that his WBC was elevated and his hemoglobin was lower than usual. Patient notes that he was advised by his primary care provider to present to the emergency department due to his lab testing and fall.    Independent Historian   None    Review of External Notes     [place] [facility] [place]  Lamine Samano \"Deandre\" - 73 y.o. Male; born Nov. 04, 1950  Reason for Visit   Rechk lab work     Date Type Department Care Team (Latest Contact Info) Description   10/24/2024 8:20 AM CDT [place] [facility] [place]   [address]   [address]   [place] [address]   [phone]  Rangel Kaur MD   [address]   [address]   [place] [id number]   [phone] (Work)    Lab (Re[facility] lab work)     Social History  Tobacco Use Types Packs/Day Years Used Date   Smoking Tobacco: Former Cigarettes 2 39 01/01/1968 - 12/25/2006   Passive Smoke Exposure: Past           Smokeless Tobacco: Never             Comments: Quit smoking cigarettes in 2006- updated 11/9/23     Social History  Alcohol Use Standard Drinks/Week Comments   Yes 21 (1 standard drink = 0.6 oz pure alcohol) 3 drinks daily (scotch)-6/6/24     Last Filed Vital Signs  Vital Sign Reading Time Taken Comments   Blood Pressure 135/79 10/24/2024 8:24 AM CDT     Pulse 120 10/24/2024 8:24 AM CDT     Temperature - -     Respiratory Rate - -     Oxygen Saturation - -     Inhaled Oxygen Concentration - -  "    Weight 72.1 kg (159 lb) 10/24/2024 8:24 AM CDT     Height - -     Body Mass Index 23.31 10/16/2024 12:16 PM CDT       Patient Instructions  Rangel Kaur MD - 10/24/2024 8:20 AM CDT   Patient agrees to go directly to the ER for additional evaluation and treatment.  Electronically signed by Rangel Kaur MD at 10/24/2024 8:43 AM CDT     Progress Notes  Rangel Kaur MD - 10/24/2024 8:20 AM CDT  Nursing Notes:  Latia Haque MA 10/24/2024 8:30 AM Addendum  Chief Complaint  Patient presents with  Lab  Rechk lab work    Additional visit information (chief complaint/health maintenance) shared by patient: Patient here to recheck lab. Patient states fell last week and hit his chest so difficult to bend over.    Low Dose CT (for lung CA) age 50-80 11/09/2023      Lamine Samano is a 73 y.o. male who presents for  Chief Complaint  Patient presents with  Lab  Rechk lab work    Subjective    HPI: Mr. Samano presents with his wife for follow-up abdominal pain.  - complains of increased abdominal pain  - complains of increased weakness, fatigue  - loss of appetite, fewer BM's  - had two stools yesterday after taking OTC stool softener  - known liver cancer  - managed by Oncology  Active Problems reviewed and history is consistent with the EHR and nurses notes.    Patient Active Problem List  Diagnosis Date Noted  Leukocytosis 10/18/2024  10/16/2024 WBC: 15.7  10/16/2024 RBC: 3.22  10/16/2024 HGB: 10.0  10/16/2024 HCT: 31.0  10/16/2024 MCV: 96.3  10/16/2024 MCH: 31.1  10/16/2024 MCHC: 32.3  10/16/2024 RDW: 13.4  10/16/2024 PLT: 424      Type 2 diabetes mellitus with diabetic polyneuropathy, without long-term current use of insulin (HC) 09/19/2024  HEMOGLOBIN A1C SCREENING (%)  Date Value  10/11/2022 5.4    HEMOGLOBIN A1C (% of total Hgb)  Date Value  09/18/2024 6.3 (H)    GLUCOSE (mg/dL)  Date Value  09/18/2024 161 (H)  08/30/2024 117 (H)  10/11/2022 95      Alcohol use, unspecified with unspecified  alcohol-induced disorder (HC) 09/04/2024  Carcinoma of prostate (HC) 05/17/2024  Hepatocellular carcinoma (HC) 05/17/2024  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  Rangel Kaur MD .................... 5/17/2024 2:06 PM      Iron deficiency anemia 11/02/2023  Eosinophilic esophagitis 11/02/2023  Acute on chronic anemia 10/27/2023  Closed fracture of multiple ribs of right side with routine healing 10/27/2023  Subacute right posterior 10-12th rib and right L1 transverse process fractures with evidence of healing.    Symptomatic anemia 10/20/2023  Personal history of fall 10/12/2023  Parkinsonian tremor (HC) 04/12/2023  Neuropathy of both feet 10/11/2022  Evaluated by Dr Hong, Neurology    Lesion of liver greater than 1 cm in diameter 11/09/2021  Bilateral Achilles tendonosis 11/09/2021  Needs walker to ambulate longer distances.    Multiple lung nodules on CT 10/20/2021  10/20/21 LD Lung CT  Lung-RADS Category 2: Benign appearance or behavior. Management: Follow-up LD CT chest 12 months.    Pulmonary emphysema (HC) 10/20/2021  10/20/21 LD Lung CT    Grundy of foot 04/09/2021  right foot, little toe    Status post bilateral cataract extraction 02/17/2021  Dr Connelly    Hyponatremia 08/06/2020  Seborrheic dermatitis of scalp 10/09/2018  History of basal cell cancer  Gastroesophageal reflux disease 11/30/2017  Essential hypertension 09/12/2017  Muscle cramps 07/11/2017  Insomnia, idiopathic 07/11/2017  Dyslipidemia 07/11/2017  Sinus congestion 07/11/2017  Alcohol dependence, daily use (HC) 07/11/2017    Current Outpatient Medications  Medication Sig Dispense Refill  carbidopa-levodopa,  mg, (SINEMET )  mg tablet Take 1 Tablet by mouth two times daily. 0  cyanocobalamin (Vitamin B-12) 1,000 mcg tablet Take 1,000 mcg by mouth once daily.  doxepin  "(SINEQUAN) 10 mg capsule Take 10 mg by mouth at bedtime.  folic acid 1 mg tablet Take 1 Tablet (1 mg) by mouth once daily. 0  furosemide (LASIX) 20 mg tablet Take 1 Tablet (20 mg) by mouth once daily if needed (dyspnea, cough). 30 Tablet 3  hydrOXYzine HCL (ATARAX) 50 mg tablet Take 1 tab daily at bedtime for itching, sleep. 90 Tablet 1  metFORMIN (GLUCOPHAGE XR) 500 mg Extended-Release tablet Medication ON HOLD.  potassium gluconate 550 mg (90 mg) tab Take 1 Tab by mouth once daily.  traZODone (DESYREL) 100 mg tablet Take 1 Tablet (100 mg) by mouth at bedtime if needed for Sleep. 90 Tablet 1    ASSESSMENT AND PLAN:  ICD-10-CM   1. Acute abdominal pain R10.9   2. Hepatocellular carcinoma (HC) C22.0   3. Leukocytosis, unspecified type D72.829   4. Symptomatic anemia D64.9       I called ER and advised them that patient is traveling there by private car.  - his wife is driving    Patient Instructions   Patient agrees to go directly to the ER for additional evaluation and treatment.    Rangel Kaur MD .................... 10/24/2024 8:27 AM     Past Medical History     Medical History and Problem List   Basal carcinoma of skin  Parkinson's disease  Hypogonadism in male  Seborrheic Dermatitis  Benign Essential Hypertension  Alcohol Abuse  Hyperlipidemia  Insomnia  Dermatitis  Hyperglycemia  Benign Adenomatous Polyp Of The Large Intestine  Allergic Rhinitis    Medications   Remeron  Desyrel  Sinemet  Tenormin  Sinequan  Atarax  Zestril  Crestor    Surgical History   Past Surgical History:   Procedure Laterality Date    REMOVAL OF SPERM DUCT(S)      Description: Surgery Of Male Genitalia Vasectomy;  Recorded: 06/07/2009;       Physical Exam     Patient Vitals for the past 24 hrs:   BP Temp Temp src Pulse Resp SpO2 Height Weight   10/24/24 1352 (!) 139/94 98.9  F (37.2  C) Oral 112 18 90 % -- --   10/24/24 1222 -- -- -- -- -- 93 % -- --   10/24/24 0912 (!) 143/72 98.3  F (36.8  C) Temporal 111 16 96 % 1.753 m (5' 9\") " 72.1 kg (159 lb)     Physical Exam  Physical Exam   Nursing note and vitals reviewed.  General: Oriented to person, place, and time. Appears well-developed and well-nourished.   Head: No signs of trauma.   Mouth/Throat: Oropharynx is clear and moist.   Eyes: Conjunctivae are normal. Pupils are equal, round, and reactive to light.   Neck: Normal range of motion. No nuchal rigidity.   Cardiovascular: Normal rate and regular rhythm.    Respiratory: Effort normal and breath sounds normal. No respiratory distress.   Abdominal: Soft. Diffuse abdominal pain. There is no guarding.   Musculoskeletal: Normal range of motion. no edema.   Neurological: The patient is alert and oriented to person, place, and time.  PERRLA, EOMI, visual fields intact, strength in upper/lower extremities normal and symmetrical.   Sensation normal. Speech normal  GCS eye subscore is 4. GCS verbal subscore is 5. GCS motor subscore is 6.   Skin: Skin is warm and dry. No rash noted.   Psychiatric: normal mood and affect. behavior is normal.     Diagnostics     Lab Results   Labs Ordered and Resulted from Time of ED Arrival to Time of ED Departure   COMPREHENSIVE METABOLIC PANEL - Abnormal       Result Value    Sodium 131 (*)     Potassium 3.9      Carbon Dioxide (CO2) 23      Anion Gap 16 (*)     Urea Nitrogen 15.3      Creatinine 0.85      GFR Estimate >90      Calcium 8.4 (*)     Chloride 92 (*)     Glucose 125 (*)     Alkaline Phosphatase 168 (*)     AST 51 (*)     ALT 16      Protein Total 7.2      Albumin 3.0 (*)     Bilirubin Total 1.3 (*)    CBC WITH PLATELETS AND DIFFERENTIAL - Abnormal    WBC Count 17.4 (*)     RBC Count 3.23 (*)     Hemoglobin 9.9 (*)     Hematocrit 29.7 (*)     MCV 92      MCH 30.7      MCHC 33.3      RDW 14.4      Platelet Count 367      % Neutrophils 83      % Lymphocytes 8      % Monocytes 7      % Eosinophils 0      % Basophils 1      % Immature Granulocytes 1      NRBCs per 100 WBC 0      Absolute Neutrophils 14.5 (*)      Absolute Lymphocytes 1.3      Absolute Monocytes 1.2      Absolute Eosinophils 0.0      Absolute Basophils 0.1      Absolute Immature Granulocytes 0.2      Absolute NRBCs 0.0     LIPASE - Normal    Lipase 23     BLOOD CULTURE       Imaging   CT Abdomen Pelvis w Contrast   Final Result   IMPRESSION:    1.  Cirrhotic appearance of the liver as described above. Multiple   subcapsular fluid collections are seen along the anterior portion of   the liver with moderate surrounding inflammatory changes. Findings may   reflect posttraumatic collections with hemorrhage given history of   recent trauma. Abscess collections are also in the differential   diagnosis. At least one collection along the inferior right hepatic   lobe has internal locule of gas as well as peripheral enhancement   suspicious for an abscess.   2.  Multiple liver lesions are present including an enlarging 2.2 cm   mass in the right hepatic lobe suspicious for malignancy.   Characterization of these lesions is limited due to single phase exam   however. A 2.2 cm lesion in the left hepatic lobe appears more   heterogenous and decreased in size from the prior exam. Furthermore,   another 2.7 cm hypoattenuating lesion in the right hepatic lobe   appears increased in size but more fluidlike in appearance. Both of   these lesions may reflect ablation cavities from recent microwave   ablation for known hepatocellular carcinoma. Recommend further   characterization of these findings with follow-up MRI liver protocol   exam.   3.  Mild gallbladder wall thickening and pericholecystic fluid.   Findings may be reactive from adjacent inflammation along the liver   described above. Cholecystitis however cannot be excluded. Suggest   correlation with laboratory values and consider follow-up abdominal   ultrasound.   4.  Segmental wall thickening is seen along the ascending and proximal   transverse colon at the hepatic flexure. Findings are suggestive of   colitis  which may be reactive from adjacent inflammation along the   liver. Infectious versus infiltrate colitis cannot be excluded. An   underlying colonic mass is considered to be less likely but cannot be   excluded given the focal area of wall thickening. If further concern,   follow-up colonoscopy can be considered after acute symptoms resolve.      MONICA KHAN MD            SYSTEM ID:  [id number]      MR Liver wo & w Contrast    (Results Pending)   NM HepatOBiliary Scan    (Results Pending)     Independent Interpretation   Normal CT shows no evidence of free air.    ED Course      Medications Administered   Medications   HYDROmorphone (PF) (DILAUDID) injection 0.5 mg (0.5 mg Intravenous $Given 10/24/24 1211)   iopamidol (ISOVUE-370) solution 80 mL (80 mLs Intravenous $Given 10/24/24 1114)   Saline (63 mLs Intravenous $Given 10/24/24 1114)       Procedures   Procedures     Discussion of Management   Admitting Hospitalist, Dr. Farmer    ED Course   ED Course as of 10/24/24 1216   Thu Oct 24, 2024   0957 I obtained history and examined the patient as noted above       Additional Documentation  None    Medical Decision Making / Diagnosis     CMS Diagnoses: None    MIPS       None    MDM   Lamine Samano is a 73 year old male presents to the ED with generalized abdominal pain.  The patient has a history of hepatocellular carcinoma.  CT is indicating possible abdominal abscess versus hematoma.  The patient does have elevated white blood cell count.  Blood cultures pending.  IV antibiotics given.    CT abdomen indicates possible alternative causes to his symptoms.  Early cholecystitis versus colitis.  The patient will be admitted to the hospital for further evaluation and treatment    Disposition   The patient was admitted to the hospital.     Diagnosis     ICD-10-CM    1. Generalized abdominal pain  R10.84       2. Hepatocellular carcinoma (H)  C22.0              Scribe Disclosure:  Moriah POE, am serving as a  scribe at 9:33 AM on 10/24/2024 to document services personally performed by Juan Manuel Andrade MD based on my observations and the provider's statements to me.        Juan Manuel Andrade MD  10/24/24 [id number]

## 2024-10-24 NOTE — PHARMACY-ADMISSION MEDICATION HISTORY
Pharmacist Admission Medication History    Admission medication history is complete. The information provided in this note is only as accurate as the sources available at the time of the update.    Information Source(s): Patient, Family member, and CareEverywhere/SureScripts via in-person    Pertinent Information:   -numerous medications that have been filled recently or that were on med list have been discontinued recently per patient/family (atenolol, omeprazole, rosuvastatin, metformin, among others)  -patient reports he takes EITHER doxepin & hydroxyzine OR trazodone QHS    Changes made to PTA medication list:  Added: furosemide PRN  Deleted: atenolol (see note above), Sinemet PRN, vitamin D, vitamin B12, ibuprofen, lisinopril, mirtazapine, mupirocin, omeprazole (see note above), potassium gluconate, rosuvastatin (see note above)  Changed: doxepin/hydroxyzine (updated directions, see note above), trazodone (updated directions, see note above)    Allergies reviewed with patient and updates made in EHR: no    Medication History Completed By: Beverly Ramsey RPH 10/24/2024 2:52 PM    PTA Med List   Medication Sig Last Dose/Taking    carbidopa-levodopa (SINEMET)  MG tablet Take 1 tablet by mouth 2 times daily 10/23/2024    doxepin (SINEQUAN) 10 MG capsule Take 10 mg by mouth. Patient takes either doxepin & hydroxyzine OR trazodone QHS Taking    folic acid (FOLVITE) 1 MG tablet Take 1 mg by mouth daily 10/23/2024    furosemide (LASIX) 20 MG tablet Take 20 mg by mouth daily as needed. Taking As Needed    hydrOXYzine (ATARAX) 50 MG tablet Take 50 mg by mouth. Patient either takes doxepin & hydroxyzine or trazodone QHS Taking    traZODone (DESYREL) 100 MG tablet [TRAZODONE (DESYREL) 100 MG TABLET] TAKE 2 TABLETS BY MOUTH EVERY NIGHT AT BEDTIME (Patient taking differently: Take 200 mg by mouth. Patient takes this OR doxepin & hydroxyzine QHS) 10/23/2024

## 2024-10-24 NOTE — CONSULTS
[facility]    Consult Note - General Surgery Service  Date of Admission:  10/24/2024  Consult Requested by: Dr. Farmer  Reason for Consult: possible cholecystitis    Assessment & Plan: Surgery   Lamine Samano is a 73 year old male admitted on 10/24/2024. He has HCC and possible cholecystitis. I recommend a HIDA scan to determine whether his inflammation is due to cholecystitis. If it is would recommend cholecystostomy tube. Patient is not a surgical candidate.       Drains: None     Code Status: Full Code      Clinically Significant Risk Factors Present on Admission         # Hyponatremia: Lowest Na = 131 mmol/L in last 2 days, will monitor as appropriate  # Hypochloremia: Lowest Cl = 92 mmol/L in last 2 days, will monitor as appropriate    # Hypomagnesemia: Lowest Mg = 1.2 mg/dL in last 2 days, will replace as needed   # Hypoalbuminemia: Lowest albumin = 3 g/dL at 10/24/2024 10:21 AM, will monitor as appropriate  # Coagulation Defect: INR = 1.25 (Ref range: 0.85 - 1.15) and/or PTT = 36 Seconds (Ref range: 22 - 38 Seconds), will monitor for bleeding    # Hypertension: Noted on problem list    # Anemia: based on hgb <11                  Shelly Garcia MD  [facility]  Non-urgent messages: Securely message with [employer] (more info)  Text page via [employer] Paging/Directory     ______________________________________________________________________    Chief Complaint   Abdominal pain    History is obtained from the patient and his wife    History of Present Illness   Lamine Samano is a 73 year old male who states that he fell on 10/15 onto his abdomen. The next day he began having abdominal pain. He decided to wait to be seen because he had a meeting with his Oncologist the next week. The patient has known prostate and hepatocellular carcinoma for which he has undergone one microwave ablation. He was seen by his Oncologist who sent him to the ER. His CT scan is  concerning for cholecystitis. Patient is an active drinker who also has Parkinson's disease, DM and cirrhosis.      Past Medical History    Past Medical History:   Diagnosis Date    Parkinson disease (H)    ETOH abuse  Cirrhosis  HCC  Prostate Cancer  DM    Past Surgical History   Past Surgical History:   Procedure Laterality Date    REMOVAL OF SPERM DUCT(S)      Description: Surgery Of Male Genitalia Vasectomy;  Recorded: 06/07/2009;       Prior to Admission Medications   Medications Prior to Admission   Medication Sig Dispense Refill Last Dose/Taking    carbidopa-levodopa (SINEMET)  MG tablet Take 1 tablet by mouth 2 times daily   10/23/2024    doxepin (SINEQUAN) 10 MG capsule Take 10 mg by mouth. Patient takes either doxepin & hydroxyzine OR trazodone QHS   Taking    folic acid (FOLVITE) 1 MG tablet Take 1 mg by mouth daily   10/23/2024    furosemide (LASIX) 20 MG tablet Take 20 mg by mouth daily as needed.   Taking As Needed    hydrOXYzine (ATARAX) 50 MG tablet Take 50 mg by mouth. Patient either takes doxepin & hydroxyzine or trazodone QHS   Taking    traZODone (DESYREL) 100 MG tablet [TRAZODONE (DESYREL) 100 MG TABLET] TAKE 2 TABLETS BY MOUTH EVERY NIGHT AT BEDTIME (Patient taking differently: Take 200 mg by mouth. Patient takes this OR doxepin & hydroxyzine QHS) 180 tablet 1 10/23/2024          Social History   I have reviewed this patient's social history and updated it with pertinent information if needed.  Social History     Tobacco Use    Smoking status: Former         Family History   I have reviewed this patient's family history and updated it with pertinent information if needed.  Family History   Problem Relation Age of Onset    Diabetes Type 2  Brother     Diabetes Brother          Allergies   Allergies   Allergen Reactions    Levofloxacin Swelling     Ruptured both achilles tendons     Penicillins Hives        Physical Exam   Vital Signs: Temp: 98.9  F (37.2  C) Temp src: Oral BP: (!) 139/94  Pulse: 112   Resp: 18 SpO2: 90 % O2 Device: None (Room air)    Weight: 159 lbs 0 ozNo intake or output data in the 24 hours ending 10/24/24 1834  Gen: NAD  Neuro: AAO  Abd: soft, nondistended, tender with guarding in RUQ    Medical Decision Making       25 MINUTES SPENT BY ME on the date of service doing chart review, history, exam, documentation & further activities per the note.      Data     I have personally reviewed the following data over the past 24 hrs:    17.4 (H)  \   9.9 (L)   / 367     131 (L) 92 (L) 15.3 /  202 (H)   3.9 23 0.85 \     ALT: 16 AST: 51 (H) AP: 168 (H) TBILI: 1.3 (H)   ALB: 3.0 (L) TOT PROTEIN: 7.2 LIPASE: 23     INR:  1.25 (H) PTT:  36   D-dimer:  N/A Fibrinogen:  N/A       Imaging results reviewed over the past 24 hrs:   Recent Results (from the past 24 hours)   CT Abdomen Pelvis w Contrast    Narrative    CT ABDOMEN PELVIS W CONTRAST 10/24/2024 11:24 AM    CLINICAL HISTORY: diffuse pain, anterior trauma last week, h/o CA    TECHNIQUE: CT scan of the abdomen and pelvis was performed following  injection of IV contrast. Multiplanar reformats were obtained. Dose  reduction techniques were used.  CONTRAST: 80mL Isovue-370    COMPARISON: CT abdomen and pelvis performed on 10/20/2023, 1/29/2024    FINDINGS:   LOWER CHEST: Subpleural fibrotic changes are again seen along the lung  bases with superimposed subsegmental atelectasis.    HEPATOBILIARY: Diffuse hepatic steatosis is present with nodular  contour compatible with cirrhosis. Enlarging mass is seen within the  right hepatic lobe measuring 2.2 cm compared with the prior  measurement of approximately 1.2 cm (series 3, image 23). Another 2.7  cm hypoattenuating lesion along the anterior right hepatic lobe  previously measured 1.7 cm (series 3, image 25) and may reflect an  ablation cavity from recent microwave ablation. 2.2 x 1.9 cm  ill-defined mass in the left hepatic lobe appears more heterogenous  and decreased from the prior  measurement of 2.6 x 2.5 cm (series 3,  image 21). Multiple new subcapsular fluid collections are seen along  the anterior surface of the liver. The largest collection measures up  to 5.5 x 2.8 cm along the anterior left hepatic lobe (series 3, image  20). There are at least 4 other collections seen measuring 5.2 x 2.6  cm, 2.9 x 2.3 cm, 3.3 x 3.5 cm and 4.0 x 1.1 cm (series 3, images 11,  14, 22 and 29). Moderate surrounding fat stranding and soft tissue  thickening is also seen. Air and gas containing collection measuring  3.3 x 1.5 cm is noted along the inferior right hepatic lobe adjacent  to the inflamed hepatic flexure of the colon described below (series  5, image 42). Gallbladder appears mildly distended measuring up to 3.5  cm in diameter with wall thickening and surrounding fluid.    PANCREAS: No significant mass, duct dilatation, or inflammatory  change.    SPLEEN: Normal size.    ADRENAL GLANDS: No significant nodules.    KIDNEYS/BLADDER: No hydronephrosis is present. Stable subcentimeter  hypodense lesions on both kidneys which are too small to characterize  and favored to reflect small cysts.    BOWEL: Colonic diverticulosis without evidence of diverticulitis.  Segmental wall thickening is seen along the ascending and proximal  transverse colon at the hepatic flexure with mild surrounding fat  stranding (series 3, image 32). No evidence of bowel obstruction is  present.    PELVIC ORGANS: Dystrophic calcification is seen in the prostate gland.  Stable small fat-containing right inguinal hernia. Previously noted  hyperdense focus within the right posterior peripheral zone of the  prostate gland appears less conspicuous.    ADDITIONAL FINDINGS: Severe vascular calcifications are seen in the  abdominal aorta and iliac branches.    MUSCULOSKELETAL: Multilevel degenerative changes are present in the  spine. Remote fracture seen along several right posterior lower ribs  as well as the right L1 transverse  process.      Impression    IMPRESSION:   1.  Cirrhotic appearance of the liver as described above. Multiple  subcapsular fluid collections are seen along the anterior portion of  the liver with moderate surrounding inflammatory changes. Findings may  reflect posttraumatic collections with hemorrhage given history of  recent trauma. Abscess collections are also in the differential  diagnosis. At least one collection along the inferior right hepatic  lobe has internal locule of gas as well as peripheral enhancement  suspicious for an abscess.  2.  Multiple liver lesions are present including an enlarging 2.2 cm  mass in the right hepatic lobe suspicious for malignancy.  Characterization of these lesions is limited due to single phase exam  however. A 2.2 cm lesion in the left hepatic lobe appears more  heterogenous and decreased in size from the prior exam. Furthermore,  another 2.7 cm hypoattenuating lesion in the right hepatic lobe  appears increased in size but more fluidlike in appearance. Both of  these lesions may reflect ablation cavities from recent microwave  ablation for known hepatocellular carcinoma. Recommend further  characterization of these findings with follow-up MRI liver protocol  exam.  3.  Mild gallbladder wall thickening and pericholecystic fluid.  Findings may be reactive from adjacent inflammation along the liver  described above. Cholecystitis however cannot be excluded. Suggest  correlation with laboratory values and consider follow-up abdominal  ultrasound.  4.  Segmental wall thickening is seen along the ascending and proximal  transverse colon at the hepatic flexure. Findings are suggestive of  colitis which may be reactive from adjacent inflammation along the  liver. Infectious versus infiltrate colitis cannot be excluded. An  underlying colonic mass is considered to be less likely but cannot be  excluded given the focal area of wall thickening. If further concern,  follow-up colonoscopy  can be considered after acute symptoms resolve.    MONICA KHAN MD         SYSTEM ID:  [id number]

## 2024-10-24 NOTE — ED TRIAGE NOTES
Pt reports a week ago labs at PCP, elevated WBC, abdominal pain, weight loss, fall a week ago. Denies hitting head for fall, denies blood thinners, denies N/V     Triage Assessment (Adult)       Row Name 10/24/24 0950          Triage Assessment    Airway WDL WDL        Cognitive/Neuro/Behavioral WDL    Cognitive/Neuro/Behavioral WDL WDL

## 2024-10-24 NOTE — CONSULTS
[facility]    Infectious Disease Consultation     Date of Admission:  10/24/2024  Date of Consult : 10/24/24    Assessment:  73YM with hx of hepatic cirrhosis complicated by hepatocellular carcinoma- receiving microwave therapy-last session 3 months ago, who has been hospitalized with abdominal pain following a fall on 10/16, and  Ct findings of multiple  subcapsular fluid collections which seem to be consistent with hematomas given drop in Hb from 11.8 (on 10/16) to 9.3g.dl on admission    -S/p fall on 10/16 with abdominal pain related to multiple subcapsular hematomas  -Acute blood loss anemia related to above  -Leukocytosis is likely reactive   -Possible colitis on imaging, likely reactive. No clinical symptoms of infectious colitis  -Hepatocellular carcinoma, receiving microwave therapy  -Liver cirrhosis with mildly elevated liver enzymes  -Hyponatremia  -Parkinson's disease  -diabetes  -Chronic medical conditions - Htn, chronic anemia, hx of prostate cancer    Recommendations:  Based upon history , suspect subcapsular fluid collections are consistent with hematomas. Patient has acute blood loss anemia as well which is in favor of intra abdominal bleeding.  Discontinue antibiotics and monitor clinically  Follow blood cx, clinical status and labs  Short interval follow up imaging, and if there is ongoing concern for infection, will need IR aspiration/drain placement followed by prolonged antibiotic therapy    Recommendations were discussed with the hospitalist service  Mabel Holland MD    Reason for Consult   Reason for consult: I was asked to evaluate this patient for abdominal abscesses.    Primary Care Physician   DELANEY FAM    Chief Complaint   Abdominal pain    History is obtained from the patient and medical records    History of Present Illness   Lamine Samano is a 73 year old male with hx of  alcoholic cirrhosis complicated by hepatocellular carcinoma- receiving microwave  therapy-last session 3 months ago, who has been hospitalized with abdominal pain following a fall on 10/16, and  Ct findings of multiple  subcapsular fluid collections which seem to be consistent with hematomas given drop in Hb from 11.8 (on 10/16) to 9.3g.dl on admission    He continues to drink alcohol but is trying to cut down. Currently drinks about 2 drinks per day. Denies any prior history of withdrawal symptoms     He denies fever, chills or sweats, currently not on any chemotherapy, he has had worsening abdominal pain, denies nausea, vomiting or diarrhea.    Past Medical History   I have reviewed this patient's medical history and updated it with pertinent information if needed.   Past Medical History:   Diagnosis Date    Parkinson disease (H)        Past Surgical History   I have reviewed this patient's surgical history and updated it with pertinent information if needed.  Past Surgical History:   Procedure Laterality Date    REMOVAL OF SPERM DUCT(S)      Description: Surgery Of Male Genitalia Vasectomy;  Recorded: 06/07/2009;       Prior to Admission Medications   Prior to Admission Medications   Prescriptions Last Dose Informant Patient Reported? Taking?   atenolol (TENORMIN) 25 MG tablet   Yes No   Sig: Take 1 tablet by mouth daily   carbidopa-levodopa (SINEMET)  MG tablet   Yes No   Sig: Take 1 tablet by mouth 2 times daily   carbidopa-levodopa (SINEMET)  MG tablet   Yes No   Sig: Take 1 tablet by mouth daily as needed (in the afternoon if needed)   cholecalciferol, vitamin D3, (VITAMIN D3) 2,000 unit cap   Yes No   Sig: Take 2,000 Units by mouth daily   cyanocobalamin (VITAMIN B-12) 1000 MCG tablet   No No   Sig: Take 1 tablet (1,000 mcg) by mouth daily   doxepin (SINEQUAN) 10 MG capsule   Yes No   Sig: Take 10 mg by mouth at bedtime   folic acid (FOLVITE) 1 MG tablet   Yes No   Sig: Take 1 mg by mouth daily   hydrOXYzine (ATARAX) 50 MG tablet   Yes No   Sig: Take 50 mg by mouth at bedtime    ibuprofen (ADVIL/MOTRIN) 200 MG tablet   Yes No   Sig: Take 200 mg by mouth every 8 hours as needed for pain   lisinopril (ZESTRIL) 20 MG tablet   Yes No   Sig: Take 1 tablet by mouth daily   mirtazapine (REMERON) 45 MG tablet   No No   Sig: [MIRTAZAPINE (REMERON) 45 MG TABLET] TAKE ONE TABLET BY MOUTH EVERY NIGHT AT BEDTIME   mupirocin (BACTROBAN) 2 % external ointment   Yes No   Sig: Apply topically daily Apply to wound until resolved   omeprazole (PRILOSEC) 20 MG DR capsule   Yes No   Sig: Take 20 mg by mouth daily   potassium gluconate 550 mg (90 mg) tablet   Yes No   Sig: Take 90 mg by mouth daily   rosuvastatin (CRESTOR) 5 MG tablet   Yes No   Sig: Take 5 mg by mouth every morning   traZODone (DESYREL) 100 MG tablet   No No   Sig: [TRAZODONE (DESYREL) 100 MG TABLET] TAKE 2 TABLETS BY MOUTH EVERY NIGHT AT BEDTIME      Facility-Administered Medications: None     Allergies   Allergies   Allergen Reactions    Levofloxacin Swelling     Ruptured both achilles tendons     Penicillins Hives       Immunization History   Immunization History   Administered Date(s) Administered    COVID-19 12+ (MODERNA) 10/12/2023, 10/16/2024    COVID-19 Bivalent 18+ (Moderna) 10/04/2022    COVID-19 Monovalent 18+ (Moderna) 01/16/2021, 02/13/2021, 11/10/2021, 04/11/2022    Flu, Unspecified 10/12/2007, 10/14/2008    Influenza (High Dose) Trivalent,PF (Fluzone) 09/25/2015, 11/18/2016    Influenza Vaccine, 6+MO IM (QUADRIVALENT W/PRESERVATIVES) 09/14/2010, 10/06/2011, 10/25/2013, 10/30/2014    Pneumo Conj 13-V (2010&after) 09/25/2015    Pneumococcal 23 valent 11/18/2016    TDAP (Adacel,Boostrix) 11/11/2008    Td,adult,historic,unspecified 11/11/2008    Zoster vaccine, live 11/01/2011       Social History   I have reviewed this patient's social history and updated it with pertinent information if needed. Lamine Samano  reports that he has quit smoking. He does not have any smokeless tobacco history on file.    Family History   I have  reviewed this patient's family history and updated it with pertinent information if needed.   Family History   Problem Relation Age of Onset    Diabetes Type 2  Brother     Diabetes Brother        Review of Systems   The 10 point Review of Systems is as per HPI    Physical Exam   Temp: 98.9  F (37.2  C) Temp src: Oral BP: (!) 139/94 Pulse: 112   Resp: 18 SpO2: 90 % O2 Device: None (Room air)    Vital Signs with Ranges  Temp:  [98.3  F (36.8  C)-98.9  F (37.2  C)] 98.9  F (37.2  C)  Pulse:  [111-112] 112  Resp:  [16-18] 18  BP: (139-143)/(72-94) 139/94  SpO2:  [90 %-96 %] 90 %  159 lbs 0 oz  Body mass index is 23.48 kg/m .    GENERAL APPEARANCE:  awake, chronically ill appearing male  EYES: Eyes grossly normal to inspection  NECK: no adenopathy  RESP: lungs clear   CV: S1S2, irregular  ABDOMEN: epigastric and RUQ tenderness  SKIN: no suspicious lesions or rashes      Data   All laboratory data reviewed  Component      Latest Ref Rn 10/24/2024  10:21 AM   WBC      4.0 - 11.0 10e3/uL 17.4 (H)    RBC Count      4.40 - 5.90 10e6/uL 3.23 (L)    Hemoglobin      13.3 - 17.7 g/dL 9.9 (L)    Hematocrit      40.0 - 53.0 % 29.7 (L)    MCV      78 - 100 fL 92    MCH      26.5 - 33.0 pg 30.7    MCHC      31.5 - 36.5 g/dL 33.3    RDW      10.0 - 15.0 % 14.4    Platelet Count      150 - 450 10e3/uL 367       Component      Latest Ref Rng 10/24/2024  10:21 AM   Sodium      135 - 145 mmol/L 131 (L)    Potassium      3.4 - 5.3 mmol/L 3.9    Carbon Dioxide (CO2)      22 - 29 mmol/L 23    Anion Gap      7 - 15 mmol/L 16 (H)    Urea Nitrogen      8.0 - 23.0 mg/dL 15.3    Creatinine      0.67 - 1.17 mg/dL 0.85    GFR Estimate      >60 mL/min/1.73m2 >90    Calcium      8.8 - 10.4 mg/dL 8.4 (L)    Chloride      98 - 107 mmol/L 92 (L)    Glucose      70 - 99 mg/dL 125 (H)    Alkaline Phosphatase      40 - 150 U/L 168 (H)    AST      0 - 45 U/L 51 (H)    ALT      0 - 70 U/L 16    Protein Total      6.4 - 8.3 g/dL 7.2    Albumin      3.5 -  5.2 g/dL 3.0 (L)    Bilirubin Total      <=1.2 mg/dL 1.3 (H)       Microbiology  0/24/2024 1303 10/24/2024 1307 Blood Culture Peripheral Blood [[id number]]   Peripheral Blood    In process Component Value   No component results        Imaging  CT ABDOMEN PELVIS W CONTRAST 10/24/2024 11:24 AM     CLINICAL HISTORY: diffuse pain, anterior trauma last week, h/o CA     TECHNIQUE: CT scan of the abdomen and pelvis was performed following  injection of IV contrast. Multiplanar reformats were obtained. Dose  reduction techniques were used.  CONTRAST: 80mL Isovue-370     COMPARISON: CT abdomen and pelvis performed on 10/20/2023, 1/29/2024     FINDINGS:   LOWER CHEST: Subpleural fibrotic changes are again seen along the lung  bases with superimposed subsegmental atelectasis.     HEPATOBILIARY: Diffuse hepatic steatosis is present with nodular  contour compatible with cirrhosis. Enlarging mass is seen within the  right hepatic lobe measuring 2.2 cm compared with the prior  measurement of approximately 1.2 cm (series 3, image 23). Another 2.7  cm hypoattenuating lesion along the anterior right hepatic lobe  previously measured 1.7 cm (series 3, image 25) and may reflect an  ablation cavity from recent microwave ablation. 2.2 x 1.9 cm  ill-defined mass in the left hepatic lobe appears more heterogenous  and decreased from the prior measurement of 2.6 x 2.5 cm (series 3,  image 21). Multiple new subcapsular fluid collections are seen along  the anterior surface of the liver. The largest collection measures up  to 5.5 x 2.8 cm along the anterior left hepatic lobe (series 3, image  20). There are at least 4 other collections seen measuring 5.2 x 2.6  cm, 2.9 x 2.3 cm, 3.3 x 3.5 cm and 4.0 x 1.1 cm (series 3, images 11,  14, 22 and 29). Moderate surrounding fat stranding and soft tissue  thickening is also seen. Air and gas containing collection measuring  3.3 x 1.5 cm is noted along the inferior right hepatic lobe adjacent  to  the inflamed hepatic flexure of the colon described below (series  5, image 42). Gallbladder appears mildly distended measuring up to 3.5  cm in diameter with wall thickening and surrounding fluid.     PANCREAS: No significant mass, duct dilatation, or inflammatory  change.     SPLEEN: Normal size.     ADRENAL GLANDS: No significant nodules.     KIDNEYS/BLADDER: No hydronephrosis is present. Stable subcentimeter  hypodense lesions on both kidneys which are too small to characterize  and favored to reflect small cysts.     BOWEL: Colonic diverticulosis without evidence of diverticulitis.  Segmental wall thickening is seen along the ascending and proximal  transverse colon at the hepatic flexure with mild surrounding fat  stranding (series 3, image 32). No evidence of bowel obstruction is  present.     PELVIC ORGANS: Dystrophic calcification is seen in the prostate gland.  Stable small fat-containing right inguinal hernia. Previously noted  hyperdense focus within the right posterior peripheral zone of the  prostate gland appears less conspicuous.     ADDITIONAL FINDINGS: Severe vascular calcifications are seen in the  abdominal aorta and iliac branches.     MUSCULOSKELETAL: Multilevel degenerative changes are present in the  spine. Remote fracture seen along several right posterior lower ribs  as well as the right L1 transverse process.                                                                      IMPRESSION:   1.  Cirrhotic appearance of the liver as described above. Multiple  subcapsular fluid collections are seen along the anterior portion of  the liver with moderate surrounding inflammatory changes. Findings may  reflect posttraumatic collections with hemorrhage given history of  recent trauma. Abscess collections are also in the differential  diagnosis. At least one collection along the inferior right hepatic  lobe has internal locule of gas as well as peripheral enhancement  suspicious for an  abscess.  2.  Multiple liver lesions are present including an enlarging 2.2 cm  mass in the right hepatic lobe suspicious for malignancy.  Characterization of these lesions is limited due to single phase exam  however. A 2.2 cm lesion in the left hepatic lobe appears more  heterogenous and decreased in size from the prior exam. Furthermore,  another 2.7 cm hypoattenuating lesion in the right hepatic lobe  appears increased in size but more fluidlike in appearance. Both of  these lesions may reflect ablation cavities from recent microwave  ablation for known hepatocellular carcinoma. Recommend further  characterization of these findings with follow-up MRI liver protocol  exam.  3.  Mild gallbladder wall thickening and pericholecystic fluid.  Findings may be reactive from adjacent inflammation along the liver  described above. Cholecystitis however cannot be excluded. Suggest  correlation with laboratory values and consider follow-up abdominal  ultrasound.  4.  Segmental wall thickening is seen along the ascending and proximal  transverse colon at the hepatic flexure. Findings are suggestive of  colitis which may be reactive from adjacent inflammation along the  liver. Infectious versus infiltrate colitis cannot be excluded. An  underlying colonic mass is considered to be less likely but cannot be  excluded given the focal area of wall thickening. If further concern,  follow-up colonoscopy can be considered after acute symptoms resolve.

## 2024-10-24 NOTE — H&P
[facility]    History and Physical - Hospitalist Service       Date of Admission:  10/24/2024    Assessment & Plan      Lamine Samano is a 73 year old male admitted on 10/24/2024.   Lamine Samano is a 73 year old male with history of hepatocellular carcinoma currently getting microwave ablation about every 3 months, Parkinson's disease, alcohol use disorder, history of prostate cancer on androgen deprivation therapy, hypertension, type 2 diabetes mellitus, cirrhosis of liver who presents with generalized abdominal pain for about a week that started 24 hours after a fall.    Abdominal pain  Liver abscess versus  traumatic subcapsular hepatic hematoma  Possible cholecystitis  Possible colitis  Leukocytosis  -Pain started 24 hours after fall and abdominal trauma.  No history of fever, nausea vomiting or diarrhea  -CT abdomen pelvis-multiple subcapsular fluid collection with surrounding inflammatory changes, posttraumatic collection of hemorrhage versus abscess.  Multiple liver lesions, possible HCC.  Mild gallbladder wall thickening and pericholecystic fluid, could be reactive from adjacent inflammation along the liver, segmental wall thickening along the ascending and proximal transverse colon at the hepatic flexure, possible colitis which may be reactive from adjacent inflammation along the liver  -WBC count of 17  -Check coag panel-PTT/INR  -Started on cefepime(given penicillin allergy) and Flagyl in the ED after drawing blood cultures.  Will continue cefepime and Flagyl  -[facility] consulted  -Infectious disease consulted for antibiotic recommendation  -Pain control with p.o. oxycodone/IV Dilaudid  -Follow labs    Addend : Discussed with Dr. Mabel Holland from infectious disease, his liver lesions are more than likely hematoma and less likely infectious in etiology.  Plan for stopping antibiotics and observing for any obvious source of infection.  Appreciate input    Hepatocellular  carcinoma  History of prostate cancer  -Known history of HCC status post microwave ablation, supposed to get neck cycle of treatment November, and prostate cancer on androgen deprivation therapy, follows with Dr. Dreyer from [facility]  -Oncology consulted    Liver cirrhosis  History of alcohol use disorder  Hyperbilirubinemia, mild  Elevated transaminases, mild  -Reports still drinking about 2 drinks a day, has cut down from previously.  Never had alcohol withdrawal before  -Closely monitor with [facility], will hold off on benzodiazepine for now unless he starts exhibiting signs of withdrawal  -[facility] consulted as above  -Coag panels sent as above  -Monitor LFTs    Parkinson's disease  -Resume PTA medication once verified by pharmacy    Hyponatremia, mild  -Monitor  -Further workup if remains low or decreases further    Chronic anemia  -Appears like over the last year hemoglobin has been around 10-12  -About a year back when his hemoglobin was less than 7 his iron studies had suggested high iron level, normal B12 and folate  -Hemoglobin at presentation 9.9.  -Patient denies any active bleed.  But does have evidence of possible hematoma as above  -Monitor for further drop and if remains stable would recommend outpatient follow-up    Type 2 diabetes mellitus  -PTA on metformin, hold PTA metformin  -Hemoglobin A1c on 9/18/2024 was 6.3  -SSI with low resistance for now    Hypertension  -Resume PTA medication with holding parameters once verified by pharmacy          Diet:  Regular  DVT Prophylaxis: Pneumatic Compression Devices, hold pharmacological prophylaxis for possibility of hepatic hematoma  Diaz Catheter: Not present  Lines: None     Cardiac Monitoring: None  Code Status:  Full code, discussed with patient and his wife at bedside    Clinically Significant Risk Factors Present on Admission         # Hyponatremia: Lowest Na = 131 mmol/L in last 2 days, will monitor as appropriate  # Hypochloremia: Lowest Cl = 92  mmol/L in last 2 days, will monitor as appropriate      # Hypoalbuminemia: Lowest albumin = 3 g/dL at 10/24/2024 10:21 AM, will monitor as appropriate     # Hypertension: Noted on problem list    # Anemia: based on hgb <11                  Disposition Plan     Medically Ready for Discharge: Anticipated in 2-4 Days           Rhonda Farmer MD  Hospitalist Service  [facility]  Securely message with [employer] (more info)  Text page via [employer] Paging/Directory     ______________________________________________________________________    Chief Complaint   Abdominal pain following a fall    History is obtained from the patient, electronic health record, and emergency department physician    History of Present Illness   Lamine Samano is a 73 year old male with history of hepatocellular carcinoma currently getting microwave ablation about every 3 months, Parkinson's disease, alcohol use disorder, history of prostate cancer on androgen deprivation therapy, hypertension, type 2 diabetes mellitus, cirrhosis of liver who presents with generalized abdominal pain for about a week that started 24 hours after a fall.    Patient reports that about 9 days ago, while he went to the bathroom at night he had a mechanical fall and fell on his abdomen.  About 24 hours later he started having upper abdominal pain that that continued to progress in severity and location.  He reports any kind of mobility worsens his pain so presented to the ED.  He denies any nausea vomiting or fever.  He denies any changes in bowel habit.    He continues to drink alcohol but is trying to cut down.  Currently drinks about 2 drinks per day.  Denies any prior history of withdrawal symptoms.      Past Medical History    Past Medical History:   Diagnosis Date    Parkinson disease (H)        Past Surgical History   Past Surgical History:   Procedure Laterality Date    REMOVAL OF SPERM DUCT(S)      Description: Surgery Of Male Genitalia  Vasectomy;  Recorded: 06/07/2009;       Prior to Admission Medications   Prior to Admission Medications   Prescriptions Last Dose Informant Patient Reported? Taking?   atenolol (TENORMIN) 25 MG tablet   Yes No   Sig: Take 1 tablet by mouth daily   carbidopa-levodopa (SINEMET)  MG tablet   Yes No   Sig: Take 1 tablet by mouth 2 times daily   carbidopa-levodopa (SINEMET)  MG tablet   Yes No   Sig: Take 1 tablet by mouth daily as needed (in the afternoon if needed)   cholecalciferol, vitamin D3, (VITAMIN D3) 2,000 unit cap   Yes No   Sig: Take 2,000 Units by mouth daily   cyanocobalamin (VITAMIN B-12) 1000 MCG tablet   No No   Sig: Take 1 tablet (1,000 mcg) by mouth daily   doxepin (SINEQUAN) 10 MG capsule   Yes No   Sig: Take 10 mg by mouth at bedtime   folic acid (FOLVITE) 1 MG tablet   Yes No   Sig: Take 1 mg by mouth daily   hydrOXYzine (ATARAX) 50 MG tablet   Yes No   Sig: Take 50 mg by mouth at bedtime   ibuprofen (ADVIL/MOTRIN) 200 MG tablet   Yes No   Sig: Take 200 mg by mouth every 8 hours as needed for pain   lisinopril (ZESTRIL) 20 MG tablet   Yes No   Sig: Take 1 tablet by mouth daily   mirtazapine (REMERON) 45 MG tablet   No No   Sig: [MIRTAZAPINE (REMERON) 45 MG TABLET] TAKE ONE TABLET BY MOUTH EVERY NIGHT AT BEDTIME   mupirocin (BACTROBAN) 2 % external ointment   Yes No   Sig: Apply topically daily Apply to wound until resolved   omeprazole (PRILOSEC) 20 MG DR capsule   Yes No   Sig: Take 20 mg by mouth daily   potassium gluconate 550 mg (90 mg) tablet   Yes No   Sig: Take 90 mg by mouth daily   rosuvastatin (CRESTOR) 5 MG tablet   Yes No   Sig: Take 5 mg by mouth every morning   traZODone (DESYREL) 100 MG tablet   No No   Sig: [TRAZODONE (DESYREL) 100 MG TABLET] TAKE 2 TABLETS BY MOUTH EVERY NIGHT AT BEDTIME      Facility-Administered Medications: None        Review of Systems    The 10 point Review of Systems is negative other than noted in the HPI or here.      Physical Exam   Vital Signs:  Temp: 98.3  F (36.8  C) Temp src: Temporal BP: (!) 143/72 Pulse: 111   Resp: 16 SpO2: 96 % O2 Device: None (Room air)    Weight: 159 lbs 0 oz    Exam:  Constitutional: Awake, alert and no distress. Appears comfortable  Head: Normocephalic. No masses, lesions, tenderness or abnormalities  ENMT: ENT exam normal, no neck nodes or sinus tenderness  Cardiovascular: RRR.  2+ murmurs, no rubs or JVD  Respiratory: Normal WOB,b/l equal air entry, no wheezes or crackles   Gastrointestinal: Abdomen soft but tender upper mid abdomen, negative for Gooden sign and no evidence of peritonitis. BS normal. No masses, organomegaly  : Deferred  Extremities : No edema , no clubbing or cyanosis    Neurologic: Cranial nerves II-XII grossly intact , power symmetrical, Reflexes normal and symmetric. Sensation grossly WNL.     Medical Decision Making       82 MINUTES SPENT BY ME on the date of service doing chart review, history, exam, documentation & further activities per the note.      Data     I have personally reviewed the following data over the past 24 hrs:    17.4 (H)  \   9.9 (L)   / 367     131 (L) 92 (L) 15.3 /  125 (H)   3.9 23 0.85 \     ALT: 16 AST: 51 (H) AP: 168 (H) TBILI: 1.3 (H)   ALB: 3.0 (L) TOT PROTEIN: 7.2 LIPASE: 23       Imaging results reviewed over the past 24 hrs:   Recent Results (from the past 24 hours)   CT Abdomen Pelvis w Contrast    Narrative    CT ABDOMEN PELVIS W CONTRAST 10/24/2024 11:24 AM    CLINICAL HISTORY: diffuse pain, anterior trauma last week, h/o CA    TECHNIQUE: CT scan of the abdomen and pelvis was performed following  injection of IV contrast. Multiplanar reformats were obtained. Dose  reduction techniques were used.  CONTRAST: 80mL Isovue-370    COMPARISON: CT abdomen and pelvis performed on 10/20/2023, 1/29/2024    FINDINGS:   LOWER CHEST: Subpleural fibrotic changes are again seen along the lung  bases with superimposed subsegmental atelectasis.    HEPATOBILIARY: Diffuse hepatic steatosis  is present with nodular  contour compatible with cirrhosis. Enlarging mass is seen within the  right hepatic lobe measuring 2.2 cm compared with the prior  measurement of approximately 1.2 cm (series 3, image 23). Another 2.7  cm hypoattenuating lesion along the anterior right hepatic lobe  previously measured 1.7 cm (series 3, image 25) and may reflect an  ablation cavity from recent microwave ablation. 2.2 x 1.9 cm  ill-defined mass in the left hepatic lobe appears more heterogenous  and decreased from the prior measurement of 2.6 x 2.5 cm (series 3,  image 21). Multiple new subcapsular fluid collections are seen along  the anterior surface of the liver. The largest collection measures up  to 5.5 x 2.8 cm along the anterior left hepatic lobe (series 3, image  20). There are at least 4 other collections seen measuring 5.2 x 2.6  cm, 2.9 x 2.3 cm, 3.3 x 3.5 cm and 4.0 x 1.1 cm (series 3, images 11,  14, 22 and 29). Moderate surrounding fat stranding and soft tissue  thickening is also seen. Air and gas containing collection measuring  3.3 x 1.5 cm is noted along the inferior right hepatic lobe adjacent  to the inflamed hepatic flexure of the colon described below (series  5, image 42). Gallbladder appears mildly distended measuring up to 3.5  cm in diameter with wall thickening and surrounding fluid.    PANCREAS: No significant mass, duct dilatation, or inflammatory  change.    SPLEEN: Normal size.    ADRENAL GLANDS: No significant nodules.    KIDNEYS/BLADDER: No hydronephrosis is present. Stable subcentimeter  hypodense lesions on both kidneys which are too small to characterize  and favored to reflect small cysts.    BOWEL: Colonic diverticulosis without evidence of diverticulitis.  Segmental wall thickening is seen along the ascending and proximal  transverse colon at the hepatic flexure with mild surrounding fat  stranding (series 3, image 32). No evidence of bowel obstruction is  present.    PELVIC ORGANS:  Dystrophic calcification is seen in the prostate gland.  Stable small fat-containing right inguinal hernia. Previously noted  hyperdense focus within the right posterior peripheral zone of the  prostate gland appears less conspicuous.    ADDITIONAL FINDINGS: Severe vascular calcifications are seen in the  abdominal aorta and iliac branches.    MUSCULOSKELETAL: Multilevel degenerative changes are present in the  spine. Remote fracture seen along several right posterior lower ribs  as well as the right L1 transverse process.      Impression    IMPRESSION:   1.  Cirrhotic appearance of the liver as described above. Multiple  subcapsular fluid collections are seen along the anterior portion of  the liver with moderate surrounding inflammatory changes. Findings may  reflect posttraumatic collections with hemorrhage given history of  recent trauma. Abscess collections are also in the differential  diagnosis. At least one collection along the inferior right hepatic  lobe has internal locule of gas as well as peripheral enhancement  suspicious for an abscess.  2.  Multiple liver lesions are present including an enlarging 2.2 cm  mass in the right hepatic lobe suspicious for malignancy.  Characterization of these lesions is limited due to single phase exam  however. A 2.2 cm lesion in the left hepatic lobe appears more  heterogenous and decreased in size from the prior exam. Furthermore,  another 2.7 cm hypoattenuating lesion in the right hepatic lobe  appears increased in size but more fluidlike in appearance. Both of  these lesions may reflect ablation cavities from recent microwave  ablation for known hepatocellular carcinoma. Recommend further  characterization of these findings with follow-up MRI liver protocol  exam.  3.  Mild gallbladder wall thickening and pericholecystic fluid.  Findings may be reactive from adjacent inflammation along the liver  described above. Cholecystitis however cannot be excluded.  Suggest  correlation with laboratory values and consider follow-up abdominal  ultrasound.  4.  Segmental wall thickening is seen along the ascending and proximal  transverse colon at the hepatic flexure. Findings are suggestive of  colitis which may be reactive from adjacent inflammation along the  liver. Infectious versus infiltrate colitis cannot be excluded. An  underlying colonic mass is considered to be less likely but cannot be  excluded given the focal area of wall thickening. If further concern,  follow-up colonoscopy can be considered after acute symptoms resolve.    MONICA KHAN MD         SYSTEM ID:  [id number]     Recent Labs   Lab 10/24/24  1021   WBC 17.4*   HGB 9.9*   MCV 92      *   POTASSIUM 3.9   CHLORIDE 92*   CO2 23   BUN 15.3   CR 0.85   ANIONGAP 16*   JORGE 8.4*   *   ALBUMIN 3.0*   PROTTOTAL 7.2   BILITOTAL 1.3*   ALKPHOS 168*   ALT 16   AST 51*   LIPASE 23

## 2024-10-25 ENCOUNTER — APPOINTMENT (OUTPATIENT)
Dept: OCCUPATIONAL THERAPY | Facility: CLINIC | Age: 74
DRG: 391 | End: 2024-10-25
Attending: INTERNAL MEDICINE
Payer: COMMERCIAL

## 2024-10-25 ENCOUNTER — APPOINTMENT (OUTPATIENT)
Dept: NUCLEAR MEDICINE | Facility: CLINIC | Age: 74
DRG: 391 | End: 2024-10-25
Attending: SURGERY
Payer: COMMERCIAL

## 2024-10-25 LAB
AFP SERPL-MCNC: 3.2 NG/ML
ALBUMIN SERPL BCG-MCNC: 2.7 G/DL (ref 3.5–5.2)
ALP SERPL-CCNC: 159 U/L (ref 40–150)
ALT SERPL W P-5'-P-CCNC: 5 U/L (ref 0–70)
ANION GAP SERPL CALCULATED.3IONS-SCNC: 10 MMOL/L (ref 7–15)
AST SERPL W P-5'-P-CCNC: 66 U/L (ref 0–45)
BILIRUB SERPL-MCNC: 1.2 MG/DL
BUN SERPL-MCNC: 11.7 MG/DL (ref 8–23)
CALCIUM SERPL-MCNC: 8.1 MG/DL (ref 8.8–10.4)
CHLORIDE SERPL-SCNC: 93 MMOL/L (ref 98–107)
CREAT SERPL-MCNC: 0.79 MG/DL (ref 0.67–1.17)
EGFRCR SERPLBLD CKD-EPI 2021: >90 ML/MIN/1.73M2
ERYTHROCYTE [DISTWIDTH] IN BLOOD BY AUTOMATED COUNT: 14.6 % (ref 10–15)
GLUCOSE BLDC GLUCOMTR-MCNC: 118 MG/DL (ref 70–99)
GLUCOSE BLDC GLUCOMTR-MCNC: 121 MG/DL (ref 70–99)
GLUCOSE BLDC GLUCOMTR-MCNC: 122 MG/DL (ref 70–99)
GLUCOSE BLDC GLUCOMTR-MCNC: 125 MG/DL (ref 70–99)
GLUCOSE SERPL-MCNC: 129 MG/DL (ref 70–99)
HCO3 SERPL-SCNC: 25 MMOL/L (ref 22–29)
HCT VFR BLD AUTO: 27.8 % (ref 40–53)
HGB BLD-MCNC: 9.3 G/DL (ref 13.3–17.7)
INR PPP: 1.2 (ref 0.85–1.15)
MCH RBC QN AUTO: 31 PG (ref 26.5–33)
MCHC RBC AUTO-ENTMCNC: 33.5 G/DL (ref 31.5–36.5)
MCV RBC AUTO: 93 FL (ref 78–100)
PLATELET # BLD AUTO: 355 10E3/UL (ref 150–450)
POTASSIUM SERPL-SCNC: 3.5 MMOL/L (ref 3.4–5.3)
PROT SERPL-MCNC: 6.3 G/DL (ref 6.4–8.3)
RBC # BLD AUTO: 3 10E6/UL (ref 4.4–5.9)
SODIUM SERPL-SCNC: 128 MMOL/L (ref 135–145)
WBC # BLD AUTO: 14.4 10E3/UL (ref 4–11)

## 2024-10-25 PROCEDURE — 99231 SBSQ HOSP IP/OBS SF/LOW 25: CPT | Performed by: SURGERY

## 2024-10-25 PROCEDURE — 36415 COLL VENOUS BLD VENIPUNCTURE: CPT | Performed by: INTERNAL MEDICINE

## 2024-10-25 PROCEDURE — 97535 SELF CARE MNGMENT TRAINING: CPT | Mod: GO

## 2024-10-25 PROCEDURE — 250N000011 HC RX IP 250 OP 636: Mod: JZ | Performed by: INTERNAL MEDICINE

## 2024-10-25 PROCEDURE — 78226 HEPATOBILIARY SYSTEM IMAGING: CPT

## 2024-10-25 PROCEDURE — 83550 IRON BINDING TEST: CPT | Performed by: INTERNAL MEDICINE

## 2024-10-25 PROCEDURE — 120N000001 HC R&B MED SURG/OB

## 2024-10-25 PROCEDURE — 85014 HEMATOCRIT: CPT | Performed by: INTERNAL MEDICINE

## 2024-10-25 PROCEDURE — 250N000013 HC RX MED GY IP 250 OP 250 PS 637: Performed by: INTERNAL MEDICINE

## 2024-10-25 PROCEDURE — 343N000001 HC RX 343 MED OP 636: Performed by: SURGERY

## 2024-10-25 PROCEDURE — 97165 OT EVAL LOW COMPLEX 30 MIN: CPT | Mod: GO

## 2024-10-25 PROCEDURE — 85045 AUTOMATED RETICULOCYTE COUNT: CPT | Performed by: INTERNAL MEDICINE

## 2024-10-25 PROCEDURE — 82728 ASSAY OF FERRITIN: CPT | Performed by: INTERNAL MEDICINE

## 2024-10-25 PROCEDURE — 85610 PROTHROMBIN TIME: CPT | Performed by: INTERNAL MEDICINE

## 2024-10-25 PROCEDURE — 82607 VITAMIN B-12: CPT | Performed by: INTERNAL MEDICINE

## 2024-10-25 PROCEDURE — 99233 SBSQ HOSP IP/OBS HIGH 50: CPT | Performed by: INTERNAL MEDICINE

## 2024-10-25 PROCEDURE — A9537 TC99M MEBROFENIN: HCPCS | Performed by: SURGERY

## 2024-10-25 PROCEDURE — 82947 ASSAY GLUCOSE BLOOD QUANT: CPT | Performed by: INTERNAL MEDICINE

## 2024-10-25 PROCEDURE — 99232 SBSQ HOSP IP/OBS MODERATE 35: CPT | Performed by: SPECIALIST

## 2024-10-25 RX ORDER — MORPHINE SULFATE 4 MG/ML
3 INJECTION, SOLUTION INTRAMUSCULAR; INTRAVENOUS ONCE
Status: DISCONTINUED | OUTPATIENT
Start: 2024-10-25 | End: 2024-10-28

## 2024-10-25 RX ORDER — KIT FOR THE PREPARATION OF TECHNETIUM TC 99M MEBROFENIN 45 MG/10ML
6 INJECTION, POWDER, LYOPHILIZED, FOR SOLUTION INTRAVENOUS ONCE
Status: COMPLETED | OUTPATIENT
Start: 2024-10-25 | End: 2024-10-25

## 2024-10-25 RX ADMIN — MEBROFENIN 7 MILLICURIE: 45 INJECTION, POWDER, LYOPHILIZED, FOR SOLUTION INTRAVENOUS at 10:52

## 2024-10-25 RX ADMIN — TRAZODONE HYDROCHLORIDE 100 MG: 100 TABLET ORAL at 20:31

## 2024-10-25 RX ADMIN — CARBIDOPA AND LEVODOPA 1 TABLET: 25; 250 TABLET ORAL at 09:50

## 2024-10-25 RX ADMIN — METRONIDAZOLE 500 MG: 500 INJECTION, SOLUTION INTRAVENOUS at 00:14

## 2024-10-25 RX ADMIN — CARBIDOPA AND LEVODOPA 1 TABLET: 25; 250 TABLET ORAL at 20:31

## 2024-10-25 RX ADMIN — DOCUSATE SODIUM 100 MG: 100 CAPSULE, LIQUID FILLED ORAL at 09:45

## 2024-10-25 RX ADMIN — DOCUSATE SODIUM 100 MG: 100 CAPSULE, LIQUID FILLED ORAL at 20:31

## 2024-10-25 ASSESSMENT — ACTIVITIES OF DAILY LIVING (ADL)
ADLS_ACUITY_SCORE: 0
PREVIOUS_RESPONSIBILITIES: MEAL PREP;HOUSEKEEPING;LAUNDRY;SHOPPING;YARDWORK;MEDICATION MANAGEMENT;FINANCES;DRIVING
ADLS_ACUITY_SCORE: 0

## 2024-10-25 NOTE — PROVIDER NOTIFICATION
MD Notification    Notified Person: MD byrd    Notified Person Name:    Notification Date/Time:    Notification Interaction:    Purpose of Notification: na 128    Orders Received:    Comments:

## 2024-10-25 NOTE — PROGRESS NOTES
Gen Surg Note:    Patient complains of being hungry this morning.    UOP: 300 ml + 3 voids    AF, VSS  Gen: NAD  Neuro: AAO  Abd: RUQ pain, distension    HIDA scan reveals chronic cholecystitis. Given his HCC and other co-morbidities, he is not a surgical candidate. I placed a consult for IR for cholecystostomy tube.    Shelly Garcia MD FACS  Trauma/Emergency/Critical Care Surgery

## 2024-10-25 NOTE — PROVIDER NOTIFICATION
MD Notification    Notified Person: MD Sanders    Notified Person Name:    Notification Date/Time:    Notification Interaction:    Purpose of Notification:    pt HTN, looks like was on meds, but i don't see on home med list. please clairfy. pt unsure.     STATUS    Orders Received:    Comments:

## 2024-10-25 NOTE — PROGRESS NOTES
"[facility]    Hospitalist Progress Note    Interval History   - Patient reports ongoing abdominal pain, no significant change from yesterday. Receiving pain control, so overall pain is much improved than PTA  - MRI results concerning for abscess--relayed to patient. Defer to ID re: further management    Assessment & Plan   Summary: Lamine Samano is a 73 year old male with PMH hepatocellular carcinoma on microwave ablation, Parkinson's disease, alcohol use disorder, history of prostate cancer on androgen deprivation therapy, hypertension, type 2 diabetes mellitus, cirrhosis of liver, who was admitted on 10/24/2024 with abdominal pain and liver lesions of unclear etiology.    Abdominal pain  Liver abscess versus  traumatic subcapsular hepatic hematoma  Possible cholecystitis  Possible colitis  Leukocytosis  Pain started 24 hours after fall 9 days PTA. No fever, nausea vomiting or diarrhea, note leukocytosis to 17k on admission. CT abdomen pelvis-multiple subcapsular fluid collection with surrounding inflammatory changes, posttraumatic collection of hemorrhage versus abscess.  Multiple liver lesions, possible HCC.  Mild gallbladder wall thickening and pericholecystic fluid, could be reactive from adjacent inflammation along the liver, segmental wall thickening along the ascending and proximal transverse colon at the hepatic flexure, possible colitis which may be reactive from adjacent inflammation along the liver. Follow up liver MRI shows hepatic metastases as well as a \"Subcapsular/subdiaphragmatic complex fluid collection along the anterior aspect left hepatic lobe extending to the third metastasis and left hepatic lobe inferiorly... This likely represents a subdiaphragmatic abscess.\"  - Cefepime and flagyl started in ED and stopped by ID  - Appreciate ID consult   - Abx, drainage management  - Appreciate Surgery consult   - HIDA scan   - Evaluate cholecystitis  - Pain control with p.o. " oxycodone/IV Dilaudid  Addendum: IR to place cholecystectomy tube and drain potential liver abscess, later today or tomorrow. They will place diet order if no procedures planned today    Hepatocellular carcinoma  History of prostate cancer  Known history of HCC status post microwave ablation, supposed to get neck cycle of treatment November, and prostate cancer on androgen deprivation therapy, follows with Dr. Dreyer from [facility]  - Oncology consult appreciated    Liver cirrhosis  History of alcohol use disorder  Hyperbilirubinemia, mild  Elevated transaminases, mild  Reports still drinking about 2 drinks a day, has cut down from previously.  Never had alcohol withdrawal before.   - Continue CIWA, no significant withdrawal noted today  - [facility] consulted    Parkinson's disease  - PTA Sinemet    Hyponatremia, mild  -Monitor  -Further workup if remains low or decreases further    Chronic anemia  -Appears like over the last year hemoglobin has been around 10-12  -About a year back when his hemoglobin was less than 7 his iron studies had suggested high iron level, normal B12 and folate  -Hemoglobin at presentation 9.9.  -Patient denies any active bleed.  But does have evidence of possible hematoma as above  -Monitor for further drop and if remains stable would recommend outpatient follow-up    Type 2 diabetes mellitus  - Hold PTA metformin  - Hemoglobin A1c on 9/18/2024 was 6.3  - SSI with low resistance for now    Hypertension  - Holding PTA atenolol    Clinically Significant Risk Factors Present on Admission         # Hyponatremia: Lowest Na = 131 mmol/L in last 2 days, will monitor as appropriate  # Hypochloremia: Lowest Cl = 92 mmol/L in last 2 days, will monitor as appropriate    # Hypomagnesemia: Lowest Mg = 1.2 mg/dL in last 2 days, will replace as needed   # Hypoalbuminemia: Lowest albumin = 3 g/dL at 10/24/2024 10:21 AM, will monitor as appropriate  # Coagulation Defect: INR = 1.25 (Ref range: 0.85 - 1.15)  and/or PTT = 36 Seconds (Ref range: 22 - 38 Seconds), will monitor for bleeding    # Hypertension: Noted on problem list    # Anemia: based on hgb <11                   PT/OT: ordered  Diet: Regular Diet Adult    DVT Prophylaxis: Pneumatic Compression Devices  Diaz Catheter: Not present  Lines: None     Cardiac Monitoring: None  Code Status: Full Code    Medically Ready for Discharge: Anticipated in 2-4 Days      Parveen Sanders MD  Hospitalist Service  [facility]  Securely message with Vocera (more info)  Text page via [employer] Paging/Directory     - Total time spent on encounter is 50 minutes, which includes counseling, coordination of care, time spent discussing with family, and/or time spent discussing with consultants.    Data reviewed today: I reviewed all new labs and imaging results over the last 24 hours.    Physical Exam   Temp: 98.6  F (37  C) Temp src: Oral BP: 137/77 Pulse: 82   Resp: 18 SpO2: 93 % O2 Device: None (Room air) Oxygen Delivery: 1 LPM  Vitals:    10/24/24 0912   Weight: 72.1 kg (159 lb)     Vital Signs with Ranges  Temp:  [98.2  F (36.8  C)-99.7  F (37.6  C)] 98.6  F (37  C)  Pulse:  [] 82  Resp:  [18-21] 18  BP: (119-145)/(73-94) 137/77  SpO2:  [88 %-93 %] 93 %  I/O last 3 completed shifts:  In: 360 [P.O.:360]  Out: 300 [Urine:300]  O2 requirements: none    Constitutional: Male in NAD, resting tremor  HEENT: Eyes nonicteric, oral mucosa moist  Cardiovascular: RRR, normal S1/2, no m/r/g  Respiratory: CTAB, no wheezing or crackles  Vascular: No LE pitting edema  GI: Normoactive bowel sounds, nontender, nondistended  Skin/Integumen: No rashes  Neuro/Psych: Appropriate affect and mood. A&Ox3, moves all extremities    Medications   Current Facility-Administered Medications   Medication Dose Route Frequency Provider Last Rate Last Admin     Current Facility-Administered Medications   Medication Dose Route Frequency Provider Last Rate Last Admin     carbidopa-levodopa (SINEMET)  MG per tablet 1 tablet  1 tablet Oral BID Rhonda Farmer MD   1 tablet at 10/24/24 2109    docusate sodium (COLACE) capsule 100 mg  100 mg Oral BID Rhonda Farmer MD   100 mg at 10/24/24 2108    folic acid (FOLVITE) tablet 1 mg  1 mg Oral Daily Rhonda Farmer MD        insulin aspart (NovoLOG) injection (RAPID ACTING)  1-7 Units Subcutaneous TID AC hRonda Farmer MD   1 Units at 10/24/24 1844    insulin aspart (NovoLOG) injection (RAPID ACTING)  1-5 Units Subcutaneous At Bedtime Rhonda Farmer MD        metroNIDAZOLE (FLAGYL) infusion 500 mg  500 mg Intravenous Q12H Rhonda Farmer MD   500 mg at 10/25/24 0014    multivitamin w/minerals (THERA-VIT-M) tablet 1 tablet  1 tablet Oral Daily Rhonda Farmer MD        sodium chloride (PF) 0.9% PF flush 3 mL  3 mL Intracatheter Q8H Rhonda Farmer MD        thiamine (B-1) tablet 100 mg  100 mg Oral Daily Rhonda Farmer MD           Data   Recent Labs   Lab 10/25/24  0812 10/25/24  0330 10/24/24  2209 10/24/24  1421 10/24/24  1401 10/24/24  1021   WBC  --   --   --   --   --  17.4*   HGB  --   --   --   --   --  9.9*   MCV  --   --   --   --   --  92   PLT  --   --   --   --   --  367   INR  --   --   --   --  1.25*  --    NA  --   --   --   --   --  131*   POTASSIUM  --   --   --   --   --  3.9   CHLORIDE  --   --   --   --   --  92*   CO2  --   --   --   --   --  23   BUN  --   --   --   --   --  15.3   CR  --   --   --   --   --  0.85   ANIONGAP  --   --   --   --   --  16*   JORGE  --   --   --   --   --  8.4*   * 118* 144*   < >  --  125*   ALBUMIN  --   --   --   --   --  3.0*   PROTTOTAL  --   --   --   --   --  7.2   BILITOTAL  --   --   --   --   --  1.3*   ALKPHOS  --   --   --   --   --  168*   ALT  --   --   --   --   --  16   AST  --   --   --   --   --  51*   LIPASE  --   --   --   --   --  23    < > = values in this interval not displayed.       Imaging:   Recent Results (from the past 24 hours)   CT Abdomen Pelvis w  Contrast    Narrative    CT ABDOMEN PELVIS W CONTRAST 10/24/2024 11:24 AM    CLINICAL HISTORY: diffuse pain, anterior trauma last week, h/o CA    TECHNIQUE: CT scan of the abdomen and pelvis was performed following  injection of IV contrast. Multiplanar reformats were obtained. Dose  reduction techniques were used.  CONTRAST: 80mL [id number]-370    COMPARISON: CT abdomen and pelvis performed on 10/20/2023, 1/29/2024    FINDINGS:   LOWER CHEST: Subpleural fibrotic changes are again seen along the lung  bases with superimposed subsegmental atelectasis.    HEPATOBILIARY: Diffuse hepatic steatosis is present with nodular  contour compatible with cirrhosis. Enlarging mass is seen within the  right hepatic lobe measuring 2.2 cm compared with the prior  measurement of approximately 1.2 cm (series 3, image 23). Another 2.7  cm hypoattenuating lesion along the anterior right hepatic lobe  previously measured 1.7 cm (series 3, image 25) and may reflect an  ablation cavity from recent microwave ablation. 2.2 x 1.9 cm  ill-defined mass in the left hepatic lobe appears more heterogenous  and decreased from the prior measurement of 2.6 x 2.5 cm (series 3,  image 21). Multiple new subcapsular fluid collections are seen along  the anterior surface of the liver. The largest collection measures up  to 5.5 x 2.8 cm along the anterior left hepatic lobe (series 3, image  20). There are at least 4 other collections seen measuring 5.2 x 2.6  cm, 2.9 x 2.3 cm, 3.3 x 3.5 cm and 4.0 x 1.1 cm (series 3, images 11,  14, 22 and 29). Moderate surrounding fat stranding and soft tissue  thickening is also seen. Air and gas containing collection measuring  3.3 x 1.5 cm is noted along the inferior right hepatic lobe adjacent  to the inflamed hepatic flexure of the colon described below (series  5, image 42). Gallbladder appears mildly distended measuring up to 3.5  cm in diameter with wall thickening and surrounding fluid.    PANCREAS: No significant  mass, duct dilatation, or inflammatory  change.    SPLEEN: Normal size.    ADRENAL GLANDS: No significant nodules.    KIDNEYS/BLADDER: No hydronephrosis is present. Stable subcentimeter  hypodense lesions on both kidneys which are too small to characterize  and favored to reflect small cysts.    BOWEL: Colonic diverticulosis without evidence of diverticulitis.  Segmental wall thickening is seen along the ascending and proximal  transverse colon at the hepatic flexure with mild surrounding fat  stranding (series 3, image 32). No evidence of bowel obstruction is  present.    PELVIC ORGANS: Dystrophic calcification is seen in the prostate gland.  Stable small fat-containing right inguinal hernia. Previously noted  hyperdense focus within the right posterior peripheral zone of the  prostate gland appears less conspicuous.    ADDITIONAL FINDINGS: Severe vascular calcifications are seen in the  abdominal aorta and iliac branches.    MUSCULOSKELETAL: Multilevel degenerative changes are present in the  spine. Remote fracture seen along several right posterior lower ribs  as well as the right L1 transverse process.      Impression    IMPRESSION:   1.  Cirrhotic appearance of the liver as described above. Multiple  subcapsular fluid collections are seen along the anterior portion of  the liver with moderate surrounding inflammatory changes. Findings may  reflect posttraumatic collections with hemorrhage given history of  recent trauma. Abscess collections are also in the differential  diagnosis. At least one collection along the inferior right hepatic  lobe has internal locule of gas as well as peripheral enhancement  suspicious for an abscess.  2.  Multiple liver lesions are present including an enlarging 2.2 cm  mass in the right hepatic lobe suspicious for malignancy.  Characterization of these lesions is limited due to single phase exam  however. A 2.2 cm lesion in the left hepatic lobe appears more  heterogenous and  decreased in size from the prior exam. Furthermore,  another 2.7 cm hypoattenuating lesion in the right hepatic lobe  appears increased in size but more fluidlike in appearance. Both of  these lesions may reflect ablation cavities from recent microwave  ablation for known hepatocellular carcinoma. Recommend further  characterization of these findings with follow-up MRI liver protocol  exam.  3.  Mild gallbladder wall thickening and pericholecystic fluid.  Findings may be reactive from adjacent inflammation along the liver  described above. Cholecystitis however cannot be excluded. Suggest  correlation with laboratory values and consider follow-up abdominal  ultrasound.  4.  Segmental wall thickening is seen along the ascending and proximal  transverse colon at the hepatic flexure. Findings are suggestive of  colitis which may be reactive from adjacent inflammation along the  liver. Infectious versus infiltrate colitis cannot be excluded. An  underlying colonic mass is considered to be less likely but cannot be  excluded given the focal area of wall thickening. If further concern,  follow-up colonoscopy can be considered after acute symptoms resolve.    MONICA KHAN MD         SYSTEM ID:  [id number]   MR Liver wo & w Contrast    Narrative    EXAM: MR LIVER W/O and W CONTRAST  LOCATION: [facility]  DATE: 10/24/2024    INDICATION: History of liver cancer, s p ablation, liver lesions, also recent trauma hematoma vs abscess  COMPARISON: CT 10/24/2024  TECHNIQUE: Routine MRI liver protocol including T1 in/out phase, diffusion, multiplane T2, and dynamic T1 with IV contrast.    CONTRAST: 7mL Gadavist    FINDINGS:     LIVER: Post ablative changes involving the 3 hepatic metastasis. This results in absence of T2 signal abnormality at the prior metastatic sites with precontrast imaging demonstrating varying degrees of T1 signal indicating internal residual blood   products from the procedure. No  evidence for significant enhancement involving the hepatic metastasis. 0.9 cm focus of enhancement involving the far superior aspect of the right hepatic lobe anteriorly (series 12, image 89). Subdiaphragmatic curvilinear   fluid collection over the anterior aspect of the left hepatic lobe extending down to the level of the postoperative change and measuring at maximal diameters of 5.4 x 2.2 x 4.8 cm (4/19 and 3/8). Adjacent inflammatory change in the subcapsular soft   tissues. No calcified gallstones or biliary dilatation. Mild diffuse fatty infiltration.    ADDITIONAL FINDINGS: Pancreas, spleen and both adrenal glands unremarkable. Symmetric renal enhancement. Bilateral renal cysts. Normal caliber aorta. No lymphadenopathy.      Impression    IMPRESSION:  1.  Post ablative changes of underlying 3 hepatic metastasis which demonstrate no significant T2 signal abnormality which is resolved with new T1 signal internally indicating hemorrhagic components and no significant enhancement.    2.  Subcapsular/subdiaphragmatic complex fluid collection along the anterior aspect left hepatic lobe extending to the third metastasis and left hepatic lobe inferiorly. Dimensions as detailed above. 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   Continued attention to this on follow-up is recommended.    3.  A 0.9 cm focus of enhancement involving the anterosuperior aspect of the right hepatic lobe in the extreme subdiaphragmatic space of the right hepatic lobe. Attention to this on follow-up is recommended to assess for possible hepatic metastasis at   this location.

## 2024-10-25 NOTE — PROGRESS NOTES
[facility]    Infectious Disease Progress Note    Date of Service : 10/25/2024     Assessment:  73YM with hx of hepatic cirrhosis complicated by hepatocellular carcinoma- receiving microwave therapy-last session 3 months ago, who has been hospitalized with abdominal pain following a fall on 10/16, and  Ct findings of multiple  subcapsular fluid collections which seem to be consistent with hematomas given drop in Hb from 11.8 (on 10/16) to 9.3g.dl on admission. MRI however shows Subcapsular/subdiaphragmatic complex fluid collection may be consistent with abscess     -S/p fall on 10/16 with abdominal pain related to multiple subcapsular hematomas  -Acute blood loss anemia related to above  -Leukocytosis is likely reactive   -Possible colitis on imaging, likely reactive. No clinical symptoms of infectious colitis  -Hepatocellular carcinoma, receiving microwave therapy  -Liver cirrhosis with mildly elevated liver enzymes  -Hyponatremia  -Parkinson's disease  -diabetes  -Chronic medical conditions - Htn, chronic anemia, hx of prostate cancer     Recommendations:  Please consult IR to aspirate/drainage of  subcapsular/subdiaphragmatic fluid collection which is concerning for abscess and send fluid for culture  HIDA scan is planned  With MRI now showing findings worrisome for abscess, treat with Ceftriaxone and PO Metronidazole once aspirate/drain has been placed  ID will continue to follow  Recommendations were discussed with the hospitalist service    Mabel Holland MD    Interval History   Has remained afebrile , no new complaints, off antibiotics now though MRI is suggestive of a subcapsular /subdiaphragmatic abscess. HIDA scan is planned today      Physical Exam   Temp: 98.6  F (37  C) Temp src: Oral BP: 137/77 Pulse: 82   Resp: 18 SpO2: 93 % O2 Device: None (Room air) Oxygen Delivery: 1 LPM  Vitals:    10/24/24 0912   Weight: 72.1 kg (159 lb)     Vital Signs with Ranges  Temp:  [98.2  F (36.8   C)-99.7  F (37.6  C)] 98.6  F (37  C)  Pulse:  [] 82  Resp:  [18-21] 18  BP: (119-145)/(73-94) 137/77  SpO2:  [88 %-93 %] 93 %    GENERAL APPEARANCE:  awake, chronically ill appearing male  EYES: Eyes grossly normal to inspection  RESP: lungs clear   CV: S1S2, irregular  ABDOMEN: epigastric and RUQ tenderness  SKIN: no suspicious lesions or rashes    Other:    Medications   Current Facility-Administered Medications   Medication Dose Route Frequency Provider Last Rate Last Admin     Current Facility-Administered Medications   Medication Dose Route Frequency Provider Last Rate Last Admin    carbidopa-levodopa (SINEMET)  MG per tablet 1 tablet  1 tablet Oral BID Rhonda Farmer MD   1 tablet at 10/25/24 0950    docusate sodium (COLACE) capsule 100 mg  100 mg Oral BID Rhonda Farmer MD   100 mg at 10/25/24 0945    folic acid (FOLVITE) tablet 1 mg  1 mg Oral Daily Rhonda Farmer MD        insulin aspart (NovoLOG) injection (RAPID ACTING)  1-7 Units Subcutaneous TID AC Rhonda Farmer MD   1 Units at 10/24/24 1844    insulin aspart (NovoLOG) injection (RAPID ACTING)  1-5 Units Subcutaneous At Bedtime Rhonda Farmer MD        multivitamin w/minerals (THERA-VIT-M) tablet 1 tablet  1 tablet Oral Daily Rhonda Farmer MD        sodium chloride (PF) 0.9% PF flush 3 mL  3 mL Intracatheter Q8H Rhonda Farmer MD        thiamine (B-1) tablet 100 mg  100 mg Oral Daily Rhonda Farmer MD           Data   All microbiology laboratory data reviewed.  Recent Labs   Lab Test 10/24/24  1021 10/21/23  1112 10/20/23  1801 10/20/23  0943   WBC 17.4*  --  6.4 4.8   HGB 9.9* 9.0* 9.3* 5.9*   HCT 29.7*  --  28.6* 19.2*   MCV 92  --  85 86     --  346 383     Recent Labs   Lab Test 10/24/24  1021 10/21/23  1112 10/20/23  0943   CR 0.85 0.63* 0.55*     Microbiology  10/24/2024 1303 10/25/2024 0331 Blood Culture Peripheral Blood [[id number]]   Peripheral Blood    Preliminary result Component Value   Culture No growth after 12  hours P          Imaging  EXAM: MR LIVER W/O and W CONTRAST  LOCATION: [facility]  DATE: 10/24/2024     INDICATION: History of liver cancer, s p ablation, liver lesions, also recent trauma hematoma vs abscess  COMPARISON: CT 10/24/2024  TECHNIQUE: Routine MRI liver protocol including T1 in/out phase, diffusion, multiplane T2, and dynamic T1 with IV contrast.    CONTRAST: 7mL Gadavist     FINDINGS:      LIVER: Post ablative changes involving the 3 hepatic metastasis. This results in absence of T2 signal abnormality at the prior metastatic sites with precontrast imaging demonstrating varying degrees of T1 signal indicating internal residual blood   products from the procedure. No evidence for significant enhancement involving the hepatic metastasis. 0.9 cm focus of enhancement involving the far superior aspect of the right hepatic lobe anteriorly (series 12, image 89). Subdiaphragmatic curvilinear   fluid collection over the anterior aspect of the left hepatic lobe extending down to the level of the postoperative change and measuring at maximal diameters of 5.4 x 2.2 x 4.8 cm (4/19 and 3/8). Adjacent inflammatory change in the subcapsular soft   tissues. No calcified gallstones or biliary dilatation. Mild diffuse fatty infiltration.     ADDITIONAL FINDINGS: Pancreas, spleen and both adrenal glands unremarkable. Symmetric renal enhancement. Bilateral renal cysts. Normal caliber aorta. No lymphadenopathy.                                                                      IMPRESSION:  1.  Post ablative changes of underlying 3 hepatic metastasis which demonstrate no significant T2 signal abnormality which is resolved with new T1 signal internally indicating hemorrhagic components and no significant enhancement.     2.  Subcapsular/subdiaphragmatic complex fluid collection along the anterior aspect left hepatic lobe extending to the third metastasis and left hepatic lobe inferiorly. Dimensions  as detailed above. 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   Continued attention to this on follow-up is recommended.     3.  A 0.9 cm focus of enhancement involving the anterosuperior aspect of the right hepatic lobe in the extreme subdiaphragmatic space of the right hepatic lobe. Attention to this on follow-up is recommended to assess for possible hepatic metastasis at   this location.

## 2024-10-25 NOTE — PROVIDER NOTIFICATION
MD Notification    Notified Person: MD    Notified Person Name:  Marhenrik     Notification Date/Time: 10/25/2024 at 0444    Notification Interaction: [employer] web messaging     Purpose of Notification:  SBAR 827 A.A.   Situation: 827 A.A. Pt's liver MRI results complete.    Orders Received:  MD aware  ID and GI to follow     Comments:

## 2024-10-25 NOTE — PROVIDER NOTIFICATION
MD Notification    Notified Person: MD hospitalist     Notified Person Name: Dr. Ryan     Notification Date/Time: 10/24/2024 at 2010    Notification Interaction: [employer] web messaging     Purpose of Notification:  SBAR 804 A.A. Situation: 804 A.A. Magnesium 1.2 at 1021. Background: has sodium chloride 0.9 % 1,000 mL with Infuvite Adult 10 mL, thiamine 100 mg, folic acid 1 mg infusion running currently Recommendation: would you like this replaced?     Orders Received:  mag sulfate 4 g in 100 mL sterile water infusion once ordered     Comments:

## 2024-10-25 NOTE — PROVIDER NOTIFICATION
MD Notification    Notified Person: MD hospitalist     Notified Person Name: Dr. Ryan     Notification Date/Time: 10/24/2024 at 2129    Notification Interaction: [employer] web messaging     Purpose of Notification:    804 A.A. Pt states mild fluttering/palpitations in chest. denies chest pain or pressure. denies shortness of breath. VSS on RA except tachycardia () and HTN (145/74). Slight irregularity heard with auscultation. Pt currently in MRI. CIWA of 3 d/t tremor from parkinsons. would you like an EKG?       Orders Received:  Update after MRI  No Mg recheck ordered in AM    Comments:   Updated MD: vitals after MRI stable. declined fluttering and palpitations after MRI. Mg infusion complete. would you like a recheck MG in the AM?

## 2024-10-25 NOTE — PLAN OF CARE
Goal Outcome Evaluation:                  Orientation: A&ox4  Aggression Stop Light: green  Activity: Ax1 gait belt and walker to BR  Diet/BS Checks: regular with blood sugar checks.   Tele:  none  IV Access/Drains: R & L PIV SL  Pain Management: reported 7/10 pain in abdomen, PRN oral dilaudid available, but not taken  Abnormal VS/Results: VSS on RA except HTN, improved today. Pt states he stopped all bp meds prior to admit.  Oxygen removed, 95% RA. Chilled off/on, warm blankets, afebrile.  Bowel/Bladder: continent of B/B. No BM x 2 days, refused intervention  Skin/Wounds: scattered bruising. Redness/rash to chest.   Consults: GI, ID, oncology, surgery  D/C Disposition: pending    Other Info:   -Hida scan completed, shows chronic cholecystitis-IR to determine draining and stents needed for tmr.   -NPO after [place]  -CIWA scored a 3 for chronic tremor.   -Discharge pending.   -flagyl changed to PO

## 2024-10-25 NOTE — PLAN OF CARE
[id number]    Orientation: A&ox4  Aggression Stop Light: green  Activity: Ax1 gait belt and walker  Diet/BS Checks: regular with blood sugar checks.   Tele:  none  IV Access/Drains: R & L PIV SL  Pain Management: reported 7/10 pain in abdomen, manage with PRN oral dilaudid x1  Abnormal VS/Results: VSS on 1L O2 NC except HTN and intermittent tachycardia  Bowel/Bladder: continent of B/B. No BM this shift.   Skin/Wounds: scattered bruising. Redness/rash to chest.   Consults: GI, ID, oncology, surgery  D/C Disposition: pending    Other Info:   A&Ox4. VSS on 1L NC [id number] HTN and intermittent tachycardia. Reported 7/10 pain in abdomen, managed with PRN Oral dilaudid x1. Liver MRI completed this shift, MD notified of results. CIWA scored a 3.  Mg 1.2, MD notified, see notification note, IV replacement given.  Ax1 gait belt and walker.  Continent B/B. No BM this shift. Regular diet with blood sugar checks. R &L PIV SL. Discharge pending.

## 2024-10-25 NOTE — PLAN OF CARE
Orientation: A&O x4  Aggression Stop Light: Green  Activity: A1 GBW  Diet/BS Checks: Regular diet, BG check ACHS  Tele:  N/A  IV Access/Drains: R PIV infusing vitamin bag @ 100 ml/hr  Pain Management: Denies pain this shift  Abnormal VS/Results: VSS on RA ex HTN and tachycardia  Bowel/Bladder: Continent of B/B, no BM this shift  Skin/Wounds: scattered bruising and scabs, rash to chest  Consults: GI, ID, Oncology  D/C Disposition: Pending  Other Info: Liver MRI pending. CIWA= 3 this shift

## 2024-10-25 NOTE — PROGRESS NOTES
"   10/25/24 5216   Appointment Info   Signing Clinician's Name / Credentials (OT) AZEEM Portillo   Student Supervision On-site supervision provided   Rehab Comments (OT) 2 units - 1 unit eval of low complex, 1 unit self-care   Living Environment   People in Home spouse   Current Living Arrangements other (see comments);apartment  (\"cooperative unit\" apartment style)   Home Accessibility no concerns  (stairs, but has elevator)   Transportation Anticipated public transportation  ([employer])   Living Environment Comments Pt reports apartment is accessible, stairs in complex but many elevators to use. Bathroom has walk in door slot into the bathtub/shower w/ grab bars, inside and out, raised toilet w/ grab bars on L side. Pt reports having a \"homemade\" shower chair available.   Self-Care   Usual Activity Tolerance moderate   Current Activity Tolerance fair   Regular Exercise No   Equipment Currently Used at Home walker, rolling;wheelchair, power;other (see comments)  (power scooter)   Fall history within last six months yes   Number of times patient has fallen within last six months 3   Activity/Exercise/Self-Care Comment Pt reports IND at baseline w/ all ADLs, wife available to assist as needed.   Instrumental Activities of Daily Living (IADL)   Previous Responsibilities meal prep;housekeeping;laundry;shopping;yardwork;medication management;finances;driving   IADL Comments Pt reports IND at baseline w/ all I/ADLs. Pt was driving, Wife is available to assist as needed. Pt reports they have been doing a lot of online shopping recently (groceries, essentials, etc.)   General Information   Onset of Illness/Injury or Date of Surgery 10/24/24   Referring Physician Parveen Sanders MD   Patient/Family Therapy Goal Statement (OT) return home   Additional Occupational Profile Info/Pertinent History of Current Problem Per chart: Pt. is a 73 year old male with PMH hepatocellular carcinoma on microwave ablation, Parkinson's " disease, alcohol use disorder, history of prostate cancer on androgen deprivation therapy, hypertension, type 2 diabetes mellitus, cirrhosis of liver, who was admitted on 10/24/2024 with abdominal pain and liver lesions of unclear etiology.   Existing Precautions/Restrictions fall   Left Lower Extremity (Weight-bearing Status) full weight-bearing (FWB)   Right Lower Extremity (Weight-bearing Status) full weight-bearing (FWB)   Cognitive Status Examination   Orientation Status orientation to person, place and time   Affect/Mental Status (Cognitive) WNL   Follows Commands WNL   Visual Perception   Visual Impairment/Limitations corrective lenses full-time   Sensory   Sensory Quick Adds UE sensation intact;bilateral LE   Sensory Comments Pt reports BLE tingling at baseline   Pain Assessment   Patient Currently in Pain Yes, see Vital Sign flowsheet  (5/10 pain in abdomen)   Range of Motion Comprehensive   General Range of Motion bilateral upper extremity ROM WFL   Strength Comprehensive (MMT)   General Manual Muscle Testing (MMT) Assessment no strength deficits identified   Coordination   Fine Motor Coordination Resting tremor noted, Pt reports this is baseline d/t history of PD, some difficulty w/ fine motor control noted   Bed Mobility   Bed Mobility supine-sit;sit-supine;scooting/bridging   Scooting/Bridging [place] (Bed Mobility) supervision   Supine-Sit [place] (Bed Mobility) supervision   Sit-Supine [place] (Bed Mobility) supervision   Assistive Device (Bed Mobility) bed rails   Transfers   Transfers sit-stand transfer;toilet transfer   Sit-Stand Transfer   Sit-Stand [place] (Transfers) contact guard   Assistive Device (Sit-Stand Transfers) walker, front-wheeled   Toilet Transfer   Type (Toilet Transfer) stand-sit;sit-stand   [place] Level (Toilet Transfer) contact guard   Assistive Device (Toilet Transfer) grab bars/safety frame;walker, front-wheeled   Balance   Balance Assessment standing  dynamic balance   Standing Balance: Dynamic supervision   Position/Device Used, Standing Balance walker, front-wheeled   Activities of Daily Living   BADL Assessment/Intervention lower body dressing;grooming;toileting   Lower Body Dressing Assessment/Training   Position (Lower Body Dressing) unsupported sitting   [place] Level (Lower Body Dressing) lower body dressing skills;doff;don;socks;supervision   Grooming Assessment/Training   Position (Grooming) sink side   [place] Level (Grooming) grooming skills;hair care, combing/brushing;oral care regimen;wash face, hands;supervision   Toileting   Position (Toileting) unsupported sitting   [place] Level (Toileting) adjust/manage clothing;supervision   Clinical Impression   Criteria for Skilled Therapeutic Interventions Met (OT) Yes, treatment indicated   OT Diagnosis decreased independence w/ functional activity   Influenced by the following impairments decreased activity tolerance, pain, weakness, decreased ROM   OT Problem List-Impairments impacting ADL problems related to;activity tolerance impaired;flexibility;range of motion (ROM);strength;pain;postural control   Assessment of Occupational Performance 3-5 Performance Deficits   Identified Performance Deficits dressing, bathing, home mgmt, functional mobility   Planned Therapy Interventions (OT) ADL retraining;IADL retraining;strengthening;transfer training;home program guidelines;progressive activity/exercise;risk factor education;bed mobility training   Clinical Decision Making Complexity (OT) problem focused assessment/low complexity   Risk & Benefits of therapy have been explained evaluation/treatment results reviewed;care plan/treatment goals reviewed;risks/benefits reviewed;current/potential barriers reviewed;patient;spouse/significant other   OT Total Evaluation Time   OT Eval, Low Complexity Minutes (17010) 8   OT Goals   Therapy Frequency (OT) Daily   OT Goals Hygiene/Grooming;Lower Body  Dressing;Toilet Transfer/Toileting;Home Management   OT: Hygiene/Grooming modified independent;using adaptive equipment;while standing   OT: Lower Body Dressing Modified independent;using adaptive equipment;including set-up/clothing retrieval   OT: Toilet Transfer/Toileting Modified independent;toilet transfer;cleaning and garment management;using adaptive equipment   OT: Home Management Modified independent;with light demand household tasks;using adaptive equipment;ambulatory level   Interventions   Interventions Quick Adds Self-Care/Home Management   Self-Care/Home Management   Self-Care/Home Mgmt/ADL, Compensatory, Meal Prep Minutes (95989) 15   Symptoms Noted During/After Treatment (Meal Preparation/Planning Training) fatigue;increased pain;shortness of breath   Treatment Detail/Skilled Intervention Pt supine in bed working w/ lab upon entry, wife present, agreeable to therapy. Pt reports 5/10 pain in abdomen. Pt sup>sit EOB w/ SBA using L bed rail w/ HOB elevated, cues from OTS for technique and hand placement. Pt scoots EOB w/ verbal cues, sit>stand using FWW, GB and CGA for safety. Pt ambulates to BR, cues for walker mgmt, stand>sit onto toilet using L grab bar to simulate home setup. Cues for hand placement and Marty d/t lack of controlled descent. OTS educates on proper technique, pt. receptive. Sit>stand w/ FWW, L grab bar, CGA, transition sinkside to complete 2 g/h tasks w/ setup and SBA. Pt completes w/o difficulty, requests to return to bed d/t fatigue. Transitions back to bed, stand>sit w/ SBA adn FWW, good hand placement noted. While seated EOB, pt. doffs/dons socks using figure-4 technique. D/t to swelling in abdomen, pt unable to reach to floor, OTS educates on AE (reacher and sock aide). While donning L sock, LOB noted, Marty from OTS for uprighting. Pt then sit>sup w/ SBA , repositions self using B bed rails. OTS educates on energy conservation, home safety strats, AE purchasing options and use, pt  and wife receptive. Pt in bed w/ alarm on, call light in reach at end of session. RN updated.   OT Discharge Planning   OT Plan FB dressing w/ AE, incrase standing activity tolerance for I/ADLS, toilet transfer   OT Discharge Recommendation (DC Rec) home with assist   OT Rationale for DC Rec Pt is currently functioning below baseline d/t pain, weakness, and decreased activity toelrance. These deficits impact safety and idependence w/ I/ADLs. Pt reports being IND w/ all I/ADLs at baseline, requiring SBA-Marty for mobility. Pt states wife able to assist as needed. Would beebnfit from skilled therapy services to increase strength, activity tolerance and safety. Anticipate once medically stable, pt. able to d/c home w/ assist for safe completion of I/ADLs.   OT Brief overview of current status SBA-Marty for mobility, SBA for g/h, Marty toilet transfer Goals of therapy will be to address safe mobility and make recs for d/c to next level of care. Pt and RN will continue to follow all falls risk precautions as documented by RN staff while hospitalized.   OT Equipment Needed at Discharge shower chair   Total Session Time   Timed Code Treatment Minutes 15   Total Session Time (sum of timed and untimed services) 23

## 2024-10-25 NOTE — PROGRESS NOTES
[facility] Progress Note     IMPRESSION:  Liver Lesion  History of HCC:    -patient does follow with [facility] as outpatient, they will be seeing patient here to determine if this is progression of malignancy     -liver MRI on 10/25:    IMPRESSION:  1.  Post ablative changes of underlying 3 hepatic metastasis which demonstrate no significant T2 signal abnormality which is resolved with new T1 signal internally indicating hemorrhagic components and no significant enhancement.     2.  Subcapsular/subdiaphragmatic complex fluid collection along the anterior aspect left hepatic lobe extending to the third metastasis and left hepatic lobe inferiorly. Dimensions as detailed above. 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   Continued attention to this on follow-up is recommended.     3.  A 0.9 cm focus of enhancement involving the anterosuperior aspect of the right hepatic lobe in the extreme subdiaphragmatic space of the right hepatic lobe. Attention to this on follow-up is recommended to assess for possible hepatic metastasis at   this location.    Subcapsular Fluid Collections along anterior surface of Liver:  -plan for IR for drainage collection, and culture     -MRI shows that abscess is worsening-antibiotics per ID      Cirrhotic appearance of Liver:  -history of heavy alcohol use, still drinking 2 drinks per week    -has not followed with a hepatic provider in the past, thus likely did not have continued screening for HCC     Cholecystitis:  -HIDA scans reveals chronic nature of cholecystitis    -not a surgical candidate for cholecystectomy, os will need IR for cholecystostomy tube     RECOMMENDATIONS:  -appreciate ID consultation for management of antibiotic therapy    -appreciate surgery consult-patient is not deemed a surgical candidate, IR has been consulted for gallbladder drain placement    -appreciate oncology reqs    -we will not formally see patient over the weekend, but will see on  "Monday 10/28.  I will also arrange for him to get connected with [facility] as outpatient        Approximately 25 minutes of total time was spent providing patient care, including patient evaluation, reviewing documentation/test results, and   Nolvia Queen NP   [facility]  [phone]  ________________________________________________________________________      SUBJECTIVE:    Hungry, denies pain other than when abdomen is palpated     OBJECTIVE:  /76   Pulse 93   Temp 98.2  F (36.8  C)   Resp 18   Ht 1.753 m (5' 9\")   Wt 72.1 kg (159 lb)   SpO2 96%   BMI 23.48 kg/m    Temp (24hrs), Av.8  F (37.1  C), Min:98.2  F (36.8  C), Max:99.7  F (37.6  C)    Patient Vitals for the past 72 hrs:   Weight   10/24/24 0912 72.1 kg (159 lb)       Intake/Output Summary (Last 24 hours) at 10/25/2024 1523  Last data filed at 10/25/2024 0800  Gross per 24 hour   Intake 360 ml   Output 475 ml   Net -115 ml        PHYSICAL EXAM  GEN: Alert, oriented x3, communicative and in NAD.      HRT: RRR  LUNGS: CTA  ABD:, ND, +BS, \pain with deep palpation to the RUQ  SKIN: Jaundice  Neuro:  grossly intact       LABS:  I have reviewed the patient's new clinical lab results:     Recent Labs   Lab Test 10/25/24  1440 10/24/24  1401 10/24/24  1021 10/21/23  1112 10/20/23  1801   WBC 14.4*  --  17.4*  --  6.4   HGB 9.3*  --  9.9* 9.0* 9.3*   MCV 93  --  92  --  85     --  367  --  346   INR  --  1.25*  --   --   --      Recent Labs   Lab Test 10/24/24  1021 10/21/23  1112 10/20/23  0943   POTASSIUM 3.9 3.6 3.7   CHLORIDE 92* 103 100   CO2 23 22 20*   BUN 15.3 8.9 9.4   CR 0.85 0.63* 0.55*   ANIONGAP 16* 13 16*     Recent Labs   Lab Test 10/24/24  1021 10/21/23  1112 10/20/23  0943   ALBUMIN 3.0* 3.2* 3.3*   BILITOTAL 1.3* 1.1 0.4   ALT 16 <5 <5   AST 51* 23 25   LIPASE 23  --   --          "

## 2024-10-25 NOTE — PROGRESS NOTES
Patient is on IR or US schedule Saturday 10/26/24 for a possible Gallbladder drain and/or aspiration of an anterior liver fluid collection.     Lamine Samano is a 73 year old male with PMH hepatocellular carcinoma on microwave ablation, Parkinson's disease, alcohol use disorder, history of prostate cancer on androgen deprivation therapy, hypertension, type 2 diabetes mellitus, cirrhosis of liver, who was admitted on 10/24/2024 with abdominal pain and liver lesions of unclear etiology.     Dr Zapata will evaluate further regarding Gallbladder drain placement for tomorrow.      -Labs WNL for procedure.    -Orders for NPO have been entered.   -Consent will be done prior to procedure.     [place] the IR department at [phone] for procedural related questions.     Discussed with IR Dr Zapata, Dr Sanders and Cyn BIRCH today.    Total time: 20 minutes     Thanks, Tina [facility] Interventional Radiology CNP ([phone]) (phone [phone])

## 2024-10-25 NOTE — PROGRESS NOTES
Chart Check:    Lenin is currently down at imaging. Dr. Dreyer will follow up with him later this afternoon.    Amador MCLAUGHLIN, CNP  [facility]  [phone] (office)

## 2024-10-26 ENCOUNTER — APPOINTMENT (OUTPATIENT)
Dept: INTERVENTIONAL RADIOLOGY/VASCULAR | Facility: CLINIC | Age: 74
DRG: 391 | End: 2024-10-26
Attending: STUDENT IN AN ORGANIZED HEALTH CARE EDUCATION/TRAINING PROGRAM
Payer: COMMERCIAL

## 2024-10-26 LAB
FERRITIN SERPL-MCNC: 892 NG/ML (ref 31–409)
GLUCOSE BLDC GLUCOMTR-MCNC: 109 MG/DL (ref 70–99)
GLUCOSE BLDC GLUCOMTR-MCNC: 114 MG/DL (ref 70–99)
GLUCOSE BLDC GLUCOMTR-MCNC: 122 MG/DL (ref 70–99)
GLUCOSE BLDC GLUCOMTR-MCNC: 125 MG/DL (ref 70–99)
IRON BINDING CAPACITY (ROCHE): 122 UG/DL (ref 240–430)
IRON SATN MFR SERPL: 23 % (ref 15–46)
IRON SERPL-MCNC: 28 UG/DL (ref 61–157)
RETICS # AUTO: 0.08 10E6/UL (ref 0.03–0.1)
RETICS/RBC NFR AUTO: 2.7 % (ref 0.5–2)
VIT B12 SERPL-MCNC: 3098 PG/ML (ref 232–1245)

## 2024-10-26 PROCEDURE — 87070 CULTURE OTHR SPECIMN AEROBIC: CPT | Performed by: STUDENT IN AN ORGANIZED HEALTH CARE EDUCATION/TRAINING PROGRAM

## 2024-10-26 PROCEDURE — 250N000009 HC RX 250: Performed by: NURSE PRACTITIONER

## 2024-10-26 PROCEDURE — C1769 GUIDE WIRE: HCPCS

## 2024-10-26 PROCEDURE — 250N000011 HC RX IP 250 OP 636: Performed by: INTERNAL MEDICINE

## 2024-10-26 PROCEDURE — 99232 SBSQ HOSP IP/OBS MODERATE 35: CPT | Performed by: INTERNAL MEDICINE

## 2024-10-26 PROCEDURE — 255N000002 HC RX 255 OP 636: Performed by: STUDENT IN AN ORGANIZED HEALTH CARE EDUCATION/TRAINING PROGRAM

## 2024-10-26 PROCEDURE — 99152 MOD SED SAME PHYS/QHP 5/>YRS: CPT

## 2024-10-26 PROCEDURE — 250N000013 HC RX MED GY IP 250 OP 250 PS 637: Performed by: INTERNAL MEDICINE

## 2024-10-26 PROCEDURE — 0D9W30Z DRAINAGE OF PERITONEUM WITH DRAINAGE DEVICE, PERCUTANEOUS APPROACH: ICD-10-PCS | Performed by: STUDENT IN AN ORGANIZED HEALTH CARE EDUCATION/TRAINING PROGRAM

## 2024-10-26 PROCEDURE — 87075 CULTR BACTERIA EXCEPT BLOOD: CPT | Performed by: STUDENT IN AN ORGANIZED HEALTH CARE EDUCATION/TRAINING PROGRAM

## 2024-10-26 PROCEDURE — C1729 CATH, DRAINAGE: HCPCS

## 2024-10-26 PROCEDURE — 49406 IMAGE CATH FLUID PERI/RETRO: CPT

## 2024-10-26 PROCEDURE — 87077 CULTURE AEROBIC IDENTIFY: CPT | Performed by: STUDENT IN AN ORGANIZED HEALTH CARE EDUCATION/TRAINING PROGRAM

## 2024-10-26 PROCEDURE — 120N000001 HC R&B MED SURG/OB

## 2024-10-26 PROCEDURE — 272N000500 HC NEEDLE CR2

## 2024-10-26 PROCEDURE — 250N000011 HC RX IP 250 OP 636: Performed by: NURSE PRACTITIONER

## 2024-10-26 RX ORDER — NALOXONE HYDROCHLORIDE 0.4 MG/ML
0.2 INJECTION, SOLUTION INTRAMUSCULAR; INTRAVENOUS; SUBCUTANEOUS
Status: DISCONTINUED | OUTPATIENT
Start: 2024-10-26 | End: 2024-10-26 | Stop reason: HOSPADM

## 2024-10-26 RX ORDER — CEFEPIME HYDROCHLORIDE 2 G/1
2 INJECTION, POWDER, FOR SOLUTION INTRAVENOUS EVERY 12 HOURS
Status: DISCONTINUED | OUTPATIENT
Start: 2024-10-26 | End: 2024-10-29 | Stop reason: ALTCHOICE

## 2024-10-26 RX ORDER — METRONIDAZOLE 500 MG/100ML
500 INJECTION, SOLUTION INTRAVENOUS EVERY 12 HOURS
Status: DISCONTINUED | OUTPATIENT
Start: 2024-10-26 | End: 2024-10-29 | Stop reason: ALTCHOICE

## 2024-10-26 RX ORDER — IOPAMIDOL 612 MG/ML
100 INJECTION, SOLUTION INTRAVASCULAR ONCE
Status: COMPLETED | OUTPATIENT
Start: 2024-10-26 | End: 2024-10-26

## 2024-10-26 RX ORDER — FENTANYL CITRATE 50 UG/ML
25-50 INJECTION, SOLUTION INTRAMUSCULAR; INTRAVENOUS EVERY 5 MIN PRN
Status: DISCONTINUED | OUTPATIENT
Start: 2024-10-26 | End: 2024-10-26 | Stop reason: HOSPADM

## 2024-10-26 RX ORDER — FLUMAZENIL 0.1 MG/ML
0.2 INJECTION, SOLUTION INTRAVENOUS
Status: DISCONTINUED | OUTPATIENT
Start: 2024-10-26 | End: 2024-10-26 | Stop reason: HOSPADM

## 2024-10-26 RX ORDER — NALOXONE HYDROCHLORIDE 0.4 MG/ML
0.4 INJECTION, SOLUTION INTRAMUSCULAR; INTRAVENOUS; SUBCUTANEOUS
Status: DISCONTINUED | OUTPATIENT
Start: 2024-10-26 | End: 2024-10-26 | Stop reason: HOSPADM

## 2024-10-26 RX ADMIN — CARBIDOPA AND LEVODOPA 1 TABLET: 25; 250 TABLET ORAL at 14:56

## 2024-10-26 RX ADMIN — CARBIDOPA AND LEVODOPA 1 TABLET: 25; 250 TABLET ORAL at 09:02

## 2024-10-26 RX ADMIN — DOCUSATE SODIUM 100 MG: 100 CAPSULE, LIQUID FILLED ORAL at 09:02

## 2024-10-26 RX ADMIN — CEFEPIME 2 G: 2 INJECTION, POWDER, FOR SOLUTION INTRAVENOUS at 13:32

## 2024-10-26 RX ADMIN — HYDROMORPHONE HYDROCHLORIDE 2 MG: 2 TABLET ORAL at 17:36

## 2024-10-26 RX ADMIN — METRONIDAZOLE 500 MG: 500 INJECTION, SOLUTION INTRAVENOUS at 14:39

## 2024-10-26 RX ADMIN — HYDROMORPHONE HYDROCHLORIDE 0.4 MG: 0.2 INJECTION, SOLUTION INTRAMUSCULAR; INTRAVENOUS; SUBCUTANEOUS at 14:55

## 2024-10-26 RX ADMIN — LIDOCAINE HYDROCHLORIDE 10 ML: 10 INJECTION, SOLUTION INFILTRATION; PERINEURAL at 11:56

## 2024-10-26 RX ADMIN — HYDROMORPHONE HYDROCHLORIDE 0.2 MG: 0.2 INJECTION, SOLUTION INTRAMUSCULAR; INTRAVENOUS; SUBCUTANEOUS at 12:42

## 2024-10-26 RX ADMIN — FENTANYL CITRATE 50 MCG: 50 INJECTION, SOLUTION INTRAMUSCULAR; INTRAVENOUS at 11:55

## 2024-10-26 RX ADMIN — IOPAMIDOL 20 ML: 612 INJECTION, SOLUTION INTRAVENOUS at 12:13

## 2024-10-26 RX ADMIN — DOCUSATE SODIUM 100 MG: 100 CAPSULE, LIQUID FILLED ORAL at 21:05

## 2024-10-26 RX ADMIN — TRAZODONE HYDROCHLORIDE 100 MG: 100 TABLET ORAL at 21:05

## 2024-10-26 RX ADMIN — MIDAZOLAM 1 MG: 1 INJECTION INTRAMUSCULAR; INTRAVENOUS at 11:54

## 2024-10-26 NOTE — IR NOTE
Interventional Radiology Intra-procedural Nursing Note    Patient Name: Lamine Samano  Medical Record Number: [id number]  Today's Date: October 26, 2024    Start Time: 1156  End of procedure time: 1217  Procedure: Consented for aspiration and/or drain placement of right upper abdomen fluid collections with moderate sedation  Report given to: 88 RN  Time pt departs:  1225    Other Notes: Pt into IR suite 1 via cart. Pt awake and alert. To table in supine position. Monitoring equipment applied. VSS. Tele SR. Dr. Zapata in room. Time out and procedure started. Pt tolerated procedure well. Debrief with Dr. Zapata. Abscess ABD drain placed and pressure held until hemostasis achieved. Sample sent to the lab. Dressing CDI. No complications. Pt transferred back station 88.    Medications:    Versed 1 mg  Fentanyl 50 mcg  Lidocaine 1% 10 ml    Burke Dickey RN

## 2024-10-26 NOTE — PLAN OF CARE
10/25/2024 1900 - 10/26/2024 0730    Orientation: A&Ox4; calm and cooperative  Aggression Stop Light: green  Activity: Ax1 with GB and walker  Diet/BS Checks: mod carb diet ordered yesterday (tolerated without problems); NPO since midnight, patient verbalized understanding  Tele:  N/A  IV Access/Drains: PIV x 2; SL  Pain Management: denied pain this shift  Abnormal VS/Results: VSS; RA  Bowel/Bladder: Continent of bowel/bladder; no BM since 10/23, scheduled colace given  Skin/Wounds: Scattered bruising and redness/rash to chest.   Consults: GI, ID, oncology, surgery  D/C Disposition: pending progress/plan    Other Info:   - CIWA: 2 - has a chronic tremor in RUE

## 2024-10-26 NOTE — PROGRESS NOTES
[facility]    Hematology / Oncology Consultation     Primary oncologist: Dr Patrick Dreyer    Date of Admission:  10/24/2024    Assessment & Plan     1.  Hepatocellular carcinoma multifocal treated with microwave ablation on June 6, 2024 3 lesion treated as detailed below.    2.  Admission to the hospital with abdominal pain and additional areas of subcapsular fluid collection s/p aspiration which is consistent with abscess     3.  Leukocytosis, likely reactive concerning for infection but can also be due to hemorrhage and malignancy.    4.  Anemia likely associated with malignancy and/or bleeding.    5.  Prostate cancer [place] 8 diagnosed in December 2023. He is treated with hormone therapy ADT by urology, no evidence of metastatic disease.    6.  Mild prolongation of INR 1.25 and bilirubin 1.3.    7.  Concern for cholelithiasis and colitis on CT both may be reactive to inflammatory changes from the abscess     Discussion and recommendations:  The lesions on the liver are more consistent with treated HCC although one new lesion in the R hepatic lobe is suspicious for progression. I will update his IR physician at [facility] on Monday.    He has mild prolongation of INR could be from liver dysfunction/cirrhosis vs vitamin K deficiency, s/p one dose vitamin K 10/25   His alpha-fetoprotein in May and June was normal, I will obtain a new baseline.      Interval history: I reviewed the new lesion in the R hepatic lobe with the patient. He underwent aspiration and drain placement and purulent material was removed.       Patrick Dreyer, DO    Code Status    Full Code    Reason for Consult   Reason for consult: Hepatocellular carcinoma    Primary Care Physician   DELANEY FAM    Chief Complaint     Abdominal pain    Medical records reviewed, history obtained, patient examined.      History of Present Illness   Lamine Samano is a 73 year old male with a history of parkinson's disease, hyperlipidemia,  hepatocellular carcinoma who presents with abdominal pain. He sustained a fall into his bathtub a week ago and had persistent pain in the middle of the abdomen after hitting his abdomen during the fall. Patient was advised by his primary care provider to present to the emergency department due to his lab testing and fall.     He denies any fever, no diarrhea, he was constipated for few days and took stool softeners.    He gets hormone treatment through urology presumably leuprolide last dose was in August which is a 4-month dose over the next planned in December.He tolerates the treatment but he does have hot flashes.  He denies any bleeding or ecchymoses.    CT abdomen and pelvis 10/24/24 Obtained on admission:  HEPATOBILIARY: Diffuse hepatic steatosis is present with nodular   contour compatible with cirrhosis. Enlarging mass is seen within the   right hepatic lobe measuring 2.2 cm compared with the prior   measurement of approximately 1.2 cm (series 3, image 23). Another 2.7   cm hypoattenuating lesion along the anterior right hepatic lobe   previously measured 1.7 cm (series 3, image 25) and may reflect an   ablation cavity from recent microwave ablation. 2.2 x 1.9 cm   ill-defined mass in the left hepatic lobe appears more heterogenous   and decreased from the prior measurement of 2.6 x 2.5 cm (series 3,   image 21). Multiple new subcapsular fluid collections are seen along   the anterior surface of the liver. The largest collection measures up   to 5.5 x 2.8 cm along the anterior left hepatic lobe (series 3, image   20). There are at least 4 other collections seen measuring 5.2 x 2.6   cm, 2.9 x 2.3 cm, 3.3 x 3.5 cm and 4.0 x 1.1 cm (series 3, images 11,   14, 22 and 29). Moderate surrounding fat stranding and soft tissue   thickening is also seen. Air and gas containing collection measuring   3.3 x 1.5 cm is noted along the inferior right hepatic lobe adjacent   to the inflamed hepatic flexure of the colon  described below (series   5, image 42). Gallbladder appears mildly distended measuring up to 3.5   cm in diameter with wall thickening and surrounding fluid.   Impression:  1.  Cirrhotic appearance of the liver as described above. Multiple   subcapsular fluid collections are seen along the anterior portion of   the liver with moderate surrounding inflammatory changes. Findings may   reflect posttraumatic collections with hemorrhage given history of   recent trauma. Abscess collections are also in the differential   diagnosis. At least one collection along the inferior right hepatic   lobe has internal locule of gas as well as peripheral enhancement   suspicious for an abscess.   2.  Multiple liver lesions are present including an enlarging 2.2 cm   mass in the right hepatic lobe suspicious for malignancy.   Characterization of these lesions is limited due to single phase exam   however. A 2.2 cm lesion in the left hepatic lobe appears more   heterogenous and decreased in size from the prior exam. Furthermore,   another 2.7 cm hypoattenuating lesion in the right hepatic lobe   appears increased in size but more fluidlike in appearance. Both of   these lesions may reflect ablation cavities from recent microwave   ablation for known hepatocellular carcinoma. Recommend further   characterization of these findings with follow-up MRI liver protocol   exam.   3. Mild gallbladder wall thickening and pericholecystic fluid.   Findings may be reactive from adjacent inflammation along the liver   described above. Cholecystitis however cannot be excluded. Suggest   correlation with laboratory values and consider follow-up abdominal   ultrasound.   4. Segmental wall thickening is seen along the ascending and proximal   transverse colon at the hepatic flexure. Findings are suggestive of   colitis which may be reactive from adjacent inflammation along the   liver. Infectious versus infiltrate colitis cannot be excluded. An    underlying colonic mass is considered to be less likely but cannot be   excluded given the focal area of wall thickening. If further concern,   follow-up colonoscopy can be considered after acute symptoms resolve.     He had microwave ablation of the liver on June 6, 2024 to 3 sites of multifocal hepatocellular carcinoma, the largest lesion in segment 3 and the other 2 lesions segment 5 and segment 4B      MRI on August 30, 2024 showed ablation defects identified in segment 3, 4B and 5 of the liver.  1.6 cm LR for lesion segment 8 follow-up in 3 months suggested.      Visit summary from [facility] July 25, 2024 by Dr. Dreyer:    Assessment  1. Hepatocellular carcinoma  - 3/1/24 MRI liver with 3 lesions in the bilateral lobes measuring up to 2.6cm   - With background cirrhosis   - PSMA scan identified the liver lesions with variable activity   - s/p microwave ablation 6/6/24 with Dr. Gonsalez    - Has follow up MRI scheduled at [facility] on 12/3   - Will have his IR physician Dr. Gonsalez at [place] review his current MRI     2. Prostate cancer  - Marsha 4+4=8 on 1/4/24  - NM bone scan negative, PSMA scan with indeterminate lesion in proximal R femur, activity in the prostate gland and also the liver  - PSA was WNL     3. Anemia  - history of acute anemia requiring prbc transfusion  - Previous work up negative and hemoglobin has since recovered  - EGD/colonoscopy 11/2023 without varices per the patient     4. Alcoholism   - With cirrhosis    5. Femur lesion   - Biopsy was negative for malignancy     6. Hot flashes  - About 5 per day lasting a few minutes  - Not affecting QOL    Plan  Previously discussed case w/ Urology. Plan is to keep him on ADT and if he has no progression of his HCC at the 6 month follow up scan we will send him for definitive treatment to the prostate.   3 month follow up scans are scheduled through Dr. Gonsalez   Will repeat CT CAP in 4 months along with MRI (this will be the 6 month ozzie  scan)  RTC in 4 months   All questions answered   Continue ADT through Dr. Mark     ______________________________________________________________________________________________    History of Present Illness  The patient is a 73-year-old male who presented to us on 5/2/2024 for the further evaluation of a new diagnosis of hepatocellular carcinoma.  The patient was in his normal state of health when he was undergoing workup for an enlarged prostate.  An MRI of the prostate revealed a highly suspicious lesion.  A prostate biopsy was performed which revealed an adenocarcinoma which was [place] 8.  As part of the workup the patient was being evaluated for metastatic disease.  There were some equivocal lesions in the liver.  Ultimately this required a biopsy to diagnose hepatocellular carcinoma.  The patient has lost about 15 pounds over the past 2 to 3 months.  He denies any bleeding.  Around October or November 2023 he was being evaluated for anemia.  Upper and lower endoscopy were negative for sources of bleed.  His hemoglobin since recovered.  He was being seen by nurse practitioner was suspicious he may have had iron deficiency.  He drinks alcohol daily.  He is not trying to quit alcohol.      MRI prostate 12/20/2023 PI-RADS 5 with suspected extracapsular extension and possible neurovascular bundle invasion, no lymph node involvement.    1/4/2024 prostate biopsy revealed a [place]ha 4+4 = 8 adenocarcinoma of the prostate.  PSA was 1.1 on 10/21/2023    1/29/2024 CT scan revealed prostate mass, cirrhotic liver with 3 enhancing masses possible HCC, mild portal hypertension, small gastric varices, mild pulmonary fibrosis in lung bases    1/29/2024 nuclear medicine bone scan negative    2/21/2024 PSMA scan with prostate gland enhancement, multiple radiotracer avid hepatic mets, focal uptake of right proximal femoral diaphysis indeterminant    3/1/2024 MRI liver with 3 lesions in the liver the largest being 2.6 cm,  present in both lobes of the liver.    4/16/2024 biopsy of liver lesion revealed moderately differentiated hepatocellular carcinoma, with background cirrhosis    - s/p microwave ablation 6/6/24 with Dr. Gonsalez    Interval History  Doing well. NO weight loss. Having hot flashes but otherwise tolerating ADT  Review of Systems  Remaining 14 point comprehensive review of systems within normal limits. NCCN Distress Thermometer and Problem List were collected and documented in the patient chart.  Remarkable symptoms and concerns were discussed with the patient.  Any additional follow-up is indicated in the plan.  Past Medical and Surgical History  Prostate cancer, hepatocellular carcinoma, shingles, alcoholism      Current Medications  Medication List  Name Date  Calcium 600 + D(3) (Calcium-Cholecalciferol Oral 600 mg-10 mcg (400 unit)) 05/02/2024  Carbidopa-Levodopa Oral 25 mg-100 mg 05/02/2024  Folic Acid Oral 05/02/2024  Potassium Gluconate Oral 05/02/2024  Trazodone Oral 05/02/2024  Hydroxyzine HCl Oral 05/02/2024  Triamcinolone Topical Cream 0.5 % 05/02/2024  Omeprazole Oral Delayed Release Capsule 05/02/2024  Atenolol Oral 05/02/2024  Doxepin Oral ([place]) 05/02/2024  Vitamin B-12 (Cyanocobalamin Oral) 05/02/2024  Mirtazapine Oral 05/02/2024  Furosemide Oral 05/02/2024    Allergies  Penicillins and levothyroxine  Family History  Denies family history of malignancy      Social History  He has 1 son and 1 daughter.  His daughter presented with him today.  He is .  He previously smoked cigarettes but quit in 2006.  He drinks at least 3 glasses of scotch per night.      Vital Signs  Blood pressure: 119/81, Pulse: 74, Temperature: 97.3 F, Respirations: 16, O2 sat: 96%, Pain Scale: 0, Height: 68.4 in, Weight: 173.4 lb, BSA: 1.93, BMI: 26.06 kg/m2  Covid-19 vaccine (Moderna) (05/02/2024), Patient declined/rejected; Flu vaccine - Adult (05/02/2024), Patient declined/rejected  Performance Status ECOG or  Karnofsky  ECOG: Not recorded  Karnofsky: Not recorded    Physical Exam  The patient is in no distress, alert and oriented.         Review of Systems   The 10 point Review of Systems is negative other than noted in the HPI    Physical Exam   Temp: 98.6  F (37  C) Temp src: Oral BP: 114/70 Pulse: 106   Resp: 19 SpO2: 92 % O2 Device: None (Room air) Oxygen Delivery: 2 LPM  Vital Signs with Ranges  Temp:  [97.9  F (36.6  C)-98.8  F (37.1  C)] 98.6  F (37  C)  Pulse:  [] 106  Resp:  [13-26] 19  BP: ()/(57-86) 114/70  SpO2:  [90 %-99 %] 92 %  159 lbs 0 oz    Pleasant in no acute distress  HEENT normocephalic atraumatic sclera anicteric.  Neck supple no JVD.  Lungs: No respiratory distress no wheezing.  Abdomen: Soft moderate tenderness mostly on the right and mid abdomen.  Extremities no edema.  Lymphatic: Lymphadenopathy in cervical supraclavicular axillary epitrochlear or inguinal areas.  Neurological: Nonfocal.  Skin: Limited exam shows no petechia or ecchymosis.    Data   Results for orders placed or performed during the hospital encounter of 10/24/24 (from the past 24 hours)   Comprehensive metabolic panel   Result Value Ref Range    Sodium 128 (L) 135 - 145 mmol/L    Potassium 3.5 3.4 - 5.3 mmol/L    Carbon Dioxide (CO2) 25 22 - 29 mmol/L    Anion Gap 10 7 - 15 mmol/L    Urea Nitrogen 11.7 8.0 - 23.0 mg/dL    Creatinine 0.79 0.67 - 1.17 mg/dL    GFR Estimate >90 >60 mL/min/1.73m2    Calcium 8.1 (L) 8.8 - 10.4 mg/dL    Chloride 93 (L) 98 - 107 mmol/L    Glucose 129 (H) 70 - 99 mg/dL    Alkaline Phosphatase 159 (H) 40 - 150 U/L    AST 66 (H) 0 - 45 U/L    ALT 5 0 - 70 U/L    Protein Total 6.3 (L) 6.4 - 8.3 g/dL    Albumin 2.7 (L) 3.5 - 5.2 g/dL    Bilirubin Total 1.2 <=1.2 mg/dL   CBC with platelets   Result Value Ref Range    WBC Count 14.4 (H) 4.0 - 11.0 10e3/uL    RBC Count 3.00 (L) 4.40 - 5.90 10e6/uL    Hemoglobin 9.3 (L) 13.3 - 17.7 g/dL    Hematocrit 27.8 (L) 40.0 - 53.0 %    MCV 93 78 - 100 fL     MCH 31.0 26.5 - 33.0 pg    MCHC 33.5 31.5 - 36.5 g/dL    RDW 14.6 10.0 - 15.0 %    Platelet Count 355 150 - 450 10e3/uL   INR   Result Value Ref Range    INR 1.20 (H) 0.85 - 1.15   Iron and iron binding capacity   Result Value Ref Range    Iron 28 (L) 61 - 157 ug/dL    Iron Binding Capacity 122 (L) 240 - 430 ug/dL    Iron Sat Index 23 15 - 46 %   Reticulocyte count   Result Value Ref Range    % Reticulocyte 2.7 (H) 0.5 - 2.0 %    Absolute Reticulocyte 0.079 0.025 - 0.095 10e6/uL   Glucose by meter   Result Value Ref Range    GLUCOSE BY METER POCT 125 (H) 70 - 99 mg/dL   Glucose by meter   Result Value Ref Range    GLUCOSE BY METER POCT 121 (H) 70 - 99 mg/dL   Glucose by meter   Result Value Ref Range    GLUCOSE BY METER POCT 109 (H) 70 - 99 mg/dL   Glucose by meter   Result Value Ref Range    GLUCOSE BY METER POCT 114 (H) 70 - 99 mg/dL   IR Peritoneal Abscess Drainage    [facility] RADIOLOGY  LOCATION: [facility]  DATE: 10/26/2024    PROCEDURE: US AND FLUOROSCOPY GUIDED DRAINAGE CATHETER PLACEMENT    INTERVENTIONAL RADIOLOGIST: Sumit Zapata MD.    INDICATION: 73-year-old man with history of hepatocellular carcinoma  status post microwave ablation liver lesions 6/8/2024. Presents with  upper abdominal pain, found to have perihepatic fluid collection  suspected B abscess along the left hepatic lobe. Also has a small  abscess adjacent to the gallbladder. Presents for drainage catheter  placement of the left perihepatic abscesses. Cholecystostomy tube  placement was also discussed, however HIDA scan showed patent cystic  duct and any gallbladder inflammation is probably related to the  adjacent abscess and unlikely to benefit from a cholecystostomy tube.    CONSENT: The risks, benefits and alternatives of imaging guided  abscess drainage catheter placement were discussed with the patient   in detail. All questions were answered. Informed consent was given to  proceed with the procedure.    MODERATE SEDATION:  Versed 1 mg IV; Fentanyl 50 mcg IV.  Under  physician supervision, Versed and fentanyl were administered for  moderate sedation. Pulse oximetry, heart rate and blood pressure were  continuously monitored by an independent trained observer. The  physician spent 21 minutes of face-to-face sedation time with the  patient.    CONTRAST: 20 cc into the abscess  ANTIBIOTICS: None  ADDITIONAL MEDICATIONS: None    FLUOROSCOPIC TIME: 1.6 minutes.  RADIATION DOSE: Air Kerma: 25.4 mGy    COMPLICATIONS: No immediate complications.    STERILE BARRIER TECHNIQUE: Maximum sterile barrier technique was used.  Cutaneous antisepsis was performed at the operative site with  application of 2% chlorhexidine and large sterile drape. Prior to the  procedure, the  and assistant performed hand hygiene and wore  hat, mask, sterile gown, and sterile gloves during the entire  procedure.    PROCEDURE: A limited ultrasound of the upper abdomen demonstrate  hypoechoic fluid collection adjacent to the left hepatic lobe. This  was targeted for drainage catheter placement.    The overlying skin and subcutaneous soft tissues were anesthetized  using 1% lidocaine. Under US guidance a 5 [place] Yueh needle was  inserted into the fluid collection. Contrast was injected through the  needle to outline the fluid collection. A wire was advanced and coiled  within the fluid collection. Following tract dilation, a 12 [place]  drainage catheter was advanced into the collection and pigtail  catheter loop was formed. Catheter was secured to the skin..   10 mL  of purulent fluid was removed and sent for culture and sensitivity.  Contrast was injected through the drain and sinogram was performed  which demonstrated the loop of the catheter to be within the fluid  collection. No immediate complications.    FINDINGS:  12 Fr drain placed into left perihepatic abscess. Drain injection  showed the left suzy-hepatic abscesses appeared to communicate with  each  other.       Impression    IMPRESSION:    1. Successful ultrasound and fluoroscopic guided perihepatic abscess  drain placement using a 12F locking pigtail catheter.  2. 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 There  appears to be a thin fistulous tract between the abscess adjacent to  the lower left hepatic lobe and the stomach through segment 3 of the  liver, most evident on CT. The segment 3 liver lesion which was  ablated in July was directly adjacent to the stomach at that time.     PLAN:  1. Patient to be recovered on the floor.  2. Left perihepatic drain to bulb suction.  3. Flush with 10 mL NS tid.  4. Follow-up with IR once drain outputs are < 10 mL/day x 2 days with  CT w/ contrast of the abdomen/pelvis and drain sinogram.     Sumit Zapata MD         SYSTEM ID:  [id number]   IR Procedure Note    Narrative    Sumit Zapata MD     10/26/2024 12:33 PM  [facility]    Procedure: IR Procedure Note    Date/Time: 10/26/2024 12:30 PM    Performed by: Sumit Zapata MD  Authorized by: Sumit Zapata MD  IR Fellow Physician:    Pre Procedure Diagnosis: Perihepatic fluid collection  Post Procedure Diagnosis: same    UNIVERSAL PROTOCOL   Site Marked: NA  Prior Images Obtained and Reviewed:  Yes  Required items: Required blood products, implants, devices and special   equipment available    Patient identity confirmed:  Verbally with patient and arm band  Patient was reevaluated immediately before administering moderate or deep   sedation or anesthesia  Confirmation Checklist:  Patient's identity using two indicators and   relevant allergies  Time out: Immediately prior to the procedure a time out was called    Universal Protocol: the Joint Commission Universal Protocol was followed    Preparation: Patient was prepped and draped in usual sterile fashion      SEDATION  Patient Sedated: Yes    Vital  signs: Vital signs monitored during sedation    Findings: Successful 12 Fr drain placement into fluid collection adjacent   to left liver. Aspirated fluid appeared purulent.       PROCEDURE    Length of time physician/provider present for 1:1 monitoring during   sedation:  0-22 min   Glucose by meter   Result Value Ref Range    GLUCOSE BY METER POCT 122 (H) 70 - 99 mg/dL

## 2024-10-26 NOTE — PROGRESS NOTES
IR was consulted to consider subcapsular liver fluid collection aspiration/drain placement and cholecystostomy tube for Mr. Samano. Reviewing his imaging shows 2 subcapsular collections along the left hepatic lobe, one measuring 5 cm, the other 2 cm, with adjacent fat stranding. These correspond to his region of pain and are concerning for abscesses. Will plan for aspiration or drain placement into these collections.     His gallbladder appears relatively normal on CT. There is a small suspected subhepatic abscess adjacent to the gallbladder (series 5, image 40) measuring 2.7 x 1 cm.  This may be causing some secondary inflammation of the adjacent gallbladder. The cystic duct was patent on HIDA scan. Given this, I don't feel cholecystostomy tube would benefit him. The abscess adjacent to the gallbladder is too small for a drain at this time.     Will plan to address the left subhepatic collections today.   Discussed the above plan with Dr. Sanders.     Sumit Zapata MD  Interventional Radiology   gi

## 2024-10-26 NOTE — PROCEDURES
[facility]    Procedure: IR Procedure Note    Date/Time: 10/26/2024 12:30 PM    Performed by: Sumit Zapata MD  Authorized by: Sumit Zapata MD  IR Fellow Physician:    Pre Procedure Diagnosis: Perihepatic fluid collection  Post Procedure Diagnosis: same    UNIVERSAL PROTOCOL   Site Marked: NA  Prior Images Obtained and Reviewed:  Yes  Required items: Required blood products, implants, devices and special equipment available    Patient identity confirmed:  Verbally with patient and arm band  Patient was reevaluated immediately before administering moderate or deep sedation or anesthesia  Confirmation Checklist:  Patient's identity using two indicators and relevant allergies  Time out: Immediately prior to the procedure a time out was called    Universal Protocol: the Joint Commission Universal Protocol was followed    Preparation: Patient was prepped and draped in usual sterile fashion      SEDATION  Patient Sedated: Yes    Vital signs: Vital signs monitored during sedation    Findings: Successful 12 Fr drain placement into fluid collection adjacent to left liver. Aspirated fluid appeared purulent.       PROCEDURE    Length of time physician/provider present for 1:1 monitoring during sedation:  0-22 min

## 2024-10-26 NOTE — PROGRESS NOTES
"GASTROENTEROLOGY PROGRESS NOTE        SUBJECTIVE:  Patient reports that he is NPO for IR procedure. Spouse at bedside.      OBJECTIVE:  General Appearance:  Not in distress   BP (!) 151/78 (BP Location: Left arm)   Pulse 95   Temp 97.9  F (36.6  C) (Oral)   Resp 18   Ht 1.753 m (5' 9\")   Wt 72.1 kg (159 lb)   SpO2 94%   BMI 23.48 kg/m    Temp (24hrs), Av.3  F (36.8  C), Min:97.9  F (36.6  C), Max:98.8  F (37.1  C)    Patient Vitals for the past 72 hrs:   Weight   10/24/24 0912 72.1 kg (159 lb)       Intake/Output Summary (Last 24 hours) at 10/26/2024 1144  Last data filed at 10/25/2024 2127  Gross per 24 hour   Intake 240 ml   Output 100 ml   Net 140 ml        PHYSICAL EXAM  General: alert, oriented, NAD  SKIN: no suspicious lesions, rashes, jaundice, or spider angiomas   EYES: No scleral icterus   RESPIRATORY: Non labored breathing  NEURO:Grossly WNL.  Additional Comments:  ROS, FH, SH: See initial GI consult for details.        Recent Labs   Lab Test 10/25/24  1440 10/24/24  1401 10/24/24  1021 10/21/23  1112 10/20/23  1801   WBC 14.4*  --  17.4*  --  6.4   HGB 9.3*  --  9.9* 9.0* 9.3*   MCV 93  --  92  --  85     --  367  --  346   INR 1.20* 1.25*  --   --   --      Recent Labs   Lab Test 10/25/24  1440 10/24/24  1021 10/21/23  1112   POTASSIUM 3.5 3.9 3.6   CHLORIDE 93* 92* 103   CO2 25 23 22   BUN 11.7 15.3 8.9   ANIONGAP 10 16* 13     Recent Labs   Lab Test 10/25/24  1440 10/24/24  1021 10/21/23  1112   ALBUMIN 2.7* 3.0* 3.2*   BILITOTAL 1.2 1.3* 1.1   ALT 5 16 <5   AST 66* 51* 23   LIPASE  --  23  --      MELD 3.0: 17 at 10/25/2024  2:40 PM  MELD-Na: 9 at 10/25/2024  2:40 PM  Calculated from:  Serum Creatinine: 0.79 mg/dL (Using min of 1 mg/dL) at 10/25/2024  2:40 PM  Serum Sodium: 128 mmol/L at 10/25/2024  2:40 PM  Total Bilirubin: 1.2 mg/dL at 10/25/2024  2:40 PM  Serum Albumin: 2.7 g/dL at 10/25/2024  2:40 PM  INR(ratio): 1.2 at 10/25/2024  2:40 PM  Age at listing (hypothetical): 73 " years  Sex: Male at 10/25/2024  2:40 PM      Imaging and procedures:    I have reviewed the patient's new clinical lab results    Problem list pertaining to GI:  Liver lesion - History of HCC follows -up with [facility]    Assessment: 73 year old male who has a history of alcohol abuse, Parkinson's disease, prostate cancer on androgen deprivation therapy, hypertension, type 2 diabetes, cirrhosis of the liver.  He reports he was diagnosed with liver cancer (per chart hepatocellular carcinoma) in the spring 2024.  He had ablation x 1, however, the chart reports possibly every 3 months.  He receives care in [facility]. He has not seen liver provider.     MRI showed 10/25  1.  Post ablative changes of underlying 3 hepatic metastasis which demonstrate no significant T2 signal abnormality which is resolved with new T1 signal internally indicating hemorrhagic components and no significant enhancement.  2.  Subcapsular/subdiaphragmatic complex fluid collection along the anterior aspect left hepatic lobe extending to the third metastasis and left hepatic lobe inferiorly. Dimensions as detailed above. 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 Continued attention to this on follow-up is recommended.     3.  A 0.9 cm focus of enhancement involving the anterosuperior aspect of the right hepatic lobe in the extreme subdiaphragmatic space of the right hepatic lobe. Attention to this on follow-up is recommended to assess for possible hepatic metastasis at  this location.    Plan:  - IR consulted for drain placement   - Surgery saw him and recommended cholecystostomy for possible cholecystitis, Per IR cholecystomy tube won't be beneficial.    - Follow-up in hepatology clinic  - Follow-up in oncology as recommended   - ID consulted - antibiotics discontinued   - I discussed with Dr. Burkett. GI will sign off    I will discuss with Dr. Burkett    Time spent: 20 minutes, greater than 50% of the visit was  spent in counseling/coordination of care.     Callie Cox CNP  [facility] ([facility])  Mobile # [phone]

## 2024-10-26 NOTE — PROGRESS NOTES
"[facility]    Hospitalist Progress Note    Interval History   - Ongoing abdominal pain, central, unchanged  - Wife Maryellen at bedside updated  Addendum: Discussed with IR x2 today, firstly they stated that this was unlikely to be chronic cholecystitis. 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 Will start antibiotics. Plan is to continue drainage manage with IR primarily, no clear surgical involvement needed, appreciate any GI input    Assessment & Plan   Summary: Lamine Samano is a 73 year old male with PMH hepatocellular carcinoma on microwave ablation, Parkinson's disease, alcohol use disorder, history of prostate cancer on androgen deprivation therapy, hypertension, type 2 diabetes mellitus, cirrhosis of liver, who was admitted on 10/24/2024 with abdominal pain and liver lesions of unclear etiology.    Abdominal pain  Liver abscess versus  traumatic subcapsular hepatic hematoma  Suspect chronic cholecystitis  Possible colitis  Leukocytosis  Pain started 24 hours after fall 9 days PTA. No fever, nausea vomiting or diarrhea, note leukocytosis to 17k on admission. CT abdomen pelvis-multiple subcapsular fluid collection with surrounding inflammatory changes, posttraumatic collection of hemorrhage versus abscess.  Multiple liver lesions, possible HCC.  Mild gallbladder wall thickening and pericholecystic fluid, could be reactive from adjacent inflammation along the liver, segmental wall thickening along the ascending and proximal transverse colon at the hepatic flexure, possible colitis which may be reactive from adjacent inflammation along the liver. Follow up liver MRI shows hepatic metastases as well as a \"Subcapsular/subdiaphragmatic complex fluid collection along the anterior aspect left hepatic lobe extending to the third metastasis and left hepatic lobe inferiorly... This likely represents a subdiaphragmatic abscess.\" HIDA scan " suspicious for chronic cholecystitis. On exam he has liver enlargement and epigastric tenderness over area of liver lesions.  - Cefepime and flagyl started in ED and stopped by ID  - Appreciate ID consult   - Monitor off antibiotics, discussed with them  - Appreciate Surgery consult  - Appreciate IR consult   - Cholecystectomy drain   - Liver aspiration  - Pain control with p.o. oxycodone/IV Dilaudid    Hepatocellular carcinoma  History of prostate cancer  Known history of HCC status post microwave ablation, supposed to get neck cycle of treatment November, and prostate cancer on androgen deprivation therapy, follows with Dr. Dreyer from [facility]  - Oncology consult appreciated    Liver cirrhosis  History of alcohol use disorder  Hyperbilirubinemia, mild  Elevated transaminases, mild  Reports still drinking about 2 drinks a day, has cut down from previously.  Never had alcohol withdrawal before.   - No  CIWA, no significant withdrawal noted this admission  - [facility] consulted    Parkinson's disease  - PTA Sinemet    Hyponatremia, mild  -Monitor  -Further workup if remains low or decreases further    Chronic anemia  Appears like over the last year hemoglobin has been around 10-12. Hemoglobin at presentation 9.9. Patient denies any active bleed.  But does have evidence of possible hematoma as above  - Monitor for further drop and if remains stable would recommend outpatient follow-up    Type 2 diabetes mellitus  - Hold PTA metformin  - Hemoglobin A1c on 9/18/2024 was 6.3  - SSI with low resistance for now    Hypertension  - Holding PTA atenolol    Clinically Significant Risk Factors         # Hyponatremia: Lowest Na = 128 mmol/L in last 2 days, will monitor as appropriate  # Hypochloremia: Lowest Cl = 92 mmol/L in last 2 days, will monitor as appropriate    # Hypomagnesemia: Lowest Mg = 1.2 mg/dL in last 2 days, will replace as needed   # Hypoalbuminemia: Lowest albumin = 2.7 g/dL at 10/25/2024  2:40 PM, will  monitor as appropriate    # Coagulation Defect: INR = 1.20 (Ref range: 0.85 - 1.15) and/or PTT = 36 Seconds (Ref range: 22 - 38 Seconds), will monitor for bleeding    # Hypertension: Noted on problem list                       PT/OT: ordered  Diet: NPO per Anesthesia Guidelines for Procedure/Surgery Except for: Meds, Ice Chips    DVT Prophylaxis: Pneumatic Compression Devices  Diaz Catheter: Not present  Lines: None     Cardiac Monitoring: None  Code Status: Full Code    Medically Ready for Discharge: Anticipated in 2-4 Days    - Total time spent on encounter is 35 minutes, which includes counseling patient and family, coordination of care, managing orders, interpreting tests, and/or time spent discussing with consultants.      Parveen Sanders MD  Hospitalist Service  [facility]  Securely message with Vocera (more info)  Text page via [employer] Paging/Directory       Data reviewed today: I reviewed all new labs and imaging results over the last 24 hours.    Physical Exam   Temp: 98.1  F (36.7  C) Temp src: Oral BP: 113/77 Pulse: 92   Resp: 18 SpO2: 93 % O2 Device: None (Room air)    Vitals:    10/24/24 0912   Weight: 72.1 kg (159 lb)     Vital Signs with Ranges  Temp:  [98.1  F (36.7  C)-98.8  F (37.1  C)] 98.1  F (36.7  C)  Pulse:  [] 92  Resp:  [18] 18  BP: (113-133)/(76-78) 113/77  SpO2:  [93 %-96 %] 93 %  I/O last 3 completed shifts:  In: 240 [P.O.:240]  Out: 275 [Urine:275]  O2 requirements: none    Constitutional: Male in NAD, resting tremor  HEENT: Eyes nonicteric, oral mucosa moist  Cardiovascular: RRR, normal S1/2, no m/r/g  Respiratory: CTAB, no wheezing or crackles  Vascular: No LE pitting edema  GI: Normoactive bowel sounds, nontender, nondistended  Skin/Integumen: No rashes  Neuro/Psych: Appropriate affect and mood. A&Ox3, moves all extremities    Medications   Current Facility-Administered Medications   Medication Dose Route Frequency Provider Last Rate Last Admin      Current Facility-Administered Medications   Medication Dose Route Frequency Provider Last Rate Last Admin    carbidopa-levodopa (SINEMET)  MG per tablet 1 tablet  1 tablet Oral BID Rhonda Farmer MD   1 tablet at 10/25/24 2031    docusate sodium (COLACE) capsule 100 mg  100 mg Oral BID Rhonda Farmer MD   100 mg at 10/25/24 2031    folic acid (FOLVITE) tablet 1 mg  1 mg Oral Daily Rhonda Farmer MD        insulin aspart (NovoLOG) injection (RAPID ACTING)  1-7 Units Subcutaneous TID AC Rhonda Farmer MD   1 Units at 10/24/24 1844    insulin aspart (NovoLOG) injection (RAPID ACTING)  1-5 Units Subcutaneous At Bedtime Rhonda Farmer MD        morphine (PF) injection 3 mg  3 mg Intravenous Once Shelly Garcia MD        multivitamin w/minerals (THERA-VIT-M) tablet 1 tablet  1 tablet Oral Daily Rhonda Farmer MD        sodium chloride (PF) 0.9% PF flush 3 mL  3 mL Intracatheter Q8H Rhonda Farmer MD   3 mL at 10/26/24 0656    thiamine (B-1) tablet 100 mg  100 mg Oral Daily Rhonda Farmer MD           Data   Recent Labs   Lab 10/26/24  0155 10/25/24  2126 10/25/24  1633 10/25/24  1440 10/24/24  1421 10/24/24  1401 10/24/24  1021   WBC  --   --   --  14.4*  --   --  17.4*   HGB  --   --   --  9.3*  --   --  9.9*   MCV  --   --   --  93  --   --  92   PLT  --   --   --  355  --   --  367   INR  --   --   --  1.20*  --  1.25*  --    NA  --   --   --  128*  --   --  131*   POTASSIUM  --   --   --  3.5  --   --  3.9   CHLORIDE  --   --   --  93*  --   --  92*   CO2  --   --   --  25  --   --  23   BUN  --   --   --  11.7  --   --  15.3   CR  --   --   --  0.79  --   --  0.85   ANIONGAP  --   --   --  10  --   --  16*   JORGE  --   --   --  8.1*  --   --  8.4*   * 121* 125* 129*   < >  --  125*   ALBUMIN  --   --   --  2.7*  --   --  3.0*   PROTTOTAL  --   --   --  6.3*  --   --  7.2   BILITOTAL  --   --   --  1.2  --   --  1.3*   ALKPHOS  --   --   --  159*  --   --  168*   ALT  --   --   --  5  --   --  16    AST  --   --   --  66*  --   --  51*   LIPASE  --   --   --   --   --   --  23    < > = values in this interval not displayed.       Imaging:   Recent Results (from the past 24 hours)   NM HepatOBiliary Scan    Narrative    EXAM: NM HEPATOBILIARY SCAN  LOCATION: [facility]  DATE: 10/25/2024    INDICATION: Right upper quadrant abdominal pain  COMPARISON: Liver MRI dated 10/24/2024.  TECHNIQUE: 7.0 mCi of Tc-99m mebrofenin, IV. Anterior planar of the abdomen. Delayed imaging 100 minutes post injection was obtained and reviewed    FINDINGS: Normal radionuclide activity in liver, bile ducts, and small bowel with nonvisualization of the gallbladder after 60 minutes, however the gallbladder was not identified excluding acute cystic duct obstruction but indicating chronic gallbladder   inflammation (chronic cholecystitis)      Impression    IMPRESSION:     Findings suspicious for chronic cholecystitis.

## 2024-10-26 NOTE — PLAN OF CARE
Goal Outcome Evaluation:                    Orientation: A&Ox4; calm and cooperative  Aggression Stop Light: green  Activity: Ax1 with GB and walker to BR, uses urinal also  Diet/BS Checks: mod carb diet, fair appetite  Tele:  N/A  IV Access/Drains: PIV x 1; SL for iv abx resumed today  Pain Management: abd pain, sl worse since abscess drain placed today. Dil Iv an PO effective.   Abnormal VS/Results: VSS; RA  Bowel/Bladder: Continent of bowel/bladder; no BM since 10/23, scheduled colace given  Skin/Wounds: Scattered bruising and redness/rash to chest.   Consults: GI, ID, oncology, surgery  D/C Disposition: pending progress/plan    Other Info:   -CIWA discontinued  -Flush ASHLIE, purulent tan, dressing CDI

## 2024-10-26 NOTE — PROGRESS NOTES
"[facility]  General Surgery Progress Note        Basim Cross MD   10/26/2024        Interval History:      IR drainage of suzy-hepatic abscess.  They are purulent.  Management per IR.  They did leave his gallbladder alone         Assessment and Plan:      1) Advance to a regular diet  2) Please reconsult if he cannot tolerate diet or his RUQ abdominal pain becomes severe.  His gallbladder appears not to be the problem.                    Physical Exam:      Blood pressure 114/70, pulse 106, temperature 98.6  F (37  C), temperature source Oral, resp. rate 19, height 1.753 m (5' 9\"), weight 72.1 kg (159 lb), SpO2 92%.  Vitals:    10/24/24 0912   Weight: 72.1 kg (159 lb)     Vital Signs with Ranges  Temp:  [97.9  F (36.6  C)-98.8  F (37.1  C)] 98.6  F (37  C)  Pulse:  [] 106  Resp:  [13-26] 19  BP: ()/(57-86) 114/70  SpO2:  [90 %-99 %] 92 %  I/O's Last 24 hours  I/O last 3 completed shifts:  In: 240 [P.O.:240]  Out: 275 [Urine:275]    Drains with purulent material in them.             Medications:        Current Facility-Administered Medications   Medication Dose Route Frequency Provider Last Rate Last Admin    carbidopa-levodopa (SINEMET)  MG per tablet 1 tablet  1 tablet Oral BID Rhonda Farmer MD   1 tablet at 10/26/24 0902    ceFEPIme (MAXIPIME) 2 g vial to attach to  mL bag for ADULTS or NS 50 mL bag for PEDS  2 g Intravenous Q8H Parveen Sanders MD        docusate sodium (COLACE) capsule 100 mg  100 mg Oral BID Rhonda Farmer MD   100 mg at 10/26/24 0902    folic acid (FOLVITE) tablet 1 mg  1 mg Oral Daily Rhonda Farmer MD        insulin aspart (NovoLOG) injection (RAPID ACTING)  1-7 Units Subcutaneous TID AC Rhonda Farmer MD   1 Units at 10/24/24 1844    insulin aspart (NovoLOG) injection (RAPID ACTING)  1-5 Units Subcutaneous At Bedtime Rhonda Farmer MD        metroNIDAZOLE (FLAGYL) infusion 500 mg  500 mg Intravenous Q8H Parveen Sanders MD        morphine " (PF) injection 3 mg  3 mg Intravenous Once Shelly Garcia MD        multivitamin w/minerals (THERA-VIT-M) tablet 1 tablet  1 tablet Oral Daily Rhonda Farmer MD        sodium chloride (PF) 0.9% PF flush 3 mL  3 mL Intracatheter Q8H Rhonda Farmer MD   3 mL at 10/26/24 0656    thiamine (B-1) tablet 100 mg  100 mg Oral Daily Rhonda Farmer MD                Data:      All new lab and imaging data was reviewed.   Recent Labs   Lab Test 10/25/24  1440 10/24/24  1401 10/24/24  1021 10/21/23  1112 10/20/23  1801   WBC 14.4*  --  17.4*  --  6.4   HGB 9.3*  --  9.9* 9.0* 9.3*   MCV 93  --  92  --  85     --  367  --  346   INR 1.20* 1.25*  --   --   --

## 2024-10-27 LAB
GLUCOSE BLDC GLUCOMTR-MCNC: 101 MG/DL (ref 70–99)
GLUCOSE BLDC GLUCOMTR-MCNC: 147 MG/DL (ref 70–99)
GLUCOSE BLDC GLUCOMTR-MCNC: 153 MG/DL (ref 70–99)
GLUCOSE BLDC GLUCOMTR-MCNC: 154 MG/DL (ref 70–99)

## 2024-10-27 PROCEDURE — 99232 SBSQ HOSP IP/OBS MODERATE 35: CPT | Performed by: INTERNAL MEDICINE

## 2024-10-27 PROCEDURE — 250N000011 HC RX IP 250 OP 636: Mod: JZ | Performed by: INTERNAL MEDICINE

## 2024-10-27 PROCEDURE — 250N000013 HC RX MED GY IP 250 OP 250 PS 637: Performed by: INTERNAL MEDICINE

## 2024-10-27 PROCEDURE — 120N000001 HC R&B MED SURG/OB

## 2024-10-27 RX ADMIN — CEFEPIME 2 G: 2 INJECTION, POWDER, FOR SOLUTION INTRAVENOUS at 02:13

## 2024-10-27 RX ADMIN — CEFEPIME 2 G: 2 INJECTION, POWDER, FOR SOLUTION INTRAVENOUS at 13:13

## 2024-10-27 RX ADMIN — DOCUSATE SODIUM 100 MG: 100 CAPSULE, LIQUID FILLED ORAL at 08:19

## 2024-10-27 RX ADMIN — METRONIDAZOLE 500 MG: 500 INJECTION, SOLUTION INTRAVENOUS at 14:21

## 2024-10-27 RX ADMIN — DOCUSATE SODIUM 100 MG: 100 CAPSULE, LIQUID FILLED ORAL at 21:12

## 2024-10-27 RX ADMIN — HYDROMORPHONE HYDROCHLORIDE 2 MG: 2 TABLET ORAL at 17:34

## 2024-10-27 RX ADMIN — HYDROMORPHONE HYDROCHLORIDE 2 MG: 2 TABLET ORAL at 08:25

## 2024-10-27 RX ADMIN — INSULIN ASPART 1 UNITS: 100 INJECTION, SOLUTION INTRAVENOUS; SUBCUTANEOUS at 13:45

## 2024-10-27 RX ADMIN — Medication 1 TABLET: at 08:26

## 2024-10-27 RX ADMIN — METRONIDAZOLE 500 MG: 500 INJECTION, SOLUTION INTRAVENOUS at 02:17

## 2024-10-27 RX ADMIN — HYDROMORPHONE HYDROCHLORIDE 1 MG: 2 TABLET ORAL at 21:11

## 2024-10-27 RX ADMIN — TRAZODONE HYDROCHLORIDE 100 MG: 100 TABLET ORAL at 21:12

## 2024-10-27 RX ADMIN — CARBIDOPA AND LEVODOPA 1 TABLET: 25; 250 TABLET ORAL at 08:19

## 2024-10-27 NOTE — PROGRESS NOTES
Slept well overnight. Drain in place with purulent and serosanguinous output. VSS on room air. Denies pain. A&Ox4. Ambulating SBA. On intermittent IV antibiotics. Tolerating regular diet. PT/OT ordered.

## 2024-10-27 NOTE — PROGRESS NOTES
"[facility]    Hospitalist Progress Note    Interval History   - Abdominal pain improved, slightly improved energy today    Assessment & Plan   Summary: Lamine Samano is a 73 year old male with PMH hepatocellular carcinoma on microwave ablation, Parkinson's disease, alcohol use disorder, history of prostate cancer on androgen deprivation therapy, hypertension, type 2 diabetes mellitus, cirrhosis of liver, who was admitted on 10/24/2024 with abdominal pain and liver lesions of unclear etiology.    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 No fever, nausea vomiting or diarrhea, note leukocytosis to 17k on admission. CT abdomen pelvis-multiple subcapsular fluid collection with surrounding inflammatory changes, posttraumatic collection of hemorrhage versus abscess.  Multiple liver lesions, possible HCC.  Mild gallbladder wall thickening and pericholecystic fluid, could be reactive from adjacent inflammation along the liver, segmental wall thickening along the ascending and proximal transverse colon at the hepatic flexure, possible colitis which may be reactive from adjacent inflammation along the liver. Follow up liver MRI shows hepatic metastases as well as a \"Subcapsular/subdiaphragmatic complex fluid collection along the anterior aspect left hepatic lobe extending to the third metastasis and left hepatic lobe inferiorly... This likely represents a subdiaphragmatic abscess.\" HIDA scan suspicious for chronic cholecystitis. On exam he has liver enlargement and epigastric tenderness over area of liver lesions.   Seen by IR on 10/26, chronic cholecystitis felt to be unlikely from HIDA scan. Patient underwent drainage of liver lesion with pus return and confirmed to be liver abscess. 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  - " Continue Cefepime and Flagyl  - Appreciate ID consult  - Appreciate Surgery consult  - Appreciate IR consult   - S/p liver drain 10/26 in place  - Await abscess cultures  - Pain control with p.o. oxycodone/IV Dilaudid    Hepatocellular carcinoma  History of prostate cancer  Known history of HCC status post microwave ablation, supposed to get neck cycle of treatment November, and prostate cancer on androgen deprivation therapy, follows with Dr. Dreyer from [facility]  - Oncology consult appreciated    Liver cirrhosis  History of alcohol use disorder  Hyperbilirubinemia, mild  Elevated transaminases, mild  Reports still drinking about 2 drinks a day, has cut down from previously.  Never had alcohol withdrawal before.   - No  CIWA, no significant withdrawal noted this admission  - Patient declined thiamine and folate but agreed to take multivitamin  - [facility] consulted    Parkinson's disease  - PTA Sinemet    Hyponatremia, mild  -Monitor  -Further workup if remains low or decreases further    Chronic anemia  Appears like over the last year hemoglobin has been around 10-12. Hemoglobin at presentation 9.9. Patient denies any active bleed.  But does have evidence of possible hematoma as above  - Monitor for further drop and if remains stable would recommend outpatient follow-up    Type 2 diabetes mellitus  - Hold PTA metformin  - Hemoglobin A1c on 9/18/2024 was 6.3  - SSI with low resistance for now    Hypertension  - Holding PTA atenolol    Clinically Significant Risk Factors         # Hyponatremia: Lowest Na = 128 mmol/L in last 2 days, will monitor as appropriate  # Hypochloremia: Lowest Cl = 93 mmol/L in last 2 days, will monitor as appropriate      # Hypoalbuminemia: Lowest albumin = 2.7 g/dL at 10/25/2024  2:40 PM, will monitor as appropriate    # Coagulation Defect: INR = 1.20 (Ref range: 0.85 - 1.15) and/or PTT = 36 Seconds (Ref range: 22 - 38 Seconds), will monitor for bleeding    # Hypertension: Noted on problem  list                       PT/OT: ordered  Diet: Advance Diet as Tolerated: Regular Diet Adult; Moderate Consistent Carb (60 g CHO per Meal) Diet    DVT Prophylaxis: Pneumatic Compression Devices  Diaz Catheter: Not present  Lines: None     Cardiac Monitoring: None  Code Status: Full Code    Medically Ready for Discharge: Anticipated in 2-4 Days    - Total time spent on encounter is 35  minutes, which includes counseling patient and family, coordination of care, managing orders, interpreting tests, and/or time spent discussing with consultants.      Parveen Sanders MD  Hospitalist Service  [facility]  Securely message with [employer] (more info)  Text page via [employer] Paging/Directory       Data reviewed today: I reviewed all new labs and imaging results over the last 24 hours.    Physical Exam   Temp: 98.6  F (37  C) Temp src: Oral BP: (!) 139/90 Pulse: 92   Resp: 16 SpO2: 92 % O2 Device: None (Room air) Oxygen Delivery: 2 LPM  Vitals:    10/24/24 0912   Weight: 72.1 kg (159 lb)     Vital Signs with Ranges  Temp:  [97.7  F (36.5  C)-98.6  F (37  C)] 98.6  F (37  C)  Pulse:  [] 92  Resp:  [13-26] 16  BP: ()/(57-90) 139/90  SpO2:  [90 %-99 %] 92 %  I/O last 3 completed shifts:  In: -   Out: 185 [Urine:100; Drains:85]  O2 requirements: none    Constitutional: Male in NAD, resting tremor  HEENT: Eyes nonicteric, oral mucosa moist  Cardiovascular: RRR, normal S1/2, no m/r/g  Respiratory: CTAB, no wheezing or crackles  Vascular: No LE pitting edema  GI: Normoactive bowel sounds, nontender, nondistended, epigastric ASHLIE drain with some pus inside  Skin/Integumen: No rashes  Neuro/Psych: Appropriate affect and mood. A&Ox3, moves all extremities    Medications   Current Facility-Administered Medications   Medication Dose Route Frequency Provider Last Rate Last Admin     Current Facility-Administered Medications   Medication Dose Route Frequency Provider Last Rate Last Admin     carbidopa-levodopa (SINEMET)  MG per tablet 1 tablet  1 tablet Oral BID Rhonda Farmer MD   1 tablet at 10/27/24 0819    ceFEPIme (MAXIPIME) 2 g vial to attach to  mL bag for ADULTS or NS 50 mL bag for PEDS  2 g Intravenous Q12H Parveen Sanders MD   2 g at 10/27/24 0213    docusate sodium (COLACE) capsule 100 mg  100 mg Oral BID Rhonda Farmer MD   100 mg at 10/27/24 0819    insulin aspart (NovoLOG) injection (RAPID ACTING)  1-7 Units Subcutaneous TID AC Rhonda Farmer MD   1 Units at 10/24/24 1844    insulin aspart (NovoLOG) injection (RAPID ACTING)  1-5 Units Subcutaneous At Bedtime Rhonda Farmer MD        metroNIDAZOLE (FLAGYL) infusion 500 mg  500 mg Intravenous Q12H Parveen Sanders MD   500 mg at 10/27/24 0217    morphine (PF) injection 3 mg  3 mg Intravenous Once Shelly Garcia MD        multivitamin w/minerals (THERA-VIT-M) tablet 1 tablet  1 tablet Oral Daily Rhonda Farmer MD   1 tablet at 10/27/24 0826    sodium chloride (PF) 0.9% PF flush 10 mL  10 mL Irrigation Q8H Sumit Zapata MD   10 mL at 10/27/24 0827    sodium chloride (PF) 0.9% PF flush 3 mL  3 mL Intracatheter Q8H Rhonda Farmer MD   3 mL at 10/26/24 2105       Data   Recent Labs   Lab 10/27/24  0754 10/26/24  2104 10/26/24  1238 10/25/24  1633 10/25/24  1440 10/24/24  1421 10/24/24  1401 10/24/24  1021   WBC  --   --   --   --  14.4*  --   --  17.4*   HGB  --   --   --   --  9.3*  --   --  9.9*   MCV  --   --   --   --  93  --   --  92   PLT  --   --   --   --  355  --   --  367   INR  --   --   --   --  1.20*  --  1.25*  --    NA  --   --   --   --  128*  --   --  131*   POTASSIUM  --   --   --   --  3.5  --   --  3.9   CHLORIDE  --   --   --   --  93*  --   --  92*   CO2  --   --   --   --  25  --   --  23   BUN  --   --   --   --  11.7  --   --  15.3   CR  --   --   --   --  0.79  --   --  0.85   ANIONGAP  --   --   --   --  10  --   --  16*   JORGE  --   --   --   --  8.1*  --   --  8.4*   * 125* 122*   <  > 129*   < >  --  125*   ALBUMIN  --   --   --   --  2.7*  --   --  3.0*   PROTTOTAL  --   --   --   --  6.3*  --   --  7.2   BILITOTAL  --   --   --   --  1.2  --   --  1.3*   ALKPHOS  --   --   --   --  159*  --   --  168*   ALT  --   --   --   --  5  --   --  16   AST  --   --   --   --  66*  --   --  51*   LIPASE  --   --   --   --   --   --   --  23    < > = values in this interval not displayed.       Imaging:   Recent Results (from the past 24 hours)   IR Peritoneal Abscess Drainage    [facility] RADIOLOGY  LOCATION: [facility]  DATE: 10/26/2024    PROCEDURE: US AND FLUOROSCOPY GUIDED DRAINAGE CATHETER PLACEMENT    INTERVENTIONAL RADIOLOGIST: Sumit Zapata MD.    INDICATION: 73-year-old man with history of hepatocellular carcinoma  status post microwave ablation liver lesions 6/8/2024. Presents with  upper abdominal pain, found to have perihepatic fluid collection  suspected B abscess along the left hepatic lobe. Also has a small  abscess adjacent to the gallbladder. Presents for drainage catheter  placement of the left perihepatic abscesses. Cholecystostomy tube  placement was also discussed, however HIDA scan showed patent cystic  duct and any gallbladder inflammation is probably related to the  adjacent abscess and unlikely to benefit from a cholecystostomy tube.    CONSENT: The risks, benefits and alternatives of imaging guided  abscess drainage catheter placement were discussed with the patient   in detail. All questions were answered. Informed consent was given to  proceed with the procedure.    MODERATE SEDATION: Versed 1 mg IV; Fentanyl 50 mcg IV.  Under  physician supervision, Versed and fentanyl were administered for  moderate sedation. Pulse oximetry, heart rate and blood pressure were  continuously monitored by an independent trained observer. The  physician spent 21 minutes of face-to-face sedation time with the  patient.    CONTRAST: 20 cc into the abscess  ANTIBIOTICS:  None  ADDITIONAL MEDICATIONS: None    FLUOROSCOPIC TIME: 1.6 minutes.  RADIATION DOSE: Air Kerma: 25.4 mGy    COMPLICATIONS: No immediate complications.    STERILE BARRIER TECHNIQUE: Maximum sterile barrier technique was used.  Cutaneous antisepsis was performed at the operative site with  application of 2% chlorhexidine and large sterile drape. Prior to the  procedure, the  and assistant performed hand hygiene and wore  hat, mask, sterile gown, and sterile gloves during the entire  procedure.    PROCEDURE: A limited ultrasound of the upper abdomen demonstrate  hypoechoic fluid collection adjacent to the left hepatic lobe. This  was targeted for drainage catheter placement.    The overlying skin and subcutaneous soft tissues were anesthetized  using 1% lidocaine. Under US guidance a 5 Cypriot Year Upeh needle was  inserted into the fluid collection. Contrast was injected through the  needle to outline the fluid collection. A wire was advanced and coiled  within the fluid collection. Following tract dilation, a 12 Cypriot  drainage catheter was advanced into the collection and pigtail  catheter loop was formed. Catheter was secured to the skin..   10 mL  of purulent fluid was removed and sent for culture and sensitivity.  Contrast was injected through the drain and sinogram was performed  which demonstrated the loop of the catheter to be within the fluid  collection. No immediate complications.    FINDINGS:  12 Fr drain placed into left perihepatic abscess. Drain injection  showed the left suzy-hepatic abscesses appeared to communicate with  each other.       Impression    IMPRESSION:    1. Successful ultrasound and fluoroscopic guided perihepatic abscess  drain placement using a 12F locking pigtail catheter.  2. 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 There  appears to be a thin fistulous tract between the abscess adjacent  to  the lower left hepatic lobe and the stomach through segment 3 of the  liver, most evident on CT. The segment 3 liver lesion which was  ablated in July was directly adjacent to the stomach at that time.     PLAN:  1. Patient to be recovered on the floor.  2. Left perihepatic drain to bulb suction.  3. Flush with 10 mL NS tid.  4. Follow-up with IR once drain outputs are < 10 mL/day x 2 days with  CT w/ contrast of the abdomen/pelvis and drain sinogram.     Sumit Zapata MD         SYSTEM ID:  [id number]

## 2024-10-27 NOTE — PROGRESS NOTES
Oncology Chart Check    See Note from 10/26   Possibility of gastro hepatic fistula causing perihepatic abscess   Follow up cultures and drain output     Patrick Dreyer, DO  [facility]

## 2024-10-27 NOTE — PROGRESS NOTES
"  Interventional Radiology - Progress Note  Inpatient - [facility]  10/27/2024     S:  Pt with h/o HCC, s/p microwave ablation in 6/8/24, new perihepatic fluid collection concerning for infection. S/p drain placement by IR on 10/26/24. The drain appears to be intact. Prelim cultures are positive. Abx are onboard. Pt is afebrile. Surgery and medicine are following.        O:  BP (!) 139/90 (BP Location: Left arm)   Pulse 92   Temp 98.6  F (37  C) (Oral)   Resp 16   Ht 1.753 m (5' 9\")   Wt 72.1 kg (159 lb)   SpO2 92%   BMI 23.48 kg/m    General: Stable. In no acute distress.    Neuro: Alert and oriented x 3. No focal deficits.  Psych: Appropriate mood and affect. Linear/coherent thought process.   Resp: Normal respirations. Lungs clear to auscultation bilaterally.  Cardio: S1S2, regular rate and rhythm, without murmur, clicks or rubs  Abdomen: Soft, non-distended, non-tender. RUQ drain is intact with purulent OP to ASHLIE; dressing C/D/I.   Extremities: without edema.  Skin:  normal   MSK: No gross motor weakness. Moves all extremities equally. Sensation intact. Proprioception intact.      IMAGING:  IR Drain placement  IMPRESSION:    1. Successful ultrasound and fluoroscopic guided perihepatic abscess  drain placement using a 12F locking pigtail catheter.  2. 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 There  appears to be a thin fistulous tract between the abscess adjacent to  the lower left hepatic lobe and the stomach through segment 3 of the  liver, most evident on CT. The segment 3 liver lesion which was  ablated in July was directly adjacent to the stomach at that time.      PLAN:  1. Patient to be recovered on the floor.  2. Left perihepatic drain to bulb suction.  3. Flush with 10 mL NS tid.  4. Follow-up with IR once drain outputs are < 10 mL/day x 2 days with  CT w/ contrast of the abdomen/pelvis " and drain sinogram.     LABS:  Recent Labs   Lab 10/25/24  1440 10/24/24  1401 10/24/24  1021   WBC 14.4*  --  17.4*   HGB 9.3*  --  9.9*     --  367   INR 1.20* 1.25*  --    CR 0.79  --  0.85   BILITOTAL 1.2  --  1.3*   ALKPHOS 159*  --  168*   AST 66*  --  51*   ALT 5  --  16       Culture (prelim):    2+ Gram negative bacilli   4+ WBC seen   Predominantly PMNs     Antibiotic: cefeplme; flagyl   Flushing: 10 ml q8H    Drain Outputs (in mL):  10/26 85       A:  73 year old male with perihepatic abscess, s/p RUQ drain placement. The drain appears to be intact with purulent discharge to ASHLIE bulb. IV abx are on board. Surgery and Medicine are following.     P:    - Continue to follow Cultures, drain OPs and patient's clinical signs/symptoms for improvement.   - Continue present drain cares. Continue drain to ASHLIE drainage. Flush drain as ordered.  - Antibiotics per treatment team.   - Anticipate that patient will likely discharge with drain in place.  - Drain care education provided to patient. All questions answered.   - Drain discharge instructions entered into D/C navigator.  - Patient to flush drain with 10 mL NS daily upon discharge. NS flushes ordered in D/C navigator.  - Will continue to follow patient's clinical status, imaging, drain(s) function and drain(s) OPs to determine drain follow up plan. Anticipating follow-up CT/abscessogram approximately 7-14 days from drain placement date, once drain outputs are <20mL/day for a consecutive 2 days, and/or pending patient's clinical status and drain function.   - Drain follow up can occur as inpatient or outpatient, most often the first drain check will occur as outpatient. Outpatient drain follow up orders have been entered.   - IR will continue to follow loosely. Please contact IR with drain related questions or concerns.        Total time spent on the date of the encounter: 25 minutes.    Pk Mario PA-C  Interventional Radiology  [phone]

## 2024-10-27 NOTE — DISCHARGE INSTRUCTIONS
Drain Placement Discharge Instructions:  You had a small tube or drain(s) placed. This was placed so the abnormal fluid collection within your body can be drained out (externally). Sometimes drains must stay in place for weeks to months. Please follow the below instructions as you recover:    Care Instructions after drain placement:  - Rest after your drain was placed. Avoid strenuous activity and heavy lifting for the next 2 days. Return to your normal activities as you tolerate after the 2 day restriction.  - You may shower; however, you should not submerge site under water like in a tub bath, Jacuzzi or pool. Keep drain exit site covered with plastic wrap and tape when showering.  - Keep dressing clean and dry as long as drain is in place. If you have a gauze dressing, please change the dressing daily and as needed to keep site clean and dry.  - You may eat and drink as normal.  - You may have discomfort after the procedure near the drain exit site. You may take acetaminophen (Tylenol ) or ibuprofen (Motrin ) as needed for any discomfort.  - Protect the drain from being pulled out or dislodged.  - Inspect the tube often for kinks.  - If you have a gravity bag, keep the bag below the drain exit site to allow for free flow of drainage by gravity.  - Flush your drainage tube with 10mL sterile normal saline daily.  - Record your daily drain outputs and amount flushed on your drainage record. Bring your records to your next radiology appointment. Often drains will need to stay in place until the drainage output is less than about 15mL a day for 2-3 days in a row.  - Empty your drainage bag/bulb daily or when it is approximately half way fluid. Follow below instructions for emptying your bag/bulb:       - Clean hands well with soap and water.       - Place a measuring container near the outlet valve of the drainage bag/bulb.       - If you have a drainage BULB: Open the bulb cap (at the top of the bulb). Empty the  fluid into the measuring cup. Squeeze bulb and hold flat. While bulb is squeezed, close the cap.       - After draining fluid record your drainage output.        - Discard drainage into toilet once fluid drained.       If you have a Y flushing port / UreSil Flush Adapter:    1. Collect flushing supplies: 10 mL of sterile normal saline in syringe, alcohol pad.  2. Clamp off the tubing to the drain by pinching the white clamp closed. Clean the drain port with an alcohol wipe.   3. Attach the saline syringe to the drain port.   4. Twist the syringe (clockwise) to tighten it to the port   5. Flush sterile normal saline from the syringe into the drain. The saline should flow toward your body not toward the drainage bag.   6. Untwist the syringe (counter clockwise) and remove it.   7. Unclamp the white clamp making sure the drainage is able to flow freely into the bag.   8. Squeeze bulb or accordion to reapply suction if you have one.  9. Record the output from your drain and flush amount on your drainage record.    Follow-Up:   - Please contact [facility] Outpatient Scheduling at [phone].    Please seek medical evaluation for:  - Nausea and/or vomiting   - Diffuse abdominal pain  - Fevers (greater than 101 F)  - Drainage tube falls out, is pulled back or felt to be out of position.     Call [employer] IR RN Line at [phone] with tube related questions or concerns:  - Unable to flush drainage tube.   - Leakage (or purulent drainage) around drainage tube site.   - Extreme pain at drainage tube site.   - Outputs suddenly stop or significantly reduces.   - Warmth, redness, swelling or tenderness around the drainage tube

## 2024-10-28 ENCOUNTER — APPOINTMENT (OUTPATIENT)
Dept: OCCUPATIONAL THERAPY | Facility: CLINIC | Age: 74
DRG: 391 | End: 2024-10-28
Payer: COMMERCIAL

## 2024-10-28 ENCOUNTER — APPOINTMENT (OUTPATIENT)
Dept: GENERAL RADIOLOGY | Facility: CLINIC | Age: 74
DRG: 391 | End: 2024-10-28
Attending: INTERNAL MEDICINE
Payer: COMMERCIAL

## 2024-10-28 LAB
ANION GAP SERPL CALCULATED.3IONS-SCNC: 12 MMOL/L (ref 7–15)
BACTERIA ABSC ANAEROBE+AEROBE CULT: ABNORMAL
BUN SERPL-MCNC: 9.7 MG/DL (ref 8–23)
CALCIUM SERPL-MCNC: 8 MG/DL (ref 8.8–10.4)
CHLORIDE SERPL-SCNC: 92 MMOL/L (ref 98–107)
CREAT SERPL-MCNC: 0.67 MG/DL (ref 0.67–1.17)
EGFRCR SERPLBLD CKD-EPI 2021: >90 ML/MIN/1.73M2
ERYTHROCYTE [DISTWIDTH] IN BLOOD BY AUTOMATED COUNT: 14.6 % (ref 10–15)
GLUCOSE BLDC GLUCOMTR-MCNC: 109 MG/DL (ref 70–99)
GLUCOSE BLDC GLUCOMTR-MCNC: 116 MG/DL (ref 70–99)
GLUCOSE SERPL-MCNC: 124 MG/DL (ref 70–99)
GRAM STAIN RESULT: ABNORMAL
GRAM STAIN RESULT: ABNORMAL
HCO3 SERPL-SCNC: 24 MMOL/L (ref 22–29)
HCT VFR BLD AUTO: 27.7 % (ref 40–53)
HGB BLD-MCNC: 9.3 G/DL (ref 13.3–17.7)
MCH RBC QN AUTO: 31.1 PG (ref 26.5–33)
MCHC RBC AUTO-ENTMCNC: 33.6 G/DL (ref 31.5–36.5)
MCV RBC AUTO: 93 FL (ref 78–100)
PLATELET # BLD AUTO: 375 10E3/UL (ref 150–450)
POTASSIUM SERPL-SCNC: 3.7 MMOL/L (ref 3.4–5.3)
RBC # BLD AUTO: 2.99 10E6/UL (ref 4.4–5.9)
SODIUM SERPL-SCNC: 128 MMOL/L (ref 135–145)
WBC # BLD AUTO: 12.5 10E3/UL (ref 4–11)

## 2024-10-28 PROCEDURE — 99232 SBSQ HOSP IP/OBS MODERATE 35: CPT | Performed by: PHYSICIAN ASSISTANT

## 2024-10-28 PROCEDURE — 99232 SBSQ HOSP IP/OBS MODERATE 35: CPT | Performed by: INTERNAL MEDICINE

## 2024-10-28 PROCEDURE — 250N000013 HC RX MED GY IP 250 OP 250 PS 637: Performed by: INTERNAL MEDICINE

## 2024-10-28 PROCEDURE — 97535 SELF CARE MNGMENT TRAINING: CPT | Mod: GO | Performed by: OCCUPATIONAL THERAPIST

## 2024-10-28 PROCEDURE — 120N000001 HC R&B MED SURG/OB

## 2024-10-28 PROCEDURE — 250N000011 HC RX IP 250 OP 636: Performed by: INTERNAL MEDICINE

## 2024-10-28 PROCEDURE — 80048 BASIC METABOLIC PNL TOTAL CA: CPT | Performed by: INTERNAL MEDICINE

## 2024-10-28 PROCEDURE — 36415 COLL VENOUS BLD VENIPUNCTURE: CPT | Performed by: INTERNAL MEDICINE

## 2024-10-28 PROCEDURE — 74019 RADEX ABDOMEN 2 VIEWS: CPT

## 2024-10-28 PROCEDURE — 85014 HEMATOCRIT: CPT | Performed by: INTERNAL MEDICINE

## 2024-10-28 RX ORDER — METHOCARBAMOL 750 MG/1
750 TABLET, FILM COATED ORAL 4 TIMES DAILY
Status: DISCONTINUED | OUTPATIENT
Start: 2024-10-28 | End: 2024-11-01 | Stop reason: HOSPADM

## 2024-10-28 RX ADMIN — TRAZODONE HYDROCHLORIDE 100 MG: 100 TABLET ORAL at 21:04

## 2024-10-28 RX ADMIN — DOCUSATE SODIUM 100 MG: 100 CAPSULE, LIQUID FILLED ORAL at 21:04

## 2024-10-28 RX ADMIN — METHOCARBAMOL 750 MG: 750 TABLET ORAL at 18:28

## 2024-10-28 RX ADMIN — DOCUSATE SODIUM 100 MG: 100 CAPSULE, LIQUID FILLED ORAL at 10:09

## 2024-10-28 RX ADMIN — CEFEPIME 2 G: 2 INJECTION, POWDER, FOR SOLUTION INTRAVENOUS at 01:24

## 2024-10-28 RX ADMIN — METHOCARBAMOL 750 MG: 750 TABLET ORAL at 13:36

## 2024-10-28 RX ADMIN — CEFEPIME 2 G: 2 INJECTION, POWDER, FOR SOLUTION INTRAVENOUS at 14:04

## 2024-10-28 RX ADMIN — CARBIDOPA AND LEVODOPA 1 TABLET: 25; 250 TABLET ORAL at 10:09

## 2024-10-28 RX ADMIN — METRONIDAZOLE 500 MG: 500 INJECTION, SOLUTION INTRAVENOUS at 14:56

## 2024-10-28 RX ADMIN — METHOCARBAMOL 750 MG: 750 TABLET ORAL at 21:04

## 2024-10-28 RX ADMIN — METRONIDAZOLE 500 MG: 500 INJECTION, SOLUTION INTRAVENOUS at 02:26

## 2024-10-28 RX ADMIN — Medication 1 TABLET: at 10:09

## 2024-10-28 RX ADMIN — METHOCARBAMOL 750 MG: 750 TABLET ORAL at 10:09

## 2024-10-28 NOTE — PROGRESS NOTES
"[facility]    Hospitalist Progress Note    Interval History   - Reports sharp bouts of abdominal pain around his catheter site, lasting a few seconds, related to movement. Could be muscle spasms. Discussed with IR they will see patient.    Assessment & Plan   Summary: Lamine Samano is a 73 year old male with PMH hepatocellular carcinoma on microwave ablation, Parkinson's disease, alcohol use disorder, history of prostate cancer on androgen deprivation therapy, hypertension, type 2 diabetes mellitus, cirrhosis of liver, who was admitted on 10/24/2024 with abdominal pain and liver lesions of unclear etiology.    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 No fever, nausea vomiting or diarrhea, note leukocytosis to 17k on admission. CT abdomen pelvis-multiple subcapsular fluid collection with surrounding inflammatory changes, posttraumatic collection of hemorrhage versus abscess.  Multiple liver lesions, possible HCC.  Mild gallbladder wall thickening and pericholecystic fluid, could be reactive from adjacent inflammation along the liver, segmental wall thickening along the ascending and proximal transverse colon at the hepatic flexure, possible colitis which may be reactive from adjacent inflammation along the liver. Follow up liver MRI shows hepatic metastases as well as a \"Subcapsular/subdiaphragmatic complex fluid collection along the anterior aspect left hepatic lobe extending to the third metastasis and left hepatic lobe inferiorly... This likely represents a subdiaphragmatic abscess.\" HIDA scan suspicious for chronic cholecystitis. On exam he has liver enlargement and epigastric tenderness over area of liver lesions.   Seen by IR on 10/26, chronic cholecystitis felt to be unlikely from HIDA scan. Patient underwent drainage of liver lesion with " pus return and confirmed to be liver abscess. 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   Patient with sharp abdominal pain 10/28 related to movement, position. Abd XR shows no acute pathology.  - Continue Cefepime and Flagyl  - Appreciate ID consult   - Pending discharge ID recs  - Appreciate Surgery consult  - Appreciate IR consult   - S/p liver drain 10/26 in place  - Await abscess cultures and sensitivities, currently appears polymicrobial  - Pain control with p.o. oxycodone/IV Dilaudid    Hepatocellular carcinoma  History of prostate cancer  Known history of HCC status post microwave ablation, supposed to get neck cycle of treatment November, and prostate cancer on androgen deprivation therapy, follows with Dr. Dreyer from [facility]  - Oncology consult appreciated    Liver cirrhosis  History of alcohol use disorder  Hyperbilirubinemia, mild  Elevated transaminases, mild  Reports still drinking about 2 drinks a day, has cut down from previously.  Never had alcohol withdrawal before.   - No  CIWA, no significant withdrawal noted this admission  - Patient declined thiamine and folate but agreed to take multivitamin  - [facility] consulted    Parkinson's disease  - PTA Sinemet    Hyponatremia, mild  Na 128. Patient appears euvolemic.  -Further workup if remains low or decreases further    Chronic anemia  Appears like over the last year hemoglobin has been around 10-12. Hemoglobin at presentation 9.9. Patient denies any active bleed.  But does have evidence of possible hematoma as above  - Monitor for further drop and if remains stable would recommend outpatient follow-up    Type 2 diabetes mellitus  - Hold PTA metformin  - Hemoglobin A1c on 9/18/2024 was 6.3  - SSI with low resistance for now    Hypertension  - Holding PTA atenolol    Clinically Significant Risk Factors               # Hypoalbuminemia: Lowest albumin = 2.7 g/dL at 10/25/2024  2:40 PM, will monitor as  appropriate       # Hypertension: Noted on problem list                       PT/OT: ordered  Diet: Advance Diet as Tolerated: Regular Diet Adult; Moderate Consistent Carb (60 g CHO per Meal) Diet    DVT Prophylaxis: Pneumatic Compression Devices  Diaz Catheter: Not present  Lines: None     Cardiac Monitoring: None  Code Status: Full Code    Medically Ready for Discharge: Anticipated Tomorrow to home    - Total time spent on encounter is 35 minutes, which includes counseling patient and family, coordination of care, managing orders, interpreting tests, and/or time spent discussing with consultants.      Parveen Sanders MD  Hospitalist Service  [facility]  Securely message with Vocera (more info)  Text page via [employer] Paging/Directory       Data reviewed today: I reviewed all new labs and imaging results over the last 24 hours.    Physical Exam   Temp: 99.1  F (37.3  C) Temp src: Oral BP: 138/75 Pulse: 88   Resp: 16 SpO2: 92 % O2 Device: None (Room air)    Vitals:    10/24/24 0912   Weight: 72.1 kg (159 lb)     Vital Signs with Ranges  Temp:  [98.5  F (36.9  C)-99.1  F (37.3  C)] 99.1  F (37.3  C)  Pulse:  [] 88  Resp:  [16] 16  BP: ()/(73-75) 138/75  SpO2:  [91 %-92 %] 92 %  I/O last 3 completed shifts:  In: 300 [P.O.:300]  Out: 725 [Urine:675; Drains:50]  O2 requirements: none    Constitutional: Male in NAD, resting tremor  HEENT: Eyes nonicteric, oral mucosa moist  Cardiovascular: RRR, normal S1/2, no m/r/g  Respiratory: CTAB, no wheezing or crackles  Vascular: No LE pitting edema  GI: Normoactive bowel sounds, nontender, nondistended, epigastric ASHLIE drain with some pus inside  Skin/Integumen: No rashes  Neuro/Psych: Appropriate affect and mood. A&Ox3, moves all extremities    Medications   Current Facility-Administered Medications   Medication Dose Route Frequency Provider Last Rate Last Admin     Current Facility-Administered Medications   Medication Dose Route Frequency  Provider Last Rate Last Admin    carbidopa-levodopa (SINEMET)  MG per tablet 1 tablet  1 tablet Oral BID Rhonda Farmer MD   1 tablet at 10/27/24 0819    ceFEPIme (MAXIPIME) 2 g vial to attach to  mL bag for ADULTS or NS 50 mL bag for PEDS  2 g Intravenous Q12H Parveen Sanders MD   2 g at 10/28/24 0124    docusate sodium (COLACE) capsule 100 mg  100 mg Oral BID Rhonda Farmer MD   100 mg at 10/27/24 2112    methocarbamol (ROBAXIN) tablet 750 mg  750 mg Oral 4x Daily Parveen Sanders MD        metroNIDAZOLE (FLAGYL) infusion 500 mg  500 mg Intravenous Q12H Parveen Sanders MD   500 mg at 10/28/24 0226    morphine (PF) injection 3 mg  3 mg Intravenous Once Shelly Garcia MD        multivitamin w/minerals (THERA-VIT-M) tablet 1 tablet  1 tablet Oral Daily Rhonda Farmer MD   1 tablet at 10/27/24 0826    sodium chloride (PF) 0.9% PF flush 10 mL  10 mL Irrigation Q8H Sumit Zapata MD   10 mL at 10/28/24 0124    sodium chloride (PF) 0.9% PF flush 3 mL  3 mL Intracatheter Q8H Rhonda Farmer MD   3 mL at 10/28/24 0650       Data   Recent Labs   Lab 10/28/24  0814 10/28/24  0212 10/27/24  2118 10/25/24  1633 10/25/24  1440 10/24/24  1421 10/24/24  1401 10/24/24  1021   WBC  --   --   --   --  14.4*  --   --  17.4*   HGB  --   --   --   --  9.3*  --   --  9.9*   MCV  --   --   --   --  93  --   --  92   PLT  --   --   --   --  355  --   --  367   INR  --   --   --   --  1.20*  --  1.25*  --    NA  --   --   --   --  128*  --   --  131*   POTASSIUM  --   --   --   --  3.5  --   --  3.9   CHLORIDE  --   --   --   --  93*  --   --  92*   CO2  --   --   --   --  25  --   --  23   BUN  --   --   --   --  11.7  --   --  15.3   CR  --   --   --   --  0.79  --   --  0.85   ANIONGAP  --   --   --   --  10  --   --  16*   JORGE  --   --   --   --  8.1*  --   --  8.4*   * 109* 147*   < > 129*   < >  --  125*   ALBUMIN  --   --   --   --  2.7*  --   --  3.0*   PROTTOTAL  --   --   --   --  6.3*  --    --  7.2   BILITOTAL  --   --   --   --  1.2  --   --  1.3*   ALKPHOS  --   --   --   --  159*  --   --  168*   ALT  --   --   --   --  5  --   --  16   AST  --   --   --   --  66*  --   --  51*   LIPASE  --   --   --   --   --   --   --  23    < > = values in this interval not displayed.       Imaging:   Recent Results (from the past 24 hours)   XR Abdomen 2 Views    Narrative    ABDOMEN TWO-THREE VIEW  10/28/2024 9:42 AM     HISTORY: Sharp abdominal pain post drain.    COMPARISON: MR Liver 10/24/2024. CT abdomen and pelvis 10/24/2024.      Impression    IMPRESSION: Percutaneous drain projects over the mid upper abdomen.  Nonobstructive bowel gas pattern. Small amount of stool throughout the  colon. Limited evaluation for free air given supine technique.  Degenerative changes of the spine.

## 2024-10-28 NOTE — PROGRESS NOTES
[facility]    Infectious Disease Progress Note    Date of Service : 10/28/2024     Assessment:  73YM with hx of hepatic cirrhosis complicated by hepatocellular carcinoma- receiving microwave therapy-last session 3 months ago, who has been hospitalized with abdominal pain following a fall on 10/16, and  Ct findings of multiple  subcapsular fluid collections which seem to be consistent with hematomas given drop in Hb from 11.8 (on 10/16) to 9.3g.dl on admission. MRI however shows Subcapsular/subdiaphragmatic complex fluid collection consistent with abscess     -S/p fall on 10/16 with abdominal pain related to multiple subcapsular hematomas / liver abscess s/p drain placement 10/26 - cultures thus far with E.coli, Klebsiella, and morganella  -Acute blood loss anemia related to above  -Leukocytosis is likely reactive   -Possible colitis on imaging, likely reactive. No clinical symptoms of infectious colitis  -Hepatocellular carcinoma, receiving microwave therapy  -Liver cirrhosis with mildly elevated liver enzymes  -Hyponatremia  -Parkinson's disease  -diabetes  -Chronic medical conditions - Htn, chronic anemia, hx of prostate cancer     Recommendations:  Following liver abscess culture.   Continue Ceftriaxone and Flagyl pending final culture results.   IR managing drain.    Patient and plan discussed with Dr. Casas.     Evy Sandoval PA-C    Interval History   Has remained afebrile, no new complaints.      Physical Exam   Temp: 99.6  F (37.6  C) Temp src: Oral BP: 115/63 Pulse: 92   Resp: 18 SpO2: 91 % O2 Device: None (Room air)    Vitals:    10/24/24 0912   Weight: 72.1 kg (159 lb)     Vital Signs with Ranges  Temp:  [99.1  F (37.3  C)-99.6  F (37.6  C)] 99.6  F (37.6  C)  Pulse:  [88-92] 92  Resp:  [16-18] 18  BP: (115-138)/(63-75) 115/63  SpO2:  [91 %-92 %] 91 %    GENERAL APPEARANCE:  awake, chronically ill appearing male  EYES: Eyes grossly normal to inspection  RESP: lungs clear   CV:  S1S2, irregular  ABDOMEN: epigastric and RUQ tenderness. ASHLIE drain in place with minimal output.   SKIN: no suspicious lesions or rashes    Other:    Medications   Current Facility-Administered Medications   Medication Dose Route Frequency Provider Last Rate Last Admin     Current Facility-Administered Medications   Medication Dose Route Frequency Provider Last Rate Last Admin    carbidopa-levodopa (SINEMET)  MG per tablet 1 tablet  1 tablet Oral BID Rhonda Farmer MD   1 tablet at 10/28/24 1009    ceFEPIme (MAXIPIME) 2 g vial to attach to  mL bag for ADULTS or NS 50 mL bag for PEDS  2 g Intravenous Q12H Parveen Sanders MD   2 g at 10/28/24 1404    docusate sodium (COLACE) capsule 100 mg  100 mg Oral BID Rhonda Farmer MD   100 mg at 10/28/24 1009    methocarbamol (ROBAXIN) tablet 750 mg  750 mg Oral 4x Daily Parveen Sanders MD   750 mg at 10/28/24 1336    metroNIDAZOLE (FLAGYL) infusion 500 mg  500 mg Intravenous Q12H Parveen Sanders MD   500 mg at 10/28/24 1456    multivitamin w/minerals (THERA-VIT-M) tablet 1 tablet  1 tablet Oral Daily Rhonda Farmer MD   1 tablet at 10/28/24 1009    sodium chloride (PF) 0.9% PF flush 10 mL  10 mL Irrigation Q8H Sumit Zapata MD   10 mL at 10/28/24 1604    sodium chloride (PF) 0.9% PF flush 3 mL  3 mL Intracatheter Q8H Rhonda Farmer MD   3 mL at 10/28/24 0650       Data   All microbiology laboratory data reviewed.  Recent Labs   Lab Test 10/28/24  1148 10/25/24  1440 10/24/24  1021   WBC 12.5* 14.4* 17.4*   HGB 9.3* 9.3* 9.9*   HCT 27.7* 27.8* 29.7*   MCV 93 93 92    355 367     Recent Labs   Lab Test 10/28/24  1148 10/25/24  1440 10/24/24  1021   CR 0.67 0.79 0.85     Microbiology  10/26/2024 1211 10/28/2024 1309 Abscess Aerobic Bacterial Culture Routine With Gram Stain [[id number]]   (Abnormal)   Abscess from Abdomen    Preliminary result Component Value   Culture 3+ Morganella morganii Abnormal  P    3+ Escherichia coli Abnormal  P     2+ Klebsiella oxytoca Abnormal  P   Gram Stain Result 2+ Gram negative bacilli Abnormal  P    4+ WBC seen Abnormal  P    Predominantly PMNs             10/26/2024 1210 10/27/2024 1646 Anaerobic bacterial culture [[id number]]   Abscess from Abdomen    Preliminary result Component Value   Culture No anaerobic organisms isolated after 1 day P             10/24/2024 1303 10/28/2024 1531 Blood Culture Peripheral Blood [[id number]]   Peripheral Blood    Preliminary result Component Value   Culture No growth after 4 days P                 Imaging  EXAM: MR LIVER W/O and W CONTRAST  LOCATION: [facility]  DATE: 10/24/2024     INDICATION: History of liver cancer, s p ablation, liver lesions, also recent trauma hematoma vs abscess  COMPARISON: CT 10/24/2024  TECHNIQUE: Routine MRI liver protocol including T1 in/out phase, diffusion, multiplane T2, and dynamic T1 with IV contrast.    CONTRAST: 7mL Gadavist     FINDINGS:      LIVER: Post ablative changes involving the 3 hepatic metastasis. This results in absence of T2 signal abnormality at the prior metastatic sites with precontrast imaging demonstrating varying degrees of T1 signal indicating internal residual blood   products from the procedure. No evidence for significant enhancement involving the hepatic metastasis. 0.9 cm focus of enhancement involving the far superior aspect of the right hepatic lobe anteriorly (series 12, image 89). Subdiaphragmatic curvilinear   fluid collection over the anterior aspect of the left hepatic lobe extending down to the level of the postoperative change and measuring at maximal diameters of 5.4 x 2.2 x 4.8 cm (4/19 and 3/8). Adjacent inflammatory change in the subcapsular soft   tissues. No calcified gallstones or biliary dilatation. Mild diffuse fatty infiltration.     ADDITIONAL FINDINGS: Pancreas, spleen and both adrenal glands unremarkable. Symmetric renal enhancement. Bilateral renal cysts. Normal caliber  aorta. No lymphadenopathy.                                                                      IMPRESSION:  1.  Post ablative changes of underlying 3 hepatic metastasis which demonstrate no significant T2 signal abnormality which is resolved with new T1 signal internally indicating hemorrhagic components and no significant enhancement.     2.  Subcapsular/subdiaphragmatic complex fluid collection along the anterior aspect left hepatic lobe extending to the third metastasis and left hepatic lobe inferiorly. Dimensions as detailed above. 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   Continued attention to this on follow-up is recommended.     3.  A 0.9 cm focus of enhancement involving the anterosuperior aspect of the right hepatic lobe in the extreme subdiaphragmatic space of the right hepatic lobe. Attention to this on follow-up is recommended to assess for possible hepatic metastasis at   this location.

## 2024-10-28 NOTE — PLAN OF CARE
Goal Outcome Evaluation:       Alert and oriented. Vitals are with normal limits. Reports pain on right heel. It appears with blanchable redness. Suspend heels on a pillow to prevent pressure injury. Constipated for a week. On colace. Patient not ready to escalate measures . Appetite better this afternoon. Continue on cefepime and metronidazole for liver abscess. Right ASHLIE drain about 50 ml output this afternoon.

## 2024-10-28 NOTE — PROGRESS NOTES
[facility]    Hematology / Oncology Progress Note     Primary oncologist: Dr Patrick Dreyer    Date of Admission:  10/24/2024    Assessment & Plan     1.  Hepatocellular carcinoma multifocal treated with microwave ablation on June 6, 2024 3 lesion treated as detailed below.    2.  Admission to the hospital with abdominal pain and additional areas of subcapsular fluid collection s/p aspiration which is consistent with abscess     3.  Leukocytosis, likely reactive concerning for infection but can also be due to hemorrhage and malignancy.    4.  Anemia likely associated with malignancy and/or bleeding.    5.  Prostate cancer Marsha 8 diagnosed in December 2023. He is treated with hormone therapy ADT by urology, no evidence of metastatic disease.    6.  Mild prolongation of INR 1.25 and bilirubin 1.3.    7.  Concern for cholelithiasis and colitis on CT both may be reactive to inflammatory changes from the abscess     Discussion and recommendations:  [facility]. The new lesion of concern in the R hepatic lobe has been present for at least several months and has not substantially changed. We will continue with previously scheduled follow up. Reviewed this information with patient who was happy to hear the good news. We will sign off the case. Please reconsult as needed.       Interval history: Doing well. Culture grew several bacteria. Pending fistulogram.       Patrick Dreyer, DO    Code Status    Full Code    Reason for Consult   Reason for consult: Hepatocellular carcinoma    Primary Care Physician   DELANEY FAM    Chief Complaint     Abdominal pain    Medical records reviewed, history obtained, patient examined.      History of Present Illness   Lamine Samano is a 73 year old male with a history of parkinson's disease, hyperlipidemia, hepatocellular carcinoma who presents with abdominal pain. He sustained a fall into his bathtub a week ago and had  persistent pain in the middle of the abdomen after hitting his abdomen during the fall. Patient was advised by his primary care provider to present to the emergency department due to his lab testing and fall.     He denies any fever, no diarrhea, he was constipated for few days and took stool softeners.    He gets hormone treatment through urology presumably leuprolide last dose was in August which is a 4-month dose over the next planned in December.He tolerates the treatment but he does have hot flashes.  He denies any bleeding or ecchymoses.    CT abdomen and pelvis 10/24/24 Obtained on admission:  HEPATOBILIARY: Diffuse hepatic steatosis is present with nodular   contour compatible with cirrhosis. Enlarging mass is seen within the   right hepatic lobe measuring 2.2 cm compared with the prior   measurement of approximately 1.2 cm (series 3, image 23). Another 2.7   cm hypoattenuating lesion along the anterior right hepatic lobe   previously measured 1.7 cm (series 3, image 25) and may reflect an   ablation cavity from recent microwave ablation. 2.2 x 1.9 cm   ill-defined mass in the left hepatic lobe appears more heterogenous   and decreased from the prior measurement of 2.6 x 2.5 cm (series 3,   image 21). Multiple new subcapsular fluid collections are seen along   the anterior surface of the liver. The largest collection measures up   to 5.5 x 2.8 cm along the anterior left hepatic lobe (series 3, image   20). There are at least 4 other collections seen measuring 5.2 x 2.6   cm, 2.9 x 2.3 cm, 3.3 x 3.5 cm and 4.0 x 1.1 cm (series 3, images 11,   14, 22 and 29). Moderate surrounding fat stranding and soft tissue   thickening is also seen. Air and gas containing collection measuring   3.3 x 1.5 cm is noted along the inferior right hepatic lobe adjacent   to the inflamed hepatic flexure of the colon described below (series   5, image 42). Gallbladder appears mildly distended measuring up to 3.5   cm in diameter  with wall thickening and surrounding fluid.   Impression:  1.  Cirrhotic appearance of the liver as described above. Multiple   subcapsular fluid collections are seen along the anterior portion of   the liver with moderate surrounding inflammatory changes. Findings may   reflect posttraumatic collections with hemorrhage given history of   recent trauma. Abscess collections are also in the differential   diagnosis. At least one collection along the inferior right hepatic   lobe has internal locule of gas as well as peripheral enhancement   suspicious for an abscess.   2.  Multiple liver lesions are present including an enlarging 2.2 cm   mass in the right hepatic lobe suspicious for malignancy.   Characterization of these lesions is limited due to single phase exam   however. A 2.2 cm lesion in the left hepatic lobe appears more   heterogenous and decreased in size from the prior exam. Furthermore,   another 2.7 cm hypoattenuating lesion in the right hepatic lobe   appears increased in size but more fluidlike in appearance. Both of   these lesions may reflect ablation cavities from recent microwave   ablation for known hepatocellular carcinoma. Recommend further   characterization of these findings with follow-up MRI liver protocol   exam.   3. Mild gallbladder wall thickening and pericholecystic fluid.   Findings may be reactive from adjacent inflammation along the liver   described above. Cholecystitis however cannot be excluded. Suggest   correlation with laboratory values and consider follow-up abdominal   ultrasound.   4. Segmental wall thickening is seen along the ascending and proximal   transverse colon at the hepatic flexure. Findings are suggestive of   colitis which may be reactive from adjacent inflammation along the   liver. Infectious versus infiltrate colitis cannot be excluded. An   underlying colonic mass is considered to be less likely but cannot be   excluded given the focal area of wall  thickening. If further concern,   follow-up colonoscopy can be considered after acute symptoms resolve.     He had microwave ablation of the liver on June 6, 2024 to 3 sites of multifocal hepatocellular carcinoma, the largest lesion in segment 3 and the other 2 lesions segment 5 and segment 4B      MRI on August 30, 2024 showed ablation defects identified in segment 3, 4B and 5 of the liver.  1.6 cm LR for lesion segment 8 follow-up in 3 months suggested.      Visit summary from [facility] July 25, 2024 by Dr. Dreyer:    Assessment  1. Hepatocellular carcinoma  - 3/1/24 MRI liver with 3 lesions in the bilateral lobes measuring up to 2.6cm   - With background cirrhosis   - PSMA scan identified the liver lesions with variable activity   - s/p microwave ablation 6/6/24 with Dr. Gonsalez    - Has follow up MRI scheduled at [facility] on 12/3   - Will have his IR physician Dr. Gonsalez at [place] review his current MRI     2. Prostate cancer  - Marsha 4+4=8 on 1/4/24  - NM bone scan negative, PSMA scan with indeterminate lesion in proximal R femur, activity in the prostate gland and also the liver  - PSA was WNL     3. Anemia  - history of acute anemia requiring prbc transfusion  - Previous work up negative and hemoglobin has since recovered  - EGD/colonoscopy 11/2023 without varices per the patient     4. Alcoholism   - With cirrhosis    5. Femur lesion   - Biopsy was negative for malignancy     6. Hot flashes  - About 5 per day lasting a few minutes  - Not affecting QOL    Plan  Previously discussed case w/ Urology. Plan is to keep him on ADT and if he has no progression of his HCC at the 6 month follow up scan we will send him for definitive treatment to the prostate.   3 month follow up scans are scheduled through Dr. Gonsalez   Will repeat CT CAP in 4 months along with MRI (this will be the 6 month ozzie scan)  RTC in 4 months   All questions answered   Continue ADT through Dr. Mark      ______________________________________________________________________________________________    History of Present Illness  The patient is a 73-year-old male who presented to us on 5/2/2024 for the further evaluation of a new diagnosis of hepatocellular carcinoma.  The patient was in his normal state of health when he was undergoing workup for an enlarged prostate.  An MRI of the prostate revealed a highly suspicious lesion.  A prostate biopsy was performed which revealed an adenocarcinoma which was Marsha 8.  As part of the workup the patient was being evaluated for metastatic disease.  There were some equivocal lesions in the liver.  Ultimately this required a biopsy to diagnose hepatocellular carcinoma.  The patient has lost about 15 pounds over the past 2 to 3 months.  He denies any bleeding.  Around October or November 2023 he was being evaluated for anemia.  Upper and lower endoscopy were negative for sources of bleed.  His hemoglobin since recovered.  He was being seen by nurse practitioner was suspicious he may have had iron deficiency.  He drinks alcohol daily.  He is not trying to quit alcohol.      MRI prostate 12/20/2023 PI-RADS 5 with suspected extracapsular extension and possible neurovascular bundle invasion, no lymph node involvement.    1/4/2024 prostate biopsy revealed a [place] 4+4 = 8 adenocarcinoma of the prostate.  PSA was 1.1 on 10/21/2023    1/29/2024 CT scan revealed prostate mass, cirrhotic liver with 3 enhancing masses possible HCC, mild portal hypertension, small gastric varices, mild pulmonary fibrosis in lung bases    1/29/2024 nuclear medicine bone scan negative    2/21/2024 PSMA scan with prostate gland enhancement, multiple radiotracer avid hepatic mets, focal uptake of right proximal femoral diaphysis indeterminant    3/1/2024 MRI liver with 3 lesions in the liver the largest being 2.6 cm, present in both lobes of the liver.    4/16/2024 biopsy of liver lesion revealed  moderately differentiated hepatocellular carcinoma, with background cirrhosis    - s/p microwave ablation 6/6/24 with Dr. Gonsalez    Interval History  Doing well. NO weight loss. Having hot flashes but otherwise tolerating ADT  Review of Systems  Remaining 14 point comprehensive review of systems within normal limits. NCCN Distress Thermometer and Problem List were collected and documented in the patient chart.  Remarkable symptoms and concerns were discussed with the patient.  Any additional follow-up is indicated in the plan.  Past Medical and Surgical History  Prostate cancer, hepatocellular carcinoma, shingles, alcoholism      Current Medications  Medication List  Name Date  Calcium 600 + D(3) (Calcium-Cholecalciferol Oral 600 mg-10 mcg (400 unit)) 05/02/2024  Carbidopa-Levodopa Oral 25 mg-100 mg 05/02/2024  Folic Acid Oral 05/02/2024  Potassium Gluconate Oral 05/02/2024  Trazodone Oral 05/02/2024  Hydroxyzine HCl Oral 05/02/2024  Triamcinolone Topical Cream 0.5 % 05/02/2024  Omeprazole Oral Delayed Release Capsule 05/02/2024  Atenolol Oral 05/02/2024  Doxepin Oral ([place]) 05/02/2024  Vitamin B-12 (Cyanocobalamin Oral) 05/02/2024  Mirtazapine Oral 05/02/2024  Furosemide Oral 05/02/2024    Allergies  Penicillins and levothyroxine  Family History  Denies family history of malignancy      Social History  He has 1 son and 1 daughter.  His daughter presented with him today.  He is .  He previously smoked cigarettes but quit in 2006.  He drinks at least 3 glasses of scotch per night.      Vital Signs  Blood pressure: 119/81, Pulse: 74, Temperature: 97.3 F, Respirations: 16, O2 sat: 96%, Pain Scale: 0, Height: 68.4 in, Weight: 173.4 lb, BSA: 1.93, BMI: 26.06 kg/m2  Covid-19 vaccine (Moderna) (05/02/2024), Patient declined/rejected; Flu vaccine - Adult (05/02/2024), Patient declined/rejected  Performance Status ECOG or Karnofsky  ECOG: Not recorded  Karnofsky: Not recorded    Physical Exam  The patient is in  no distress, alert and oriented.         Review of Systems   The 10 point Review of Systems is negative other than noted in the HPI    Physical Exam   Temp: 99.6  F (37.6  C) Temp src: Oral BP: 115/63 Pulse: 92   Resp: 18 SpO2: 91 % O2 Device: None (Room air)    Vital Signs with Ranges  Temp:  [99.1  F (37.3  C)-99.6  F (37.6  C)] 99.6  F (37.6  C)  Pulse:  [88-92] 92  Resp:  [16-18] 18  BP: (115-138)/(63-75) 115/63  SpO2:  [91 %-92 %] 91 %  159 lbs 0 oz    Pleasant in no acute distress  HEENT normocephalic atraumatic sclera anicteric.  Neck supple no JVD.  Lungs: No respiratory distress no wheezing.  Abdomen: Soft moderate tenderness mostly on the right and mid abdomen.  Extremities no edema.  Lymphatic: Lymphadenopathy in cervical supraclavicular axillary epitrochlear or inguinal areas.  Neurological: Nonfocal.  Skin: Limited exam shows no petechia or ecchymosis.    Data   Results for orders placed or performed during the hospital encounter of 10/24/24 (from the past 24 hours)   Glucose by meter   Result Value Ref Range    GLUCOSE BY METER POCT 154 (H) 70 - 99 mg/dL   Glucose by meter   Result Value Ref Range    GLUCOSE BY METER POCT 147 (H) 70 - 99 mg/dL   Glucose by meter   Result Value Ref Range    GLUCOSE BY METER POCT 109 (H) 70 - 99 mg/dL   Glucose by meter   Result Value Ref Range    GLUCOSE BY METER POCT 116 (H) 70 - 99 mg/dL   XR Abdomen 2 Views    Narrative    ABDOMEN TWO-THREE VIEW  10/28/2024 9:42 AM     HISTORY: Sharp abdominal pain post drain.    COMPARISON: MR Liver 10/24/2024. CT abdomen and pelvis 10/24/2024.      Impression    IMPRESSION: Percutaneous drain projects over the mid upper abdomen.  Nonobstructive bowel gas pattern. Small amount of stool throughout the  colon. Limited evaluation for free air given supine technique.  Degenerative changes of the spine.     EH PERDOMO MD         SYSTEM ID:  [id number]   Basic metabolic panel   Result Value Ref Range    Sodium 128 (L) 135 - 145  mmol/L    Potassium 3.7 3.4 - 5.3 mmol/L    Chloride 92 (L) 98 - 107 mmol/L    Carbon Dioxide (CO2) 24 22 - 29 mmol/L    Anion Gap 12 7 - 15 mmol/L    Urea Nitrogen 9.7 8.0 - 23.0 mg/dL    Creatinine 0.67 0.67 - 1.17 mg/dL    GFR Estimate >90 >60 mL/min/1.73m2    Calcium 8.0 (L) 8.8 - 10.4 mg/dL    Glucose 124 (H) 70 - 99 mg/dL   CBC with platelets   Result Value Ref Range    WBC Count 12.5 (H) 4.0 - 11.0 10e3/uL    RBC Count 2.99 (L) 4.40 - 5.90 10e6/uL    Hemoglobin 9.3 (L) 13.3 - 17.7 g/dL    Hematocrit 27.7 (L) 40.0 - 53.0 %    MCV 93 78 - 100 fL    MCH 31.1 26.5 - 33.0 pg    MCHC 33.6 31.5 - 36.5 g/dL    RDW 14.6 10.0 - 15.0 %    Platelet Count 375 150 - 450 10e3/uL

## 2024-10-28 NOTE — PROGRESS NOTES
"  Interventional Radiology - Progress Note  Inpatient - [facility]  10/28/2024    S:  Patient complains of some pain at the drain site, worse w deep inhalation, movement. Patient is improving with time.    Culture 3+ Morganella morganii Abnormal    3+ Escherichia coli Abnormal    2+ Klebsiella oxytoca Abnormal         Flushing: 10mL sterile saline towards patient q8h, subtact flush from output totals      O:  /75 (BP Location: Left arm)   Pulse 88   Temp 99.1  F (37.3  C) (Oral)   Resp 16   Ht 1.753 m (5' 9\")   Wt 72.1 kg (159 lb)   SpO2 92%   BMI 23.48 kg/m    General:  Stable.  In no acute distress.    Abd: RUQ drain site dressing c/d/I, some tenderness directly over drain site, no erythema or swelling at site.     Imaging:  CT Abd/Pel w 10/24/24:  \"IMPRESSION:   1.  Cirrhotic appearance of the liver as described above. Multiple  subcapsular fluid collections are seen along the anterior portion of  the liver with moderate surrounding inflammatory changes. Findings may  reflect posttraumatic collections with hemorrhage given history of  recent trauma. Abscess collections are also in the differential  diagnosis. At least one collection along the inferior right hepatic  lobe has internal locule of gas as well as peripheral enhancement  suspicious for an abscess.  2.  Multiple liver lesions are present including an enlarging 2.2 cm  mass in the right hepatic lobe suspicious for malignancy.  Characterization of these lesions is limited due to single phase exam  however. A 2.2 cm lesion in the left hepatic lobe appears more  heterogenous and decreased in size from the prior exam. Furthermore,  another 2.7 cm hypoattenuating lesion in the right hepatic lobe  appears increased in size but more fluidlike in appearance. Both of  these lesions may reflect ablation cavities from recent microwave  ablation for known hepatocellular carcinoma. Recommend further  characterization of these findings with " "follow-up MRI liver protocol  exam.  3.  Mild gallbladder wall thickening and pericholecystic fluid.  Findings may be reactive from adjacent inflammation along the liver  described above. Cholecystitis however cannot be excluded. Suggest  correlation with laboratory values and consider follow-up abdominal  ultrasound.  4.  Segmental wall thickening is seen along the ascending and proximal  transverse colon at the hepatic flexure. Findings are suggestive of  colitis which may be reactive from adjacent inflammation along the  liver. Infectious versus infiltrate colitis cannot be excluded. An  underlying colonic mass is considered to be less likely but cannot be  excluded given the focal area of wall thickening. If further concern,  follow-up colonoscopy can be considered after acute symptoms resolve.\"    IR drain placement 10/26/24:  \"FINDINGS:  12 Fr drain placed into left perihepatic abscess. Drain injection  showed the left suzy-hepatic abscesses appeared to communicate with  each other.                                                                       IMPRESSION:    1. Successful ultrasound and fluoroscopic guided perihepatic abscess  drain placement using a 12F locking pigtail catheter.  2. 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 There  appears to be a thin fistulous tract between the abscess adjacent to  the lower left hepatic lobe and the stomach through segment 3 of the  liver, most evident on CT. The segment 3 liver lesion which was  ablated in July was directly adjacent to the stomach at that time.      PLAN:  1. Patient to be recovered on the floor.  2. Left perihepatic drain to bulb suction.  3. Flush with 10 mL NS tid.  4. Follow-up with IR once drain outputs are < 10 mL/day x 2 days with  CT w/ contrast of the abdomen/pelvis and drain sinogram. \"    Labs (Last four results)  CMP  Recent Labs   Lab 10/28/24  1148 " 10/28/24  0814 10/28/24  0212 10/27/24  2118 10/25/24  1633 10/25/24  1440 10/24/24  1421 10/24/24  1021   POTASSIUM 3.7  --   --   --   --  3.5  --  3.9   * 116* 109* 147*   < > 129*   < > 125*   BUN 9.7  --   --   --   --  11.7  --  15.3   CR 0.67  --   --   --   --  0.79  --  0.85   GFRESTIMATED >90  --   --   --   --  >90  --  >90    < > = values in this interval not displayed.     CBC  Recent Labs   Lab 10/28/24  1148 10/25/24  1440 10/24/24  1021   WBC 12.5* 14.4* 17.4*   RBC 2.99* 3.00* 3.23*   HGB 9.3* 9.3* 9.9*   HCT 27.7* 27.8* 29.7*    355 367     INR  Recent Labs   Lab 10/25/24  1440 10/24/24  1401   INR 1.20* 1.25*       Drain Outputs (in mL):  10/26 85   10/27 45   10/28 5*           *as of 1525    A:  73 year old male with perihepatic abscess, s/p RUQ drain placement. The drain appears to be intact with purulent discharge to ASHLIE bulb. IV abx are on board. Surgery and Medicine are following.     P:    - Polymicrobial abscess, see cultures above  - Continue present drain cares. Continue drain to ASHLIE drainage. Flush drain as ordered.  - Pain possibly from irritation of liver as abscess size decreases, diaphragmatic irritation, less likely rib irritation given location. Recommend oral analgesics PRN. No red flags on Abd XR today.  - Antibiotics per treatment team.   - Anticipate that patient will likely discharge with drain in place.  - Drain care education provided to patient. All questions answered.   - Drain discharge instructions entered into D/C navigator.  - Patient to flush drain with 10 mL NS daily upon discharge. NS flushes ordered in D/C navigator.  - Will continue to follow patient's clinical status, imaging, drain(s) function and drain(s) OPs to determine drain follow up plan. Anticipating follow-up CT/abscessogram approximately 7-14 days from drain placement date, once drain outputs are <20mL/day for a consecutive 2 days, and/or pending patient's clinical status and drain  function.   - Drain follow up can occur as inpatient or outpatient, most often the first drain check will occur as outpatient. Outpatient drain follow up orders have been entered.   - IR will continue to follow loosely. Please contact IR with drain related questions or concerns.    Gregg Winkler PA-C  Interventional Radiology  *[phone]      Total Time: 25 minutes

## 2024-10-29 ENCOUNTER — APPOINTMENT (OUTPATIENT)
Dept: OCCUPATIONAL THERAPY | Facility: CLINIC | Age: 74
DRG: 391 | End: 2024-10-29
Payer: COMMERCIAL

## 2024-10-29 ENCOUNTER — APPOINTMENT (OUTPATIENT)
Dept: PHYSICAL THERAPY | Facility: CLINIC | Age: 74
DRG: 391 | End: 2024-10-29
Attending: INTERNAL MEDICINE
Payer: COMMERCIAL

## 2024-10-29 LAB
ANION GAP SERPL CALCULATED.3IONS-SCNC: 11 MMOL/L (ref 7–15)
BACTERIA BLD CULT: NO GROWTH
BUN SERPL-MCNC: 10 MG/DL (ref 8–23)
CALCIUM SERPL-MCNC: 8 MG/DL (ref 8.8–10.4)
CHLORIDE SERPL-SCNC: 93 MMOL/L (ref 98–107)
CREAT SERPL-MCNC: 0.66 MG/DL (ref 0.67–1.17)
EGFRCR SERPLBLD CKD-EPI 2021: >90 ML/MIN/1.73M2
ERYTHROCYTE [DISTWIDTH] IN BLOOD BY AUTOMATED COUNT: 14.8 % (ref 10–15)
GLUCOSE SERPL-MCNC: 141 MG/DL (ref 70–99)
HCO3 SERPL-SCNC: 23 MMOL/L (ref 22–29)
HCT VFR BLD AUTO: 25.8 % (ref 40–53)
HGB BLD-MCNC: 9.1 G/DL (ref 13.3–17.7)
MCH RBC QN AUTO: 32 PG (ref 26.5–33)
MCHC RBC AUTO-ENTMCNC: 35.3 G/DL (ref 31.5–36.5)
MCV RBC AUTO: 91 FL (ref 78–100)
PLATELET # BLD AUTO: 396 10E3/UL (ref 150–450)
POTASSIUM SERPL-SCNC: 3.4 MMOL/L (ref 3.4–5.3)
RBC # BLD AUTO: 2.84 10E6/UL (ref 4.4–5.9)
SODIUM SERPL-SCNC: 127 MMOL/L (ref 135–145)
WBC # BLD AUTO: 9.9 10E3/UL (ref 4–11)

## 2024-10-29 PROCEDURE — 36415 COLL VENOUS BLD VENIPUNCTURE: CPT | Performed by: INTERNAL MEDICINE

## 2024-10-29 PROCEDURE — 250N000013 HC RX MED GY IP 250 OP 250 PS 637: Performed by: INTERNAL MEDICINE

## 2024-10-29 PROCEDURE — 97162 PT EVAL MOD COMPLEX 30 MIN: CPT | Mod: GP

## 2024-10-29 PROCEDURE — 250N000011 HC RX IP 250 OP 636: Performed by: INTERNAL MEDICINE

## 2024-10-29 PROCEDURE — 85014 HEMATOCRIT: CPT | Performed by: INTERNAL MEDICINE

## 2024-10-29 PROCEDURE — 97530 THERAPEUTIC ACTIVITIES: CPT | Mod: GP

## 2024-10-29 PROCEDURE — 120N000001 HC R&B MED SURG/OB

## 2024-10-29 PROCEDURE — 99232 SBSQ HOSP IP/OBS MODERATE 35: CPT | Performed by: PHYSICIAN ASSISTANT

## 2024-10-29 PROCEDURE — 250N000011 HC RX IP 250 OP 636: Performed by: PHYSICIAN ASSISTANT

## 2024-10-29 PROCEDURE — 97535 SELF CARE MNGMENT TRAINING: CPT | Mod: GO | Performed by: OCCUPATIONAL THERAPIST

## 2024-10-29 PROCEDURE — 99232 SBSQ HOSP IP/OBS MODERATE 35: CPT | Performed by: INTERNAL MEDICINE

## 2024-10-29 PROCEDURE — 80048 BASIC METABOLIC PNL TOTAL CA: CPT | Performed by: INTERNAL MEDICINE

## 2024-10-29 PROCEDURE — 97116 GAIT TRAINING THERAPY: CPT | Mod: GP

## 2024-10-29 RX ORDER — ERTAPENEM 1 G/1
1 INJECTION, POWDER, LYOPHILIZED, FOR SOLUTION INTRAMUSCULAR; INTRAVENOUS EVERY 24 HOURS
Status: DISCONTINUED | OUTPATIENT
Start: 2024-10-29 | End: 2024-11-01 | Stop reason: HOSPADM

## 2024-10-29 RX ADMIN — CARBIDOPA AND LEVODOPA 1 TABLET: 25; 250 TABLET ORAL at 10:32

## 2024-10-29 RX ADMIN — DOCUSATE SODIUM 100 MG: 100 CAPSULE, LIQUID FILLED ORAL at 10:32

## 2024-10-29 RX ADMIN — ERTAPENEM SODIUM 1 G: 1 INJECTION, POWDER, LYOPHILIZED, FOR SOLUTION INTRAMUSCULAR; INTRAVENOUS at 14:33

## 2024-10-29 RX ADMIN — METRONIDAZOLE 500 MG: 500 INJECTION, SOLUTION INTRAVENOUS at 01:52

## 2024-10-29 RX ADMIN — METHOCARBAMOL 750 MG: 750 TABLET ORAL at 21:00

## 2024-10-29 RX ADMIN — METHOCARBAMOL 750 MG: 750 TABLET ORAL at 10:32

## 2024-10-29 RX ADMIN — HYDROMORPHONE HYDROCHLORIDE 1 MG: 2 TABLET ORAL at 01:23

## 2024-10-29 RX ADMIN — METHOCARBAMOL 750 MG: 750 TABLET ORAL at 14:39

## 2024-10-29 RX ADMIN — CEFEPIME 2 G: 2 INJECTION, POWDER, FOR SOLUTION INTRAVENOUS at 01:14

## 2024-10-29 RX ADMIN — DOCUSATE SODIUM 100 MG: 100 CAPSULE, LIQUID FILLED ORAL at 20:59

## 2024-10-29 RX ADMIN — Medication 1 TABLET: at 10:32

## 2024-10-29 RX ADMIN — HYDROMORPHONE HYDROCHLORIDE 1 MG: 2 TABLET ORAL at 10:43

## 2024-10-29 RX ADMIN — METHOCARBAMOL 750 MG: 750 TABLET ORAL at 18:32

## 2024-10-29 NOTE — PROGRESS NOTES
"[facility]    Hospitalist Progress Note    Interval History   - Abdominal pain improved  - Drain continues, management per IR and ID appreciated    Assessment & Plan   Summary: Lamine Samano is a 73 year old male with PMH hepatocellular carcinoma on microwave ablation, Parkinson's disease, alcohol use disorder, history of prostate cancer on androgen deprivation therapy, hypertension, type 2 diabetes mellitus, cirrhosis of liver, who was admitted on 10/24/2024 with abdominal pain and liver lesions of unclear etiology.    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 No fever, nausea vomiting or diarrhea, note leukocytosis to 17k on admission. CT abdomen pelvis-multiple subcapsular fluid collection with surrounding inflammatory changes, posttraumatic collection of hemorrhage versus abscess.  Multiple liver lesions, possible HCC.  Mild gallbladder wall thickening and pericholecystic fluid, could be reactive from adjacent inflammation along the liver, segmental wall thickening along the ascending and proximal transverse colon at the hepatic flexure, possible colitis which may be reactive from adjacent inflammation along the liver. Follow up liver MRI shows hepatic metastases as well as a \"Subcapsular/subdiaphragmatic complex fluid collection along the anterior aspect left hepatic lobe extending to the third metastasis and left hepatic lobe inferiorly... This likely represents a subdiaphragmatic abscess.\" HIDA scan suspicious for chronic cholecystitis. On exam he has liver enlargement and epigastric tenderness over area of liver lesions.   Seen by IR on 10/26, chronic cholecystitis felt to be unlikely from HIDA scan. Patient underwent drainage of liver lesion with pus return and confirmed to be liver abscess. 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   Patient with sharp abdominal pain 10/28 related to movement, position. Abd XR shows no acute pathology.  - Appreciate ID consult   - Ertapenem   - Pending discharge ID recs--repeat imaging first  - Appreciate Surgery consult  - Appreciate IR consult   - S/p liver drain 10/26 in place  - Await abscess cultures and sensitivities, currently appears polymicrobial  - Pain control with p.o. oxycodone/IV Dilaudid    Hepatocellular carcinoma  History of prostate cancer  Known history of HCC status post microwave ablation, supposed to get neck cycle of treatment November, and prostate cancer on androgen deprivation therapy, follows with Dr. Dreyer from [facility]  - Oncology consult appreciated    Liver cirrhosis  History of alcohol use disorder  Hyperbilirubinemia, mild  Elevated transaminases, mild  Reports still drinking about 2 drinks a day, has cut down from previously.  Never had alcohol withdrawal before.   - No  CIWA, no significant withdrawal noted this admission  - Patient declined thiamine and folate but agreed to take multivitamin  - [facility] consulted, appreciate their recs    Parkinson's disease  - PTA Sinemet    Hyponatremia, mild  Na 128. Patient appears euvolemic.  -Further workup if remains low or decreases further    Chronic anemia  Appears like over the last year hemoglobin has been around 10-12. Hemoglobin at presentation 9.9. Patient denies any active bleed.  But does have evidence of possible hematoma as above  - Monitor for further drop and if remains stable would recommend outpatient follow-up    Type 2 diabetes mellitus  - Hold PTA metformin  - Hemoglobin A1c on 9/18/2024 was 6.3  - Stop sliding scale insulin BG have been < 150    Hypertension  - Holding PTA atenolol SBP < 140s so far    Clinically Significant Risk Factors         # Hyponatremia: Lowest Na = 128 mmol/L in last 2 days, will monitor as appropriate  # Hypochloremia: Lowest Cl = 92 mmol/L in  last 2 days, will monitor as appropriate      # Hypoalbuminemia: Lowest albumin = 2.7 g/dL at 10/25/2024  2:40 PM, will monitor as appropriate       # Hypertension: Noted on problem list                       PT/OT: ordered  Diet: Advance Diet as Tolerated: Regular Diet Adult; Moderate Consistent Carb (60 g CHO per Meal) Diet    DVT Prophylaxis: Pneumatic Compression Devices  Diaz Catheter: Not present  Lines: None     Cardiac Monitoring: None  Code Status: Full Code    Medically Ready for Discharge: Anticipated Tomorrow to home    - Total time spent on encounter is 35 minutes, which includes counseling patient and family, coordination of care, managing orders, interpreting tests, and/or time spent discussing with consultants.      Parveen Sanders MD  Hospitalist Service  [facility]  Securely message with Vocera (more info)  Text page via [employer] Paging/Directory       Data reviewed today: I reviewed all new labs and imaging results over the last 24 hours.    Physical Exam   Temp: 98.8  F (37.1  C) Temp src: Oral BP: 121/79 Pulse: 71   Resp: 16 SpO2: 90 % O2 Device: None (Room air) Oxygen Delivery: 1 LPM  Vitals:    10/24/24 0912   Weight: 72.1 kg (159 lb)     Vital Signs with Ranges  Temp:  [98.2  F (36.8  C)-99.6  F (37.6  C)] 98.8  F (37.1  C)  Pulse:  [71-92] 71  Resp:  [16-20] 16  BP: (115-124)/(63-79) 121/79  SpO2:  [88 %-91 %] 90 %  I/O last 3 completed shifts:  In: -   Out: 1015 [Urine:950; Drains:65]  O2 requirements: none    Constitutional: Male in NAD, resting tremor  HEENT: Eyes nonicteric, oral mucosa moist  Cardiovascular: RRR, normal S1/2, no m/r/g  Respiratory: CTAB, no wheezing or crackles  Vascular: No LE pitting edema  GI: Normoactive bowel sounds, nontender, nondistended, epigastric ASHLIE drain with some pus inside  Skin/Integumen: No rashes  Neuro/Psych: Appropriate affect and mood. A&Ox3, moves all extremities    Medications   Current Facility-Administered Medications    Medication Dose Route Frequency Provider Last Rate Last Admin     Current Facility-Administered Medications   Medication Dose Route Frequency Provider Last Rate Last Admin    carbidopa-levodopa (SINEMET)  MG per tablet 1 tablet  1 tablet Oral BID Rhonda Farmer MD   1 tablet at 10/29/24 1032    docusate sodium (COLACE) capsule 100 mg  100 mg Oral BID Rhonda Farmer MD   100 mg at 10/29/24 1032    ertapenem (INVanz) 1 g vial to attach to  mL bag  1 g Intravenous Q24H Evy Sandoval PA-C        methocarbamol (ROBAXIN) tablet 750 mg  750 mg Oral 4x Daily Parveen Sanders MD   750 mg at 10/29/24 1032    multivitamin w/minerals (THERA-VIT-M) tablet 1 tablet  1 tablet Oral Daily Rhonad Farmer MD   1 tablet at 10/29/24 1032    sodium chloride (PF) 0.9% PF flush 10 mL  10 mL Irrigation Q8H Sumit Zapata MD   10 mL at 10/29/24 1045    sodium chloride (PF) 0.9% PF flush 3 mL  3 mL Intracatheter Q8H Rhonda Farmer MD   3 mL at 10/29/24 0650       Data   Recent Labs   Lab 10/29/24  1310 10/28/24  1148 10/28/24  0814 10/28/24  0212 10/25/24  1633 10/25/24  1440 10/24/24  1421 10/24/24  1401 10/24/24  1021   WBC 9.9 12.5*  --   --   --  14.4*  --   --  17.4*   HGB 9.1* 9.3*  --   --   --  9.3*  --   --  9.9*   MCV 91 93  --   --   --  93  --   --  92    375  --   --   --  355  --   --  367   INR  --   --   --   --   --  1.20*  --  1.25*  --    NA  --  128*  --   --   --  128*  --   --  131*   POTASSIUM  --  3.7  --   --   --  3.5  --   --  3.9   CHLORIDE  --  92*  --   --   --  93*  --   --  92*   CO2  --  24  --   --   --  25  --   --  23   BUN  --  9.7  --   --   --  11.7  --   --  15.3   CR  --  0.67  --   --   --  0.79  --   --  0.85   ANIONGAP  --  12  --   --   --  10  --   --  16*   JORGE  --  8.0*  --   --   --  8.1*  --   --  8.4*   GLC  --  124* 116* 109*   < > 129*   < >  --  125*   ALBUMIN  --   --   --   --   --  2.7*  --   --  3.0*   PROTTOTAL  --   --   --   --   --  6.3*  --   --   7.2   BILITOTAL  --   --   --   --   --  1.2  --   --  1.3*   ALKPHOS  --   --   --   --   --  159*  --   --  168*   ALT  --   --   --   --   --  5  --   --  16   AST  --   --   --   --   --  66*  --   --  51*   LIPASE  --   --   --   --   --   --   --   --  23    < > = values in this interval not displayed.       Imaging:   No results found for this or any previous visit (from the past 24 hours).

## 2024-10-29 NOTE — PROGRESS NOTES
[facility]    Infectious Disease Progress Note    Date of Service : 10/29/2024     Assessment:  73YM with hx of hepatic cirrhosis complicated by hepatocellular carcinoma- receiving microwave therapy-last session 3 months ago, who has been hospitalized with abdominal pain following a fall on 10/16, and  Ct findings of multiple  subcapsular fluid collections which seem to be consistent with hematomas given drop in Hb from 11.8 (on 10/16) to 9.3g.dl on admission. MRI however shows Subcapsular/subdiaphragmatic complex fluid collection consistent with abscess     -S/p fall on 10/16 with abdominal pain related to multiple subcapsular hematomas / liver abscess s/p drain placement 10/26 - cultures thus far with E.coli, Klebsiella, and morganella. -Acute blood loss anemia related to above  -Leukocytosis is likely reactive   -Possible colitis on imaging, likely reactive. No clinical symptoms of infectious colitis  -Hepatocellular carcinoma, receiving microwave therapy  -Liver cirrhosis with mildly elevated liver enzymes  -Hyponatremia  -Parkinson's disease  -diabetes  -Chronic medical conditions - Htn, chronic anemia, hx of prostate cancer     Recommendations:  Change Ceftriaxone and Flagyl to Ertapenem.    Discussed with IR. It appeared the perihepatic abscesses communicated with each other on sinogram. There was also concern for fistula between abscess adjacent to left hepatic lobe and stomach. We would like repeat CT and sinogram prior to patient being discharged to help determine antibiotic therapy at discharge. They have asked for us to order the CT with contrast and they will order CT sinogram for tomorrow.    Patient and plan discussed with Dr. Casas.     Evy Sandoval PA-C    Interval History   Has remained afebrile, no new complaints. Sore in mid abdomen.      Physical Exam   Temp: 98.8  F (37.1  C) Temp src: Oral BP: 121/79 Pulse: 71   Resp: 16 SpO2: 90 % O2 Device: None (Room air) Oxygen  Delivery: 1 LPM  Vitals:    10/24/24 0912   Weight: 72.1 kg (159 lb)     Vital Signs with Ranges  Temp:  [98.2  F (36.8  C)-99.6  F (37.6  C)] 98.8  F (37.1  C)  Pulse:  [71-92] 71  Resp:  [16-20] 16  BP: (115-124)/(63-79) 121/79  SpO2:  [88 %-91 %] 90 %    GENERAL APPEARANCE:  awake, chronically ill appearing male  EYES: Eyes grossly normal to inspection  RESP: lungs clear   CV: S1S2, irregular  ABDOMEN: epigastric and RUQ tenderness. ASHLIE drain in place with minimal output.   SKIN: no suspicious lesions or rashes    Other:    Medications   Current Facility-Administered Medications   Medication Dose Route Frequency Provider Last Rate Last Admin     Current Facility-Administered Medications   Medication Dose Route Frequency Provider Last Rate Last Admin    carbidopa-levodopa (SINEMET)  MG per tablet 1 tablet  1 tablet Oral BID Rhonda Farmer MD   1 tablet at 10/29/24 1032    ceFEPIme (MAXIPIME) 2 g vial to attach to  mL bag for ADULTS or NS 50 mL bag for PEDS  2 g Intravenous Q12H Parveen Sanders MD   2 g at 10/29/24 0114    docusate sodium (COLACE) capsule 100 mg  100 mg Oral BID Rhonda Farmer MD   100 mg at 10/29/24 1032    methocarbamol (ROBAXIN) tablet 750 mg  750 mg Oral 4x Daily Parveen Sanders MD   750 mg at 10/29/24 1032    metroNIDAZOLE (FLAGYL) infusion 500 mg  500 mg Intravenous Q12H Parveen Sanders MD   500 mg at 10/29/24 0152    multivitamin w/minerals (THERA-VIT-M) tablet 1 tablet  1 tablet Oral Daily Rhonda Farmer MD   1 tablet at 10/29/24 1032    sodium chloride (PF) 0.9% PF flush 10 mL  10 mL Irrigation Q8H Sumit Zapata MD   10 mL at 10/29/24 1045    sodium chloride (PF) 0.9% PF flush 3 mL  3 mL Intracatheter Q8H Rhonda Farmer MD   3 mL at 10/29/24 0650       Data   All microbiology laboratory data reviewed.  Recent Labs   Lab Test 10/29/24  1310 10/28/24  1148 10/25/24  1440   WBC 9.9 12.5* 14.4*   HGB 9.1* 9.3* 9.3*   HCT 25.8* 27.7* 27.8*   MCV 91 93 93   PLT  396 375 355     Recent Labs   Lab Test 10/28/24  1148 10/25/24  1440 10/24/24  1021   CR 0.67 0.79 0.85     Microbiology  10/26/2024 1211 10/28/2024 1309 Abscess Aerobic Bacterial Culture Routine With Gram Stain [[id number]]   (Abnormal)   Abscess from Abdomen    Preliminary result Component Value   Culture 3+ Morganella morganii Abnormal  P    3+ Escherichia coli Abnormal  P    2+ Klebsiella oxytoca Abnormal  P   Gram Stain Result 2+ Gram negative bacilli Abnormal  P    4+ WBC seen Abnormal  P    Predominantly PMNs             10/26/2024 1210 10/27/2024 1646 Anaerobic bacterial culture [[id number]]   Abscess from Abdomen    Preliminary result Component Value   Culture No anaerobic organisms isolated after 1 day P             10/24/2024 1303 10/28/2024 1531 Blood Culture Peripheral Blood [[id number]]   Peripheral Blood    Preliminary result Component Value   Culture No growth after 4 days P                 Imaging  EXAM: MR LIVER W/O and W CONTRAST  LOCATION: [facility]  DATE: 10/24/2024     INDICATION: History of liver cancer, s p ablation, liver lesions, also recent trauma hematoma vs abscess  COMPARISON: CT 10/24/2024  TECHNIQUE: Routine MRI liver protocol including T1 in/out phase, diffusion, multiplane T2, and dynamic T1 with IV contrast.    CONTRAST: 7mL Gadavist     FINDINGS:      LIVER: Post ablative changes involving the 3 hepatic metastasis. This results in absence of T2 signal abnormality at the prior metastatic sites with precontrast imaging demonstrating varying degrees of T1 signal indicating internal residual blood   products from the procedure. No evidence for significant enhancement involving the hepatic metastasis. 0.9 cm focus of enhancement involving the far superior aspect of the right hepatic lobe anteriorly (series 12, image 89). Subdiaphragmatic curvilinear   fluid collection over the anterior aspect of the left hepatic lobe extending down to the level of the  postoperative change and measuring at maximal diameters of 5.4 x 2.2 x 4.8 cm (4/19 and 3/8). Adjacent inflammatory change in the subcapsular soft   tissues. No calcified gallstones or biliary dilatation. Mild diffuse fatty infiltration.     ADDITIONAL FINDINGS: Pancreas, spleen and both adrenal glands unremarkable. Symmetric renal enhancement. Bilateral renal cysts. Normal caliber aorta. No lymphadenopathy.                                                                      IMPRESSION:  1.  Post ablative changes of underlying 3 hepatic metastasis which demonstrate no significant T2 signal abnormality which is resolved with new T1 signal internally indicating hemorrhagic components and no significant enhancement.     2.  Subcapsular/subdiaphragmatic complex fluid collection along the anterior aspect left hepatic lobe extending to the third metastasis and left hepatic lobe inferiorly. Dimensions as detailed above. 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   Continued attention to this on follow-up is recommended.     3.  A 0.9 cm focus of enhancement involving the anterosuperior aspect of the right hepatic lobe in the extreme subdiaphragmatic space of the right hepatic lobe. Attention to this on follow-up is recommended to assess for possible hepatic metastasis at   this location.

## 2024-10-29 NOTE — PLAN OF CARE
Goal Outcome Evaluation:    Patient discharged at 7:08 PM to Discharged to home  IV was discontinued. Pain at time of discharge was 0/10. Belongings returned to patient.  Discharge instructions and medications reviewed with patient.  Patient verbalized understanding and all questions were answered.  Prescriptions given to patient.  At time of discharge, patient condition was stable and left the unit escorted by Family, refused transport.

## 2024-10-29 NOTE — PLAN OF CARE
Goal Outcome Evaluation:    Orientation: A&Ox4  Aggression Stop Light: green  Activity: A1 GBW  Diet/BS Checks: moderated carb diet  Tele: NA  IV Access/Drains: R PIV SL, ASHLIE drain to RUQ flushed w/ 10mL NS  Pain Management: c/o pain to R heel, blanchable redness, peeling, skin not broken, repositioned, PRN Dilaudid PO 1mg x1  Abnormal VS/Results: VSS on RA  Bowel/Bladder: continent b/b, no BM this shift  Skin/Wounds: Scattered bruising and scabbing, rash to chest and back, blanchable redness to coccyx  Consults: GI, ID, Hem/Onc, IR  D/C Disposition: Discharge when ASHLIE drain output < 20 mL/day for two (2) days.

## 2024-10-29 NOTE — PLAN OF CARE
10/29/24 4364   Appointment Info   Signing Clinician's Name / Credentials (PT) Mimi Josue DPT   Living Environment   People in Home spouse   Current Living Arrangements other (see comments);apartment   Home Accessibility no concerns   Transportation Anticipated public transportation   Living Environment Comments Elevator   Self-Care   Usual Activity Tolerance moderate   Current Activity Tolerance fair   Regular Exercise No   Fall history within last six months yes   Number of times patient has fallen within last six months 3   Activity/Exercise/Self-Care Comment pt has FWW and WC at home, reports when he fell most recently he was not using his walker   General Information   Onset of Illness/Injury or Date of Surgery 10/24/24   Referring Physician Parveen Sanders MD   Pertinent History of Current Problem (include personal factors and/or comorbidities that impact the POC) Lamine Samano is a 73 year old male with PMH hepatocellular carcinoma on microwave ablation, Parkinson's disease, alcohol use disorder, history of prostate cancer on androgen deprivation therapy, hypertension, type 2 diabetes mellitus, cirrhosis of liver, who was admitted on 10/24/2024 with abdominal pain and liver lesions of unclear etiology.   Existing Precautions/Restrictions fall   General Observations Pt has Parkinsons   Cognition   Affect/Mental Status (Cognition) WFL   Pain Assessment   Patient Currently in Pain No   Integumentary/Edema   Integumentary/Edema Comments minor BLE swelling   Posture    Posture Forward head position   Range of Motion (ROM)   ROM Comment BLE WFL   Strength (Manual Muscle Testing)   Strength Comments Gross functional weakness w/ OOB mobility   Bed Mobility   Comment, (Bed Mobility) Supine > sitting EOB HOB elevated, min A to complete. Pt's HOB elevates at home   Transfers   Comment, (Transfers) STS FWW close CGA, heavy use of UE push off to complete transfer   Gait/Stairs (Locomotion)   Comment,  (Gait/Stairs) 3' eval FWW, downward gaze, shuffling gait   Balance   Balance Comments Fair dynamic balance w/ use of FWW   Sensory Examination   Sensory Perception Comments intact to light touch   Clinical Impression   Criteria for Skilled Therapeutic Intervention Yes, treatment indicated   PT Diagnosis (PT) impaired IND w/ mobility from baseline   Influenced by the following impairments functional weakness, impaired balance, impaired activity tolerance   Functional limitations due to impairments impaired IND with bed mobility, transfers, gait from baseline   Clinical Presentation (PT Evaluation Complexity) evolving   Clinical Presentation Rationale co morbidities, clinical judgement   Clinical Decision Making (Complexity) moderate complexity   Planned Therapy Interventions (PT) balance training;bed mobility training;gait training;home exercise program;patient/family education;strengthening;transfer training;neuromuscular re-education   Risk & Benefits of therapy have been explained evaluation/treatment results reviewed;care plan/treatment goals reviewed;risks/benefits reviewed;patient   PT Total Evaluation Time   PT Eval, Moderate Complexity Minutes (18397) 12   Physical Therapy Goals   PT Frequency Daily   PT Predicted Duration/Target Date for Goal Attainment 11/05/24   PT Goals Bed Mobility;Gait;Transfers   PT: Bed Mobility Modified independent;Supine to/from sit  (pts bed elevates at home)   PT: Transfers Sit to/from stand;Bed to/from chair;Assistive device;Modified independent   PT: Gait Modified independent;Rolling walker;50 feet   Interventions   Interventions Quick Adds Gait Training;Therapeutic Activity   Therapeutic Activity   Therapeutic Activities: dynamic activities to improve functional performance Minutes (26228) 9   Symptoms Noted During/After Treatment Fatigue;Dizziness   Treatment Detail/Skilled Intervention Eval complete, tx indicated. Pt pleasant. Supine > sitting EOB, pt required min A from PT  to complete. Pt sat EOB several minutes, has c/o light headedness w/ OOB mobility. BP stable w/ OOB mobility, Light headedness improved w/ mobility. Educated on performing setaed AP and LAQ for improved bld flow. Pt assisted into BR w/ FWW, cues for proximity to walker, close CGA navigating small space in BR. Pt able to doff brief w/ inc time, cues for use of grab bar. Pt able to perform cares while seated on toilet no LOB. STS from toilet w/ CGA. Slightly unsteady when donning brief. ENd of session sit > supine   Gait Training   Gait Training Minutes (01642) 8   Symptoms Noted During/After Treatment (Gait Training) fatigue   Treatment Detail/Skilled Intervention Gait training in rm pt declined hallway ambulation. CGA w/ FWW. Cues for proximity to walker w/ turns, increase step length. No overt LOB however gross instability noted. Fatigues quickly   Distance in Feet 50   [place] Level (Gait Training) contact guard   PT Discharge Planning   PT Plan Monitor BP, progress LE strength, transfers, gait in hallway   PT Discharge Recommendation (DC Rec) home with home care physical therapy   PT Rationale for DC Rec Pt currently A x 1 with FWW for functional mobility. Recommend DC to home w/ assist from family for household tasks. Use of FWW for all mobility. Recommend HHPT to progress strength, balance, activity tolerance as pt below baseline at this time. Pt in agreement w/ HHPT   PT Brief overview of current status CGA FWW   Physical Therapy Time and Intention   Timed Code Treatment Minutes 17   Total Session Time (sum of timed and untimed services) 29

## 2024-10-29 NOTE — PLAN OF CARE
Goal Outcome Evaluation:  Orientation: A&Ox4; calm and cooperative with cares.   Aggression Stop Light: green  Activity: Ax1 with GB and walker. Gait is steady.  Diet/BS Checks: mod carb diet, adequate intake this shift.   Tele:  N/A  IV Access/Drains: PIV x1 CDI SL. INT abx    Pain Management: abd pain, heel pain. Refused interventions.   Abnormal VS/Results: VSS; RA  Bowel/Bladder: Continent of bowel/bladder; Constipated scheduled colace given.   Skin/Wounds: Scattered bruising and redness/rash to chest.   Consults: GI, ID, oncology, surgery  D/C Disposition: pending progress/plan    Other Info:   -Flush ASHLIE dressing CDI

## 2024-10-29 NOTE — PLAN OF CARE
Nursing shift [id number] update  Medicated with Dilaudid x 1 for moderate amt right side abdominal pain.  Has mild tolerable chronic right heel pain of which writer suggested pt to wear shoes (ideally with heel padded insoles for added comfort).   Gets up with assist of 1/gb/walker. Uses urinal but also was inc of urine - writer changed brief x1. Fair appetite. Antibiotics changed today. Cefepime and Flagyl dc'd and was started on Invanz. General weakness PT and OT following  Continue to monitor

## 2024-10-30 ENCOUNTER — APPOINTMENT (OUTPATIENT)
Dept: CT IMAGING | Facility: CLINIC | Age: 74
DRG: 391 | End: 2024-10-30
Attending: PHYSICIAN ASSISTANT
Payer: COMMERCIAL

## 2024-10-30 ENCOUNTER — APPOINTMENT (OUTPATIENT)
Dept: OCCUPATIONAL THERAPY | Facility: CLINIC | Age: 74
DRG: 391 | End: 2024-10-30
Payer: COMMERCIAL

## 2024-10-30 ENCOUNTER — APPOINTMENT (OUTPATIENT)
Dept: CT IMAGING | Facility: CLINIC | Age: 74
DRG: 391 | End: 2024-10-30
Attending: NURSE PRACTITIONER
Payer: COMMERCIAL

## 2024-10-30 LAB
ANION GAP SERPL CALCULATED.3IONS-SCNC: 14 MMOL/L (ref 7–15)
BUN SERPL-MCNC: 8.4 MG/DL (ref 8–23)
CALCIUM SERPL-MCNC: 8.6 MG/DL (ref 8.8–10.4)
CHLORIDE SERPL-SCNC: 94 MMOL/L (ref 98–107)
CREAT SERPL-MCNC: 0.63 MG/DL (ref 0.67–1.17)
CREAT UR-MCNC: 62.3 MG/DL
EGFRCR SERPLBLD CKD-EPI 2021: >90 ML/MIN/1.73M2
FRACT EXCRET NA UR+SERPL-RTO: 0.4 %
GLUCOSE SERPL-MCNC: 130 MG/DL (ref 70–99)
HCO3 SERPL-SCNC: 19 MMOL/L (ref 22–29)
OSMOLALITY SERPL: 273 MMOL/KG (ref 280–301)
OSMOLALITY UR: 307 MMOL/KG (ref 100–1200)
POTASSIUM SERPL-SCNC: 3.8 MMOL/L (ref 3.4–5.3)
SODIUM SERPL-SCNC: 127 MMOL/L (ref 135–145)
SODIUM UR-SCNC: 55 MMOL/L

## 2024-10-30 PROCEDURE — 120N000001 HC R&B MED SURG/OB

## 2024-10-30 PROCEDURE — 250N000013 HC RX MED GY IP 250 OP 250 PS 637: Performed by: INTERNAL MEDICINE

## 2024-10-30 PROCEDURE — 99232 SBSQ HOSP IP/OBS MODERATE 35: CPT | Performed by: PHYSICIAN ASSISTANT

## 2024-10-30 PROCEDURE — 83935 ASSAY OF URINE OSMOLALITY: CPT | Performed by: INTERNAL MEDICINE

## 2024-10-30 PROCEDURE — 97530 THERAPEUTIC ACTIVITIES: CPT | Mod: GO

## 2024-10-30 PROCEDURE — 99232 SBSQ HOSP IP/OBS MODERATE 35: CPT | Performed by: INTERNAL MEDICINE

## 2024-10-30 PROCEDURE — 250N000011 HC RX IP 250 OP 636: Performed by: PHYSICIAN ASSISTANT

## 2024-10-30 PROCEDURE — 250N000009 HC RX 250: Performed by: PHYSICIAN ASSISTANT

## 2024-10-30 PROCEDURE — 36415 COLL VENOUS BLD VENIPUNCTURE: CPT | Performed by: INTERNAL MEDICINE

## 2024-10-30 PROCEDURE — 80048 BASIC METABOLIC PNL TOTAL CA: CPT | Performed by: INTERNAL MEDICINE

## 2024-10-30 PROCEDURE — 74177 CT ABD & PELVIS W/CONTRAST: CPT

## 2024-10-30 PROCEDURE — 83930 ASSAY OF BLOOD OSMOLALITY: CPT | Performed by: INTERNAL MEDICINE

## 2024-10-30 PROCEDURE — 76080 X-RAY EXAM OF FISTULA: CPT

## 2024-10-30 PROCEDURE — 84300 ASSAY OF URINE SODIUM: CPT | Performed by: INTERNAL MEDICINE

## 2024-10-30 RX ORDER — ERTAPENEM 1 G/1
1 INJECTION, POWDER, LYOPHILIZED, FOR SOLUTION INTRAMUSCULAR; INTRAVENOUS EVERY 24 HOURS
DISCHARGE
Start: 2024-10-31 | End: 2024-11-27

## 2024-10-30 RX ORDER — IOPAMIDOL 755 MG/ML
78 INJECTION, SOLUTION INTRAVASCULAR ONCE
Status: COMPLETED | OUTPATIENT
Start: 2024-10-30 | End: 2024-10-30

## 2024-10-30 RX ADMIN — CARBIDOPA AND LEVODOPA 1 TABLET: 25; 250 TABLET ORAL at 21:40

## 2024-10-30 RX ADMIN — ERTAPENEM SODIUM 1 G: 1 INJECTION, POWDER, LYOPHILIZED, FOR SOLUTION INTRAMUSCULAR; INTRAVENOUS at 13:03

## 2024-10-30 RX ADMIN — METHOCARBAMOL 750 MG: 750 TABLET ORAL at 17:09

## 2024-10-30 RX ADMIN — DOCUSATE SODIUM 100 MG: 100 CAPSULE, LIQUID FILLED ORAL at 21:40

## 2024-10-30 RX ADMIN — CARBIDOPA AND LEVODOPA 1 TABLET: 25; 250 TABLET ORAL at 08:58

## 2024-10-30 RX ADMIN — METHOCARBAMOL 750 MG: 750 TABLET ORAL at 08:57

## 2024-10-30 RX ADMIN — METHOCARBAMOL 750 MG: 750 TABLET ORAL at 21:41

## 2024-10-30 RX ADMIN — METHOCARBAMOL 750 MG: 750 TABLET ORAL at 12:57

## 2024-10-30 RX ADMIN — DOCUSATE SODIUM 100 MG: 100 CAPSULE, LIQUID FILLED ORAL at 08:57

## 2024-10-30 RX ADMIN — IOPAMIDOL 78 ML: 755 INJECTION, SOLUTION INTRAVENOUS at 14:12

## 2024-10-30 RX ADMIN — Medication 1 TABLET: at 08:57

## 2024-10-30 RX ADMIN — SODIUM CHLORIDE 62 ML: 9 INJECTION, SOLUTION INTRAVENOUS at 14:12

## 2024-10-30 NOTE — PLAN OF CARE
Goal Outcome Evaluation:    Orientation: A&Ox4  Aggression Stop Light: Green  Activity: A1 GB/W  Diet/BS Checks: Mod carb. BG ACHS  Tele: NA  IV Access/Drains: R PIV SL  Pain Management: Denies  Abnormal VS/Results: VSS on RA  Bowel/Bladder: Cont b/b. No BM this shift  Skin/Wounds: Scattered bruising and scabs, rash to chest and back  Consults: Hem/onc, PT/OT, ID  D/C Disposition: Pending  Other Info:  -ASHLIE drain intact, flushed with 10cc NS, has minimal output

## 2024-10-30 NOTE — PROGRESS NOTES
[facility]    Infectious Disease Progress Note    Date of Service : 10/30/2024     Assessment:  73YM with hx of hepatic cirrhosis complicated by hepatocellular carcinoma- receiving microwave therapy-last session 3 months ago, who has been hospitalized with abdominal pain following a fall on 10/16, and  Ct findings of multiple  subcapsular fluid collections which seem to be consistent with hematomas given drop in Hb from 11.8 (on 10/16) to 9.3g.dl on admission. MRI however shows Subcapsular/subdiaphragmatic complex fluid collection consistent with abscess     -S/p fall on 10/16 with abdominal pain related to multiple subcapsular hematomas / liver abscess s/p drain placement 10/26 - cultures thus far with E.coli, Klebsiella, and morganella. -Acute blood loss anemia related to above  -Leukocytosis is likely reactive   -Possible colitis on imaging, likely reactive. No clinical symptoms of infectious colitis  -Hepatocellular carcinoma, receiving microwave therapy  -Liver cirrhosis with mildly elevated liver enzymes  -Hyponatremia  -Parkinson's disease  -diabetes  -Chronic medical conditions - Htn, chronic anemia, hx of prostate cancer     Recommendations:  Continue Ertapenem for now.    Await findings of CT sinogram and CT for final antibiotic plan and drain plans.       ADDENDUM: CT with improvement in collections, sinogram without fistula. Therefore, would recommend going out on Ertapenem for 2-4 weeks. Will repeat a CT in 2 weeks and follow-up with ID after to determine duration of antibiotics. Place PICC as we are unclear at this time of duration of IV antibiotics.       Patient and plan discussed with Dr. Casas.     Evy Sandoval PA-C    Interval History   Has remained afebrile, no new complaints. Sore in mid abdomen.      Physical Exam   Temp: 98.3  F (36.8  C) Temp src: Oral BP: (!) 147/76 Pulse: 82   Resp: 20 SpO2: 93 % O2 Device: None (Room air)    Vitals:    10/24/24 0912   Weight:  72.1 kg (159 lb)     Vital Signs with Ranges  Temp:  [97.3  F (36.3  C)-98.5  F (36.9  C)] 98.3  F (36.8  C)  Pulse:  [72-82] 82  Resp:  [18-20] 20  BP: ()/(61-76) 147/76  SpO2:  [92 %-93 %] 93 %    GENERAL APPEARANCE:  awake, chronically ill appearing male  EYES: Eyes grossly normal to inspection  RESP: lungs clear   CV: S1S2, irregular  ABDOMEN: epigastric and RUQ tenderness. ASHLIE drain in place with minimal output.   SKIN: no suspicious lesions or rashes    Other:    Medications   Current Facility-Administered Medications   Medication Dose Route Frequency Provider Last Rate Last Admin     Current Facility-Administered Medications   Medication Dose Route Frequency Provider Last Rate Last Admin    carbidopa-levodopa (SINEMET)  MG per tablet 1 tablet  1 tablet Oral BID Rhonda Farmer MD   1 tablet at 10/30/24 0858    docusate sodium (COLACE) capsule 100 mg  100 mg Oral BID Rhonda Farmer MD   100 mg at 10/30/24 0857    ertapenem (INVanz) 1 g vial to attach to  mL bag  1 g Intravenous Q24H Evy Sandoval PA-C   1 g at 10/30/24 1303    methocarbamol (ROBAXIN) tablet 750 mg  750 mg Oral 4x Daily Parveen Sanders MD   750 mg at 10/30/24 1257    multivitamin w/minerals (THERA-VIT-M) tablet 1 tablet  1 tablet Oral Daily Rhonda Farmer MD   1 tablet at 10/30/24 0857    sodium chloride (PF) 0.9% PF flush 10 mL  10 mL Irrigation Q8H Sumit Zapata MD   10 mL at 10/30/24 0859    sodium chloride (PF) 0.9% PF flush 3 mL  3 mL Intracatheter Q8H Rhonda Farmer MD   3 mL at 10/30/24 1303       Data   All microbiology laboratory data reviewed.  Recent Labs   Lab Test 10/29/24  1310 10/28/24  1148 10/25/24  1440   WBC 9.9 12.5* 14.4*   HGB 9.1* 9.3* 9.3*   HCT 25.8* 27.7* 27.8*   MCV 91 93 93    375 355     Recent Labs   Lab Test 10/30/24  1050 10/29/24  1310 10/28/24  1148   CR 0.63* 0.66* 0.67     Microbiology  10/26/2024 1211 10/28/2024 1309 Abscess Aerobic Bacterial Culture Routine With Gram  Stain [[id number]]   (Abnormal)   Abscess from Abdomen    Preliminary result Component Value   Culture 3+ Morganella morganii Abnormal  P    3+ Escherichia coli Abnormal  P    2+ Klebsiella oxytoca Abnormal  P   Gram Stain Result 2+ Gram negative bacilli Abnormal  P    4+ WBC seen Abnormal  P    Predominantly PMNs             10/26/2024 1210 10/27/2024 1646 Anaerobic bacterial culture [[id number]]   Abscess from Abdomen    Preliminary result Component Value   Culture No anaerobic organisms isolated after 1 day P             10/24/2024 1303 10/28/2024 1531 Blood Culture Peripheral Blood [[id number]]   Peripheral Blood    Preliminary result Component Value   Culture No growth after 4 days P                 Imaging  EXAM: MR LIVER W/O and W CONTRAST  LOCATION: [facility]  DATE: 10/24/2024     INDICATION: History of liver cancer, s p ablation, liver lesions, also recent trauma hematoma vs abscess  COMPARISON: CT 10/24/2024  TECHNIQUE: Routine MRI liver protocol including T1 in/out phase, diffusion, multiplane T2, and dynamic T1 with IV contrast.    CONTRAST: 7mL Gadavist     FINDINGS:      LIVER: Post ablative changes involving the 3 hepatic metastasis. This results in absence of T2 signal abnormality at the prior metastatic sites with precontrast imaging demonstrating varying degrees of T1 signal indicating internal residual blood   products from the procedure. No evidence for significant enhancement involving the hepatic metastasis. 0.9 cm focus of enhancement involving the far superior aspect of the right hepatic lobe anteriorly (series 12, image 89). Subdiaphragmatic curvilinear   fluid collection over the anterior aspect of the left hepatic lobe extending down to the level of the postoperative change and measuring at maximal diameters of 5.4 x 2.2 x 4.8 cm (4/19 and 3/8). Adjacent inflammatory change in the subcapsular soft   tissues. No calcified gallstones or biliary dilatation.  Mild diffuse fatty infiltration.     ADDITIONAL FINDINGS: Pancreas, spleen and both adrenal glands unremarkable. Symmetric renal enhancement. Bilateral renal cysts. Normal caliber aorta. No lymphadenopathy.                                                                      IMPRESSION:  1.  Post ablative changes of underlying 3 hepatic metastasis which demonstrate no significant T2 signal abnormality which is resolved with new T1 signal internally indicating hemorrhagic components and no significant enhancement.     2.  Subcapsular/subdiaphragmatic complex fluid collection along the anterior aspect left hepatic lobe extending to the third metastasis and left hepatic lobe inferiorly. Dimensions as detailed above. 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   Continued attention to this on follow-up is recommended.     3.  A 0.9 cm focus of enhancement involving the anterosuperior aspect of the right hepatic lobe in the extreme subdiaphragmatic space of the right hepatic lobe. Attention to this on follow-up is recommended to assess for possible hepatic metastasis at   this location.

## 2024-10-30 NOTE — PROGRESS NOTES
"[facility]    Hospitalist Progress Note    Interval History   - Abdominal pain improved  - Management per IR, ID    Assessment & Plan   Summary: Lamine Samano is a 73 year old male with PMH hepatocellular carcinoma on microwave ablation, Parkinson's disease, alcohol use disorder, history of prostate cancer on androgen deprivation therapy, hypertension, type 2 diabetes mellitus, cirrhosis of liver, who was admitted on 10/24/2024 with abdominal pain and liver lesions of unclear etiology.    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 No fever, nausea vomiting or diarrhea, note leukocytosis to 17k on admission. CT abdomen pelvis-multiple subcapsular fluid collection with surrounding inflammatory changes, posttraumatic collection of hemorrhage versus abscess.  Multiple liver lesions, possible HCC.  Mild gallbladder wall thickening and pericholecystic fluid, could be reactive from adjacent inflammation along the liver, segmental wall thickening along the ascending and proximal transverse colon at the hepatic flexure, possible colitis which may be reactive from adjacent inflammation along the liver. Follow up liver MRI shows hepatic metastases as well as a \"Subcapsular/subdiaphragmatic complex fluid collection along the anterior aspect left hepatic lobe extending to the third metastasis and left hepatic lobe inferiorly... This likely represents a subdiaphragmatic abscess.\" HIDA scan suspicious for chronic cholecystitis. On exam he has liver enlargement and epigastric tenderness over area of liver lesions.   Seen by IR on 10/26, chronic cholecystitis felt to be unlikely from HIDA scan. Patient underwent drainage of liver lesion with pus return and confirmed to be liver abscess. IR suspects abscess due to gastric fistula related to a left hepatic lesion " ablation.   Patient with sharp abdominal pain 10/28 related to movement, position, drain placement. Abd XR shows no acute pathology.  - Appreciate ID consult   - Ertapenem   - Pending discharge ID recs--repeat imaging first  - Appreciate Surgery consult  - Appreciate IR consult   - S/p liver drain 10/26 in place   - CT sinogram prior to discharge  - Await abscess cultures and sensitivities, currently appears polymicrobial  - Pain control with p.o. oxycodone/IV Dilaudid    Hepatocellular carcinoma  History of prostate cancer  Known history of HCC status post microwave ablation, supposed to get neck cycle of treatment November, and prostate cancer on androgen deprivation therapy, follows with Dr. Dreyer from [facility]  - Oncology consult appreciated    Liver cirrhosis  History of alcohol use disorder  Hyperbilirubinemia, mild  Elevated transaminases, mild  Reports still drinking about 2 drinks a day, has cut down from previously.  Never had alcohol withdrawal before.   - No  CIWA, no significant withdrawal noted this admission  - Patient declined thiamine and folate but agreed to take multivitamin  - [facility] consulted, appreciate their recs    Parkinson's disease  - PTA Sinemet    Hyponatremia  Na 128, 127. Patient appears euvolemic. 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  - Check urine studies  - Consider fluid restriction if it drops further    Chronic anemia  Appears like over the last year hemoglobin has been around 10-12. Hemoglobin at presentation 9.9. Patient denies any active bleed.  But does have evidence of possible hematoma as above  - Monitor for further drop and if remains stable would recommend outpatient follow-up    Type 2 diabetes mellitus  - Hold PTA metformin  - Hemoglobin A1c on 9/18/2024 was 6.3  - Stop sliding scale insulin BG have been < 150    Hypertension  - Holding PTA atenolol SBP < 140s so far    Clinically Significant Risk Factors          # Hyponatremia: Lowest Na = 127 mmol/L in last 2 days, will monitor as appropriate  # Hypochloremia: Lowest Cl = 93 mmol/L in last 2 days, will monitor as appropriate      # Hypoalbuminemia: Lowest albumin = 2.7 g/dL at 10/25/2024  2:40 PM, will monitor as appropriate       # Hypertension: Noted on problem list                       PT/OT: ordered  Diet: Advance Diet as Tolerated: Regular Diet Adult; Moderate Consistent Carb (60 g CHO per Meal) Diet    DVT Prophylaxis: Pneumatic Compression Devices  Diaz Catheter: Not present  Lines: None     Cardiac Monitoring: None  Code Status: Full Code    Medically Ready for Discharge: Anticipated Tomorrow to home    - Total time spent on encounter is 35 minutes, which includes counseling patient and family, coordination of care, managing orders, interpreting tests, and/or time spent discussing with consultants.      Parveen Sanders MD  Hospitalist Service  [facility]  Securely message with [employer] (more info)  Text page via [employer] Paging/Directory       Data reviewed today: I reviewed all new labs and imaging results over the last 24 hours.    Physical Exam   Temp: 98.3  F (36.8  C) Temp src: Oral BP: (!) 147/76 Pulse: 82   Resp: 20 SpO2: 93 % O2 Device: None (Room air)    Vitals:    10/24/24 0912   Weight: 72.1 kg (159 lb)     Vital Signs with Ranges  Temp:  [97.3  F (36.3  C)-98.5  F (36.9  C)] 98.3  F (36.8  C)  Pulse:  [72-82] 82  Resp:  [18-20] 20  BP: ()/(61-76) 147/76  SpO2:  [92 %-93 %] 93 %  I/O last 3 completed shifts:  In: 440 [P.O.:440]  Out: 1475 [Urine:1480; Drains:15]  O2 requirements: none    Constitutional: Male in NAD, resting tremor  HEENT: Eyes nonicteric, oral mucosa moist  Cardiovascular: RRR, normal S1/2, no m/r/g  Respiratory: CTAB, no wheezing or crackles  Vascular: No LE pitting edema  GI: Normoactive bowel sounds, nontender, nondistended, epigastric ASHLIE drain with some pus inside  Skin/Integumen: No  rashes  Neuro/Psych: Appropriate affect and mood. A&Ox3, moves all extremities    Medications   Current Facility-Administered Medications   Medication Dose Route Frequency Provider Last Rate Last Admin     Current Facility-Administered Medications   Medication Dose Route Frequency Provider Last Rate Last Admin    carbidopa-levodopa (SINEMET)  MG per tablet 1 tablet  1 tablet Oral BID Rhonda Farmer MD   1 tablet at 10/30/24 0858    docusate sodium (COLACE) capsule 100 mg  100 mg Oral BID Rhonda Farmer MD   100 mg at 10/30/24 0857    ertapenem (INVanz) 1 g vial to attach to  mL bag  1 g Intravenous Q24H Evy Sandoval PA-C   1 g at 10/30/24 1303    methocarbamol (ROBAXIN) tablet 750 mg  750 mg Oral 4x Daily Parveen Sanders MD   750 mg at 10/30/24 1257    multivitamin w/minerals (THERA-VIT-M) tablet 1 tablet  1 tablet Oral Daily Rhonda Farmer MD   1 tablet at 10/30/24 0857    sodium chloride (PF) 0.9% PF flush 10 mL  10 mL Irrigation Q8H Sumit Zapata MD   10 mL at 10/30/24 0859    sodium chloride (PF) 0.9% PF flush 3 mL  3 mL Intracatheter Q8H Rhonda Farmer MD   3 mL at 10/30/24 1303       Data   Recent Labs   Lab 10/30/24  1050 10/29/24  1310 10/28/24  1148 10/25/24  1633 10/25/24  1440 10/24/24  1421 10/24/24  1401 10/24/24  1021   WBC  --  9.9 12.5*  --  14.4*  --   --  17.4*   HGB  --  9.1* 9.3*  --  9.3*  --   --  9.9*   MCV  --  91 93  --  93  --   --  92   PLT  --  396 375  --  355  --   --  367   INR  --   --   --   --  1.20*  --  1.25*  --    * 127* 128*  --  128*  --   --  131*   POTASSIUM 3.8 3.4 3.7  --  3.5  --   --  3.9   CHLORIDE 94* 93* 92*  --  93*  --   --  92*   CO2 19* 23 24  --  25  --   --  23   BUN 8.4 10.0 9.7  --  11.7  --   --  15.3   CR 0.63* 0.66* 0.67  --  0.79  --   --  0.85   ANIONGAP 14 11 12  --  10  --   --  16*   JORGE 8.6* 8.0* 8.0*  --  8.1*  --   --  8.4*   * 141* 124*   < > 129*   < >  --  125*   ALBUMIN  --   --   --   --  2.7*  --   --   3.0*   PROTTOTAL  --   --   --   --  6.3*  --   --  7.2   BILITOTAL  --   --   --   --  1.2  --   --  1.3*   ALKPHOS  --   --   --   --  159*  --   --  168*   ALT  --   --   --   --  5  --   --  16   AST  --   --   --   --  66*  --   --  51*   LIPASE  --   --   --   --   --   --   --  23    < > = values in this interval not displayed.       Imaging:   No results found for this or any previous visit (from the past 24 hours).

## 2024-10-30 NOTE — PLAN OF CARE
Goal Outcome Evaluation:    Orientation: A&Ox4  Aggression Stop Light: green  Activity: A1 GBW  Diet/BS Checks: moderated carb diet  Tele: NA  IV Access/Drains: R PIV SL, ASHLIE drain to RUQ flushed w/ 10mL NS no output this shift, pt was sleeping on bulb overnight  Pain Management: pt denies pain, endorses overall discomfort, refused intervention  Abnormal VS/Results: VSS on RA  Bowel/Bladder: continent b/b, no BM this shift  Skin/Wounds: Scattered bruising and scabbing, rash to chest and back, blanchable redness to coccyx  Consults: GI, ID, Hem/Onc, IR  D/C Disposition: Possible discharge today, dependent on antibiotic therapy plan for discharge following repeat CT w/ contrast and CT sinogram.

## 2024-10-31 ENCOUNTER — APPOINTMENT (OUTPATIENT)
Dept: GENERAL RADIOLOGY | Facility: CLINIC | Age: 74
DRG: 391 | End: 2024-10-31
Attending: PHYSICIAN ASSISTANT
Payer: COMMERCIAL

## 2024-10-31 ENCOUNTER — ENROLLMENT (OUTPATIENT)
Dept: HOME HEALTH SERVICES | Facility: HOME HEALTH | Age: 74
End: 2024-10-31
Payer: COMMERCIAL

## 2024-10-31 LAB
ANION GAP SERPL CALCULATED.3IONS-SCNC: 9 MMOL/L (ref 7–15)
BUN SERPL-MCNC: 7.9 MG/DL (ref 8–23)
CALCIUM SERPL-MCNC: 8.2 MG/DL (ref 8.8–10.4)
CHLORIDE SERPL-SCNC: 94 MMOL/L (ref 98–107)
CREAT SERPL-MCNC: 0.67 MG/DL (ref 0.67–1.17)
EGFRCR SERPLBLD CKD-EPI 2021: >90 ML/MIN/1.73M2
GLUCOSE BLDC GLUCOMTR-MCNC: 97 MG/DL (ref 70–99)
GLUCOSE SERPL-MCNC: 107 MG/DL (ref 70–99)
HCO3 SERPL-SCNC: 24 MMOL/L (ref 22–29)
POTASSIUM SERPL-SCNC: 3.4 MMOL/L (ref 3.4–5.3)
SODIUM SERPL-SCNC: 127 MMOL/L (ref 135–145)

## 2024-10-31 PROCEDURE — 250N000013 HC RX MED GY IP 250 OP 250 PS 637: Performed by: INTERNAL MEDICINE

## 2024-10-31 PROCEDURE — 99239 HOSP IP/OBS DSCHRG MGMT >30: CPT | Performed by: INTERNAL MEDICINE

## 2024-10-31 PROCEDURE — 250N000009 HC RX 250: Performed by: PHYSICIAN ASSISTANT

## 2024-10-31 PROCEDURE — 999N000065 XR CHEST PORT 1 VIEW

## 2024-10-31 PROCEDURE — 71045 X-RAY EXAM CHEST 1 VIEW: CPT

## 2024-10-31 PROCEDURE — 250N000011 HC RX IP 250 OP 636: Performed by: PHYSICIAN ASSISTANT

## 2024-10-31 PROCEDURE — 99232 SBSQ HOSP IP/OBS MODERATE 35: CPT | Performed by: PHYSICIAN ASSISTANT

## 2024-10-31 PROCEDURE — 80048 BASIC METABOLIC PNL TOTAL CA: CPT | Performed by: INTERNAL MEDICINE

## 2024-10-31 PROCEDURE — 999N000040 HC STATISTIC CONSULT NO CHARGE VASC ACCESS

## 2024-10-31 PROCEDURE — 272N000450 HC KIT 4FR POWER PICC SINGLE LUMEN

## 2024-10-31 PROCEDURE — 120N000001 HC R&B MED SURG/OB

## 2024-10-31 PROCEDURE — 36569 INSJ PICC 5 YR+ W/O IMAGING: CPT

## 2024-10-31 PROCEDURE — 36415 COLL VENOUS BLD VENIPUNCTURE: CPT | Performed by: INTERNAL MEDICINE

## 2024-10-31 RX ORDER — HYDROMORPHONE HYDROCHLORIDE 2 MG/1
2 TABLET ORAL EVERY 6 HOURS PRN
Qty: 15 TABLET | Refills: 0 | Status: SHIPPED | OUTPATIENT
Start: 2024-10-31

## 2024-10-31 RX ORDER — METHOCARBAMOL 750 MG/1
750 TABLET, FILM COATED ORAL 3 TIMES DAILY PRN
Qty: 20 TABLET | Refills: 0 | Status: SHIPPED | OUTPATIENT
Start: 2024-10-31

## 2024-10-31 RX ORDER — POLYETHYLENE GLYCOL 3350 17 G/17G
17 POWDER, FOR SOLUTION ORAL DAILY
Qty: 510 G | Refills: 0 | Status: SHIPPED | OUTPATIENT
Start: 2024-10-31

## 2024-10-31 RX ADMIN — ERTAPENEM SODIUM 1 G: 1 INJECTION, POWDER, LYOPHILIZED, FOR SOLUTION INTRAMUSCULAR; INTRAVENOUS at 13:02

## 2024-10-31 RX ADMIN — TRAZODONE HYDROCHLORIDE 100 MG: 100 TABLET ORAL at 17:17

## 2024-10-31 RX ADMIN — METHOCARBAMOL 750 MG: 750 TABLET ORAL at 17:09

## 2024-10-31 RX ADMIN — METHOCARBAMOL 750 MG: 750 TABLET ORAL at 13:02

## 2024-10-31 RX ADMIN — CARBIDOPA AND LEVODOPA 1 TABLET: 25; 250 TABLET ORAL at 08:29

## 2024-10-31 RX ADMIN — METHOCARBAMOL 750 MG: 750 TABLET ORAL at 21:00

## 2024-10-31 RX ADMIN — Medication 1 TABLET: at 08:29

## 2024-10-31 RX ADMIN — LIDOCAINE HYDROCHLORIDE 2 ML: 10 INJECTION, SOLUTION EPIDURAL; INFILTRATION; INTRACAUDAL; PERINEURAL at 09:30

## 2024-10-31 RX ADMIN — METHOCARBAMOL 750 MG: 750 TABLET ORAL at 08:30

## 2024-10-31 ASSESSMENT — ACTIVITIES OF DAILY LIVING (ADL)
ADLS_ACUITY_SCORE: 0
DEPENDENT_IADLS:: INDEPENDENT
ADLS_ACUITY_SCORE: 0

## 2024-10-31 NOTE — PROGRESS NOTES
"    Lenin Cueva, is a 73 year old male with a h/o HCC s/p CT ablation of 3 liver lesions at an OSH, admitted with abdominal pain and CT demonstrated multiple areas of perihepatic fluid s/p IR drain placement 10/26/24 with good outputs in the ensuing days.     There was question of a possible fistulous tract to the stomach noted during the abscessogram,   \" I am concerned that the perihepatic abscesses may be sequelae of a gastric fistula. There appears to be a thin fistulous tract between the abscess adjacent to the lower left hepatic lobe and the stomach through segment 3 of the liver, most evident on CT. The segment 3 liver lesion which was ablated in July was directly adjacent to the stomach at that time.\"     Outputs have decreased and a CT abd/pelvis with IV and CT sinogram were done 10/29/24 and reviewed with IR Dr Beth. No fistula was seen in ensuing CTs and with CT sinogram. Fluid was almost resolved and outputs have been minimal. Dr Beth felt the drain could be removed.     After explaining the procedure and answering questions the drain was removed intact without pain, gauze and tape dressing placed over the site and explained to wife Maryellen to change the dressing daily until the site has healed. Both patient and wife understand that the abscess could recur.     Total time: 20 minutes    Thanks, Tina [facility] Interventional Radiology CNP ([phone]) (phone [phone])       "

## 2024-10-31 NOTE — PROVIDER NOTIFICATION
MD Notification    Notified Person: IR    Notified Person Name: Tina    Notification Date/Time:  10/31/2024 @ 1011    Notification Interaction: Phone    Purpose of Notification: Wondering about the plan with ASHLIE drain.      Orders Received: Per Tina, IR is reviewing and will update the patient and nursing.     Comments:  @ 1058: Received call back from CE Wilder, she will discontinue the drain today, updated patient.

## 2024-10-31 NOTE — PLAN OF CARE
Goal Outcome Evaluation:         Date/Time: 10/31/2024 (1826-0515)    Mental Status: A&O x4  Activity/dangle: Up with one assist/gb/walker  Diet: Moderate carb  Pain: Denies this shift  Diaz/Voiding: Bathroom  Tele/Restraints/Iso: NA  02/LDA: PICC placed this afternoon  D/C Date: Possibly today or tomorrow pending HomeCare IV infusion  Other Info: Had BM x2 this shift.

## 2024-10-31 NOTE — PROGRESS NOTES
"Noted CXR result with radiologist's reports stating \"PICC tip is at the SVC/R atrial junction, in good position\". PICC is ready for immediate use. Bedside RN notified.  "

## 2024-10-31 NOTE — CONSULTS
Care Management Initial Consult    General Information  Assessment completed with: Patient, Spouse or significant other, Lenin and spouse Maryellen  Type of CM/SW Visit: Initial Assessment    Primary Care Provider verified and updated as needed: Yes   Readmission within the last 30 days: no previous admission in last 30 days      Reason for Consult: discharge planning  Advance Care Planning:            Communication Assessment  Patient's communication style: spoken language (English or Bilingual)    Hearing Difficulty or Deaf: no   Wear Glasses or Blind: yes    Cognitive  Cognitive/Neuro/Behavioral: WDL                      Living Environment:   People in home: spouse  Maryellen  Current living Arrangements: independent living facility      Able to return to prior arrangements: yes       Family/Social Support:  Care provided by: self  Provides care for: no one  Marital Status:   Support system: Wife  Maryellen       Description of Support System: Supportive, Involved         Current Resources:   Patient receiving home care services: No        Community Resources: None  Equipment currently used at home: other (see comments) (3 wheeled electric scooter)  Supplies currently used at home:      Employment/Financial:  Employment Status: retired, , previous service        Financial Concerns: none   Referral to Financial Worker: No       Does the patient's insurance plan have a 3 day qualifying hospital stay waiver?  No    Lifestyle & Psychosocial Needs:  Social Drivers of Health     Food Insecurity: No Food Insecurity (10/16/2024)    [facility]    Food Insecurity     Do you worry your food will run out before you are able to buy more?: 1   Depression: Not at risk (12/22/2023)    Received from [employer] [facility], [employer] [facility]    PHQ-2     PHQ-2 TOTAL SCORE: 0   Housing Stability: Low Risk  (10/16/2024)    Received  from [facility] [employer] [facility]    Housing Stability     What is your housing situation today?: 1   Tobacco Use: Medium Risk (10/26/2024)    Patient History     Smoking Tobacco Use: Former     Smokeless Tobacco Use: Unknown     Passive Exposure: Not on file   Financial Resource Strain: Low Risk  (10/16/2024)    Received from [employer] [facility] [employer] [facility]    Financial Resource Strain     Difficulty of Paying Living Expenses: 3     Difficulty of Paying Living Expenses: Not on file   Alcohol Use: Not on file   Transportation Needs: No Transportation Needs (10/16/2024)    Received from [facility] [employer] [facility]    Transportation Needs     Does lack of transportation keep you from medical appointments?: 1     Does lack of transportation keep you from work, meetings or getting things that you need?: 1   Physical Activity: Not on file   Interpersonal Safety: Not on file   Stress: Not on file   Social Connections: Socially Integrated (10/16/2024)    Received from [facility] [employer] [facility]    Social Connections     Do you often feel lonely or isolated from those around you?: 0   Health Literacy: Not on file       Functional Status:  Prior to admission patient needed assistance:   Dependent ADLs:: Independent  Dependent IADLs:: Independent       Mental Health Status:          Chemical Dependency Status:                Values/Beliefs:  Spiritual, Cultural Beliefs, Faith Practices, Values that affect care: no               Discussed  Partnership in Safe Discharge Planning  document with patient/family: No    Additional Information:  Per consult for discharge planning, met with patient and spouse Maryellen to discuss discharge plan.  Per pt, he lives at [address] [place] [address].  Per Maryellen, they receive cleaning and laundry services.  Patient reports being independent with his ADLs & IADLs and uses either a walker or 3 wheeled scooter to get around.   Discussed IV abx at discharge.  Informed patient about plan for home IV abx.  Explained home infusion and support of RN to see pt weekly for line care and blood draw.  Discussed that pt's insurance is being checked by [facility] for home IV abx coverage.  Patient confirmed that he feels comfortable administering IV abx and that his spouse would be comfortable assisting as well.   Referral sent to [facility] via [employer].      Next Steps: Await response from [facility] regarding coverage for home IV abx.    QUETA Dodge RN, BSN, OCN   Inpatient Care Coordination [address]  Office: [phone]

## 2024-10-31 NOTE — PROGRESS NOTES
Rested well overnight. Denies pain. VSS on room air. ASHLIE with minimal output. Tolerating mod carb diet. Ambulating SBA. BM x1 during the night. A&Ox4. CM/SW ordered. Plan for PICC placement today and discharge home on IV antibiotics.

## 2024-10-31 NOTE — PLAN OF CARE
"Goal Outcome Evaluation:       Patient Name: Lamine Samano  MRN: [id number]  Date of Admission: 10/24/2024  Reason for Admission: hepatic cirrhosis and hepatocellular carcinoma  Level of Care: Acute    Vitals:   BP Readings from Last 1 Encounters:   10/30/24 99/69     Pulse Readings from Last 1 Encounters:   10/30/24 87     Wt Readings from Last 1 Encounters:   10/24/24 72.1 kg (159 lb)     Ht Readings from Last 1 Encounters:   10/24/24 1.753 m (5' 9\")     Estimated body mass index is 23.48 kg/m  as calculated from the following:    Height as of this encounter: 1.753 m (5' 9\").    Weight as of this encounter: 72.1 kg (159 lb).  Temp Readings from Last 1 Encounters:   10/30/24 98.4  F (36.9  C) (Oral)       Pain: Pain goal 0 Pain Rating 0 Effective pain medication/regimen     CV Surgery Patient: No    Assessment    Resp: LS Diminished on RA  Telemetry: NSR  Neuro: A&O x4  GI/: BS+, normoactive. Adequate urine output in urinal  Skin/Wounds: Scattered bruising.  Lines/Drains: ASHLIE drain on rt side, minimal drainage.   Activity: SBA  Sleep: Adquate.  Abnormal Labs: N/A    Aggression Stop Light: Green          Patient Care Plan: Discharge tomorrow. PICC will be inserted tomorrow for at home antibiotics.                  " 20-Oct-2022 16:35

## 2024-10-31 NOTE — DISCHARGE SUMMARY
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    Hospital Course   Lamine Samano was admitted on 10/24/2024.  The following problems were addressed during his hospitalization:    Active Problems:    Hepatocellular carcinoma (H)    Generalized abdominal pain  Lamine Samano is a 73 year old male with PMH hepatocellular carcinoma on microwave ablation, Parkinson's disease, alcohol use disorder, history of prostate cancer on androgen deprivation therapy, hypertension, type 2 diabetes mellitus, cirrhosis of liver, who was admitted on 10/24/2024 with abdominal pain and liver lesions of unclear etiology.     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 No fever, nausea vomiting or diarrhea, note leukocytosis to 17k on admission. CT abdomen pelvis-multiple subcapsular fluid collection with surrounding inflammatory changes, posttraumatic collection of hemorrhage versus abscess.  Multiple liver lesions, possible HCC.  Mild gallbladder wall thickening and pericholecystic fluid, could be reactive from adjacent inflammation along the liver, segmental wall thickening along the ascending and proximal transverse colon at the hepatic flexure, possible colitis which may be reactive from adjacent inflammation along the liver. Follow up liver MRI shows hepatic metastases as well as a \"Subcapsular/subdiaphragmatic complex fluid collection along the anterior aspect left hepatic lobe extending to the third metastasis and left hepatic lobe inferiorly... This likely represents " "a subdiaphragmatic abscess.\" HIDA scan suspicious for chronic cholecystitis. On exam he has liver enlargement and epigastric tenderness over area of liver lesions.   Seen by IR on 10/26, chronic cholecystitis felt to be unlikely from HIDA scan. Patient underwent drainage of liver lesion with pus return and confirmed to be liver abscess. 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  Had sharp abdominal pain on 10/28 related to movement, abdominal x-ray did not show any acute pathology.  Seen by infectious disease, they recommended ertapenem for antibiotics, they also recommended 2 weeks of IV antibiotics, and repeat CT abdomen and CT sinogram was obtained prior to discharge and no fistula was noted, size of the abscess had also reduced, plan is to repeat CT in 2 weeks and follow-up with infectious disease outpatient while patient will be discharged with 2-4 weeks of antibiotics.  PICC line was placed on 10/31, care team is trying to arrange IV antibiotics outpatient.  The abscess cultures appears polymicrobial.  Pain was well-controlled with oral Dilaudid.  Patient was seen by interventional radiology, general surgery, oncology and infectious disease teams.       Hepatocellular carcinoma  History of prostate cancer  Known history of HCC status post microwave ablation, supposed to get neck cycle of treatment November, and prostate cancer on androgen deprivation therapy, follows with Dr. Dreyer from [facility]  - Oncology consult appreciated     Liver cirrhosis  History of alcohol use disorder  Hyperbilirubinemia, mild  Elevated transaminases, mild  Reports still drinking about 2 drinks a day, has cut down from previously.  Never had alcohol withdrawal before.  Patient did not have any signs of withdrawal this admission, he had declined thiamine and folate with he will continue taking multivitamins outpatient.  Seen by [facility] this admission.       Parkinson's disease  - PTA " Sinemet     Hyponatremia  Na 128, 127. Patient appears euvolemic. 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  Patient does have a urine sodium of 55 and a low serum osmolality directing more towards SIADH.  Will repeat labs in few days.       Chronic anemia  Appears like over the last year hemoglobin has been around 10-12. Hemoglobin at presentation 9.9. Patient denies any active bleed.  But does have evidence of possible hematoma as above  - Monitor for further drop and if remains stable would recommend outpatient follow-up     Type 2 diabetes mellitus  - Hemoglobin A1c on 9/18/2024 was 6.3  - Stop sliding scale insulin BG have been < 150     Hypertension  PTA meds restarted, he was on atenolol PTA.    Rebeca Oliveira MD    Significant Results and Procedures       Pending Results   These results will be followed up by infectious disease team.  Unresulted Labs Ordered in the Past 30 Days of this Admission       Date and Time Order Name Status Description    10/26/2024 12:05 PM Anaerobic bacterial culture Preliminary             Code Status   Full Code       Primary Care Physician   DELANEY FAM    Physical Exam   Temp: 97.8  F (36.6  C) Temp src: Axillary BP: 124/78 Pulse: 89   Resp: 16 SpO2: 95 % O2 Device: None (Room air)    Vitals:    10/24/24 0912   Weight: 72.1 kg (159 lb)     Vital Signs with Ranges  Temp:  [97.8  F (36.6  C)-98.4  F (36.9  C)] 97.8  F (36.6  C)  Pulse:  [74-89] 89  Resp:  [16-18] 16  BP: ()/(69-78) 124/78  SpO2:  [93 %-95 %] 95 %  I/O last 3 completed shifts:  In: 70 [P.O.:60; I.V.:10]  Out: 1403 [Urine:1400; Drains:3]    The patient was examined on the day of discharge.    Discharge Disposition   Discharged to home with home care, home infusion  Condition at discharge: Stable    Consultations This Hospital Stay   GASTROENTEROLOGY IP CONSULT  HEMATOLOGY & ONCOLOGY IP CONSULT  INFECTIOUS DISEASES IP CONSULT  SURGERY GENERAL IP  CONSULT  HEMATOLOGY & ONCOLOGY IP CONSULT  PHYSICAL THERAPY ADULT IP CONSULT  OCCUPATIONAL THERAPY ADULT IP CONSULT  INTERVENTIONAL RADIOLOGY ADULT/PEDS IP CONSULT  INTERVENTIONAL RADIOLOGY ADULT/PEDS IP CONSULT  VASCULAR ACCESS ADULT IP CONSULT  CARE MANAGEMENT / SOCIAL WORK IP CONSULT    Time Spent on this Encounter   I, Rebeca Oliveira MD, personally saw the patient today and spent greater than 30 minutes discharging this patient.    Discharge Orders      CT Abdomen Pelvis w Contrast     IR Abscess Tube Check     Home Infusion Referral      Home Care Referral      Reason for your hospital stay    Liver abscess     Follow-up and recommended labs and tests     Follow up with primary care provider, DELANEY FAM, within 7 days for hospital follow- up.  The following labs/tests are recommended: CMP and CBC in 4 to 5 days, follow-up with infectious disease as recommended with CT abdomen images, also follow-up with the interventional radiology as recommended regarding the drain..     Activity    Your activity upon discharge: activity as tolerated     Diet    Follow this diet upon discharge: Current Diet:Orders Placed This Encounter      Advance Diet as Tolerated: Regular Diet Adult; Moderate Consistent Carb (60 g CHO per Meal) Diet     Discharge Medications   Current Discharge Medication List        START taking these medications    Details   ertapenem (INVANZ) 1 GM vial Inject 1 g over 30 minutes into the vein every 24 hours for 27 days. Please check CBC with diff, creatinine, AST, and CRP weekly and fax results to [facility].    Associated Diagnoses: Perihepatic abscess (H)      HYDROmorphone (DILAUDID) 2 MG tablet Take 1 tablet (2 mg) by mouth every 6 hours as needed for severe pain (IF pain not managed with non-pharmacological and non-opioid interventions).  Qty: 15 tablet, Refills: 0    Associated Diagnoses: Perihepatic abscess (H)      methocarbamol (ROBAXIN) 750 MG tablet Take 1 tablet (750 mg) by mouth  3 times daily as needed for muscle spasms.  Qty: 20 tablet, Refills: 0    Associated Diagnoses: Perihepatic abscess (H)      polyethylene glycol (MIRALAX) 17 GM/Dose powder Take 17 g by mouth daily.  Qty: 510 g, Refills: 0    Comments: Stop if there is diarrhea  Associated Diagnoses: Perihepatic abscess (H)      sodium chloride, PF, 0.9% PF flush Irrigate with 10 mLs as directed daily.  Qty: 100 mL, Refills: 1    Comments: Dispense 10 syringes  Associated Diagnoses: Perihepatic abscess (H)           CONTINUE these medications which have NOT CHANGED    Details   carbidopa-levodopa (SINEMET)  MG tablet Take 1 tablet by mouth 2 times daily      doxepin (SINEQUAN) 10 MG capsule Take 10 mg by mouth. Patient takes either doxepin & hydroxyzine OR trazodone QHS      folic acid (FOLVITE) 1 MG tablet Take 1 mg by mouth daily      furosemide (LASIX) 20 MG tablet Take 20 mg by mouth daily as needed.      hydrOXYzine (ATARAX) 50 MG tablet Take 50 mg by mouth. Patient either takes doxepin & hydroxyzine or trazodone QHS      traZODone (DESYREL) 100 MG tablet [TRAZODONE (DESYREL) 100 MG TABLET] TAKE 2 TABLETS BY MOUTH EVERY NIGHT AT BEDTIME  Qty: 180 tablet, Refills: 1    Associated Diagnoses: Insomnia, unspecified           Allergies   Allergies   Allergen Reactions    Levofloxacin Swelling     Ruptured both achilles tendons     Penicillins Hives     Data   Most Recent 3 CBC's:  Recent Labs   Lab Test 10/29/24  1310 10/28/24  1148 10/25/24  1440   WBC 9.9 12.5* 14.4*   HGB 9.1* 9.3* 9.3*   MCV 91 93 93    375 355      Most Recent 3 BMP's:  Recent Labs   Lab Test 10/31/24  0744 10/30/24  1050 10/29/24  1310 10/28/24  1148   NA  --  127* 127* 128*   POTASSIUM  --  3.8 3.4 3.7   CHLORIDE  --  94* 93* 92*   CO2  --  19* 23 24   BUN  --  8.4 10.0 9.7   CR  --  0.63* 0.66* 0.67   ANIONGAP  --  14 11 12   JORGE  --  8.6* 8.0* 8.0*   GLC 97 130* 141* 124*     Most Recent 2 LFT's:  Recent Labs   Lab Test 10/25/24  1441  10/24/24  1021   AST 66* 51*   ALT 5 16   ALKPHOS 159* 168*   BILITOTAL 1.2 1.3*     Most Recent INR's and Anticoagulation Dosing History:  Anticoagulation Dose History          Latest Ref Rng & Units 10/24/2024 10/25/2024   Recent Dosing and Labs   INR 0.85 - 1.15 1.25  1.20       Details                 Most Recent 3 Troponin's:No lab results found.  Most Recent Cholesterol Panel:  Recent Labs   Lab Test 03/24/17  0819   CHOL 189      HDL 57   TRIG 117     Most Recent 6 Bacteria Isolates From Any Culture (See EPIC Reports for Culture Details):No lab results found.  Most Recent TSH, T4 and A1c Labs:No lab results found.  Results for orders placed or performed during the hospital encounter of 10/24/24   CT Abdomen Pelvis w Contrast    Narrative    CT ABDOMEN PELVIS W CONTRAST 10/24/2024 11:24 AM    CLINICAL HISTORY: diffuse pain, anterior trauma last week, h/o CA    TECHNIQUE: CT scan of the abdomen and pelvis was performed following  injection of IV contrast. Multiplanar reformats were obtained. Dose  reduction techniques were used.  CONTRAST: 80mL Isovue-370    COMPARISON: CT abdomen and pelvis performed on 10/20/2023, 1/29/2024    FINDINGS:   LOWER CHEST: Subpleural fibrotic changes are again seen along the lung  bases with superimposed subsegmental atelectasis.    HEPATOBILIARY: Diffuse hepatic steatosis is present with nodular  contour compatible with cirrhosis. Enlarging mass is seen within the  right hepatic lobe measuring 2.2 cm compared with the prior  measurement of approximately 1.2 cm (series 3, image 23). Another 2.7  cm hypoattenuating lesion along the anterior right hepatic lobe  previously measured 1.7 cm (series 3, image 25) and may reflect an  ablation cavity from recent microwave ablation. 2.2 x 1.9 cm  ill-defined mass in the left hepatic lobe appears more heterogenous  and decreased from the prior measurement of 2.6 x 2.5 cm (series 3,  image 21). Multiple new subcapsular fluid  collections are seen along  the anterior surface of the liver. The largest collection measures up  to 5.5 x 2.8 cm along the anterior left hepatic lobe (series 3, image  20). There are at least 4 other collections seen measuring 5.2 x 2.6  cm, 2.9 x 2.3 cm, 3.3 x 3.5 cm and 4.0 x 1.1 cm (series 3, images 11,  14, 22 and 29). Moderate surrounding fat stranding and soft tissue  thickening is also seen. Air and gas containing collection measuring  3.3 x 1.5 cm is noted along the inferior right hepatic lobe adjacent  to the inflamed hepatic flexure of the colon described below (series  5, image 42). Gallbladder appears mildly distended measuring up to 3.5  cm in diameter with wall thickening and surrounding fluid.    PANCREAS: No significant mass, duct dilatation, or inflammatory  change.    SPLEEN: Normal size.    ADRENAL GLANDS: No significant nodules.    KIDNEYS/BLADDER: No hydronephrosis is present. Stable subcentimeter  hypodense lesions on both kidneys which are too small to characterize  and favored to reflect small cysts.    BOWEL: Colonic diverticulosis without evidence of diverticulitis.  Segmental wall thickening is seen along the ascending and proximal  transverse colon at the hepatic flexure with mild surrounding fat  stranding (series 3, image 32). No evidence of bowel obstruction is  present.    PELVIC ORGANS: Dystrophic calcification is seen in the prostate gland.  Stable small fat-containing right inguinal hernia. Previously noted  hyperdense focus within the right posterior peripheral zone of the  prostate gland appears less conspicuous.    ADDITIONAL FINDINGS: Severe vascular calcifications are seen in the  abdominal aorta and iliac branches.    MUSCULOSKELETAL: Multilevel degenerative changes are present in the  spine. Remote fracture seen along several right posterior lower ribs  as well as the right L1 transverse process.      Impression    IMPRESSION:   1.  Cirrhotic appearance of the liver as  described above. Multiple  subcapsular fluid collections are seen along the anterior portion of  the liver with moderate surrounding inflammatory changes. Findings may  reflect posttraumatic collections with hemorrhage given history of  recent trauma. Abscess collections are also in the differential  diagnosis. At least one collection along the inferior right hepatic  lobe has internal locule of gas as well as peripheral enhancement  suspicious for an abscess.  2.  Multiple liver lesions are present including an enlarging 2.2 cm  mass in the right hepatic lobe suspicious for malignancy.  Characterization of these lesions is limited due to single phase exam  however. A 2.2 cm lesion in the left hepatic lobe appears more  heterogenous and decreased in size from the prior exam. Furthermore,  another 2.7 cm hypoattenuating lesion in the right hepatic lobe  appears increased in size but more fluidlike in appearance. Both of  these lesions may reflect ablation cavities from recent microwave  ablation for known hepatocellular carcinoma. Recommend further  characterization of these findings with follow-up MRI liver protocol  exam.  3.  Mild gallbladder wall thickening and pericholecystic fluid.  Findings may be reactive from adjacent inflammation along the liver  described above. Cholecystitis however cannot be excluded. Suggest  correlation with laboratory values and consider follow-up abdominal  ultrasound.  4.  Segmental wall thickening is seen along the ascending and proximal  transverse colon at the hepatic flexure. Findings are suggestive of  colitis which may be reactive from adjacent inflammation along the  liver. Infectious versus infiltrate colitis cannot be excluded. An  underlying colonic mass is considered to be less likely but cannot be  excluded given the focal area of wall thickening. If further concern,  follow-up colonoscopy can be considered after acute symptoms resolve.    MONICA KHAN MD          SYSTEM ID:  [id number]   MR Liver wo & w Contrast    Narrative    EXAM: MR LIVER W/O and W CONTRAST  LOCATION: [facility]  DATE: 10/24/2024    INDICATION: History of liver cancer, s p ablation, liver lesions, also recent trauma hematoma vs abscess  COMPARISON: CT 10/24/2024  TECHNIQUE: Routine MRI liver protocol including T1 in/out phase, diffusion, multiplane T2, and dynamic T1 with IV contrast.    CONTRAST: 7mL Gadavist    FINDINGS:     LIVER: Post ablative changes involving the 3 hepatic metastasis. This results in absence of T2 signal abnormality at the prior metastatic sites with precontrast imaging demonstrating varying degrees of T1 signal indicating internal residual blood   products from the procedure. No evidence for significant enhancement involving the hepatic metastasis. 0.9 cm focus of enhancement involving the far superior aspect of the right hepatic lobe anteriorly (series 12, image 89). Subdiaphragmatic curvilinear   fluid collection over the anterior aspect of the left hepatic lobe extending down to the level of the postoperative change and measuring at maximal diameters of 5.4 x 2.2 x 4.8 cm (4/19 and 3/8). Adjacent inflammatory change in the subcapsular soft   tissues. No calcified gallstones or biliary dilatation. Mild diffuse fatty infiltration.    ADDITIONAL FINDINGS: Pancreas, spleen and both adrenal glands unremarkable. Symmetric renal enhancement. Bilateral renal cysts. Normal caliber aorta. No lymphadenopathy.      Impression    IMPRESSION:  1.  Post ablative changes of underlying 3 hepatic metastasis which demonstrate no significant T2 signal abnormality which is resolved with new T1 signal internally indicating hemorrhagic components and no significant enhancement.    2.  Subcapsular/subdiaphragmatic complex fluid collection along the anterior aspect left hepatic lobe extending to the third metastasis and left hepatic lobe inferiorly. Dimensions as detailed  above. 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   Continued attention to this on follow-up is recommended.    3.  A 0.9 cm focus of enhancement involving the anterosuperior aspect of the right hepatic lobe in the extreme subdiaphragmatic space of the right hepatic lobe. Attention to this on follow-up is recommended to assess for possible hepatic metastasis at   this location.   NM HepatOBiliary Scan    Narrative    EXAM: NM HEPATOBILIARY SCAN  LOCATION: [facility]  DATE: 10/25/2024    INDICATION: Right upper quadrant abdominal pain  COMPARISON: Liver MRI dated 10/24/2024.  TECHNIQUE: 7.0 mCi of Tc-99m mebrofenin, IV. Anterior planar of the abdomen. Delayed imaging 100 minutes post injection was obtained and reviewed    FINDINGS: Normal radionuclide activity in liver, bile ducts, and small bowel with nonvisualization of the gallbladder after 60 minutes, however the gallbladder was not identified excluding acute cystic duct obstruction but indicating chronic gallbladder   inflammation (chronic cholecystitis)      Impression    IMPRESSION:     Findings suspicious for chronic cholecystitis.   IR Peritoneal Abscess Drainage    Narrative    [facility]  LOCATION: [facility]  DATE: 10/26/2024    PROCEDURE: US AND FLUOROSCOPY GUIDED DRAINAGE CATHETER PLACEMENT    INTERVENTIONAL RADIOLOGIST: Sumit Zapata MD.    INDICATION: 73-year-old man with history of hepatocellular carcinoma  status post microwave ablation liver lesions 6/8/2024. Presents with  upper abdominal pain, found to have perihepatic fluid collection  suspected B abscess along the left hepatic lobe. Also has a small  abscess adjacent to the gallbladder. Presents for drainage catheter  placement of the left perihepatic abscesses. Cholecystostomy tube  placement was also discussed, however HIDA scan showed patent cystic  duct and any gallbladder inflammation is probably related to the  adjacent abscess and unlikely to  benefit from a cholecystostomy tube.    CONSENT: The risks, benefits and alternatives of imaging guided  abscess drainage catheter placement were discussed with the patient   in detail. All questions were answered. Informed consent was given to  proceed with the procedure.    MODERATE SEDATION: Versed 1 mg IV; Fentanyl 50 mcg IV.  Under  physician supervision, Versed and fentanyl were administered for  moderate sedation. Pulse oximetry, heart rate and blood pressure were  continuously monitored by an independent trained observer. The  physician spent 21 minutes of face-to-face sedation time with the  patient.    CONTRAST: 20 cc into the abscess  ANTIBIOTICS: None  ADDITIONAL MEDICATIONS: None    FLUOROSCOPIC TIME: 1.6 minutes.  RADIATION DOSE: Air Kerma: 25.4 mGy    COMPLICATIONS: No immediate complications.    STERILE BARRIER TECHNIQUE: Maximum sterile barrier technique was used.  Cutaneous antisepsis was performed at the operative site with  application of 2% chlorhexidine and large sterile drape. Prior to the  procedure, the  and assistant performed hand hygiene and wore  hat, mask, sterile gown, and sterile gloves during the entire  procedure.    PROCEDURE: A limited ultrasound of the upper abdomen demonstrate  hypoechoic fluid collection adjacent to the left hepatic lobe. This  was targeted for drainage catheter placement.    The overlying skin and subcutaneous soft tissues were anesthetized  using 1% lidocaine. Under US guidance a 5 Mongolian Yueh needle was  inserted into the fluid collection. Contrast was injected through the  needle to outline the fluid collection. A wire was advanced and coiled  within the fluid collection. Following tract dilation, a 12 Mongolian  drainage catheter was advanced into the collection and pigtail  catheter loop was formed. Catheter was secured to the skin..   10 mL  of purulent fluid was removed and sent for culture and sensitivity.  Contrast was injected through the drain  and sinogram was performed  which demonstrated the loop of the catheter to be within the fluid  collection. No immediate complications.    FINDINGS:  12 Fr drain placed into left perihepatic abscess. Drain injection  showed the left suzy-hepatic abscesses appeared to communicate with  each other.       Impression    IMPRESSION:    1. Successful ultrasound and fluoroscopic guided perihepatic abscess  drain placement using a 12F locking pigtail catheter.  2. 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 There  appears to be a thin fistulous tract between the abscess adjacent to  the lower left hepatic lobe and the stomach through segment 3 of the  liver, most evident on CT. The segment 3 liver lesion which was  ablated in July was directly adjacent to the stomach at that time.     PLAN:  1. Patient to be recovered on the floor.  2. Left perihepatic drain to bulb suction.  3. Flush with 10 mL NS tid.  4. Follow-up with IR once drain outputs are < 10 mL/day x 2 days with  CT w/ contrast of the abdomen/pelvis and drain sinogram.     Sumit Zapata MD         SYSTEM ID:  [id number]   XR Abdomen 2 Views    Narrative    ABDOMEN TWO-THREE VIEW  10/28/2024 9:42 AM     HISTORY: Sharp abdominal pain post drain.    COMPARISON: MR Liver 10/24/2024. CT abdomen and pelvis 10/24/2024.      Impression    IMPRESSION: Percutaneous drain projects over the mid upper abdomen.  Nonobstructive bowel gas pattern. Small amount of stool throughout the  colon. Limited evaluation for free air given supine technique.  Degenerative changes of the spine.     EH PERDOMO MD         SYSTEM ID:  [id number]   CT Abdomen Pelvis w Contrast    Narrative    CT ABDOMEN PELVIS W CONTRAST 10/30/2024 2:24 PM    CLINICAL HISTORY: Liver abscess follow-up.    TECHNIQUE: CT scan of the abdomen and pelvis was performed following  injection of IV contrast. Multiplanar  reformats were obtained. Dose  reduction techniques were used.    CONTRAST: 78 mL Isovue-370    COMPARISON: 10/24/2024    FINDINGS:   LOWER CHEST: New trace bilateral pleural effusions.    HEPATOBILIARY: Mild gallbladder distention. Postablation changes in  the right and left hepatic lobes again seen. There is a new  percutaneous drain anterior to the liver with near-complete resolution  of the previously seen fluid collection. There is a small 2.8 x 1.2 cm  collection adjacent to the catheter, and an additional 3 x 1 cm  collection adjacent to the pigtail loop anterior to the liver.    PANCREAS: Normal.    SPLEEN: Normal.    ADRENAL GLANDS: Normal.    KIDNEYS/BLADDER: Normal.    BOWEL: Sigmoid colonic diverticulosis.    PELVIC ORGANS: Normal.    ADDITIONAL FINDINGS: The right superficial femoral artery is occluded  in the upper leg. The profunda femoral artery is patent.    MUSCULOSKELETAL: Normal.      Impression    IMPRESSION:   1.  Significant improvement in perihepatic fluid collections status  post placement of drainage catheter. No acute abnormality.  2.  Incidental note of an occluded right superficial femoral artery,  unchanged from previous.    QUIQUE MURCIA MD         SYSTEM ID:  [id number]   CT Sinogram Injection    Narrative    CT SINOGRAM INJECTION  10/30/2024 2:25 PM     HISTORY:  The patient is status post a perihepatic drainage catheter  placement.    COMPARISON: CT of the abdomen and pelvis with intravenous contrast  performed on 10/30/2024. CT of the abdomen and pelvis dated  10/24/2024.    TECHNIQUE: CT sinogram of the abdomen was performed without contrast  and following the administration of contrast through a perihepatic  drain. Radiation dose for this scan was reduced using automated  exposure control, adjustment of the mA and/or kV according to patient  size, or iterative reconstruction technique.    FINDINGS: Again identified is a drain located anterior to the left  lobe of liver. There has  been a significant decrease in size of the  perihepatic abscess cavity. Post administration of contrast, there is  primarily filling of the abscess cavity but no entry into the biliary  tree or bowel. There remains some inflammatory fat stranding about the  abscess cavity.    Additional findings: Please refer to the CT scan performed on the same  day.      Impression    IMPRESSION: Interval decrease in size of a perihepatic abscess cavity.  No demonstration of the fistula from the abscess cavity to the biliary  tree or bowel.    LAW MCGOWAN MD         SYSTEM ID:  [id number]   XR Chest Port 1 View    Narrative    XR CHEST PORT 1 VIEW 10/31/2024 11:21 AM    HISTORY: RN placed PICC - verify tip placement    COMPARISON: None.      Impression    IMPRESSION: Right arm PICC tip is in the mid right atrium. Recommend  retracting the catheter by about 4.5 cm for more optimal positioning.    WINSOME DEE MD         SYSTEM ID:  [id number]

## 2024-10-31 NOTE — PROCEDURES
[facility]    Single Lumen PICC Placement    Date/Time: 10/31/2024 9:30 AM    Performed by: Evy Sandoval PA-C  Authorized by: Rhonda Farmer MD  Indications: vascular access      UNIVERSAL PROTOCOL   Site Marked: Yes  Prior Images Obtained and Reviewed:  Yes  Required items: Required blood products, implants, devices and special equipment available    Patient identity confirmed:  Verbally with patient, arm band and hospital-assigned identification number  NA - No sedation, light sedation, or local anesthesia  Confirmation Checklist:  Patient's identity using two indicators, relevant allergies, procedure was appropriate and matched the consent or emergent situation and correct equipment/implants were available  Time out: Immediately prior to the procedure a time out was called    Universal Protocol: the Joint Commission Universal Protocol was followed    Preparation: Patient was prepped and draped in usual sterile fashion    ESBL (mL):  0     ANESTHESIA    Anesthesia:  Local infiltration  Local Anesthetic:  Lidocaine 1% without epinephrine  Anesthetic Total (mL):  2      SEDATION    Patient Sedated: No        Preparation: skin prepped with ChloraPrep  Skin prep agent: skin prep agent completely dried prior to procedure  Sterile barriers: maximum sterile barriers were used: cap, mask, sterile gown, sterile gloves, and large sterile sheet  Hand hygiene: hand hygiene performed prior to central venous catheter insertion  Type of line used: PICC  Catheter type: single lumen  Lumen type: power PICC  Catheter size: 4 Fr  Brand: Bard  Lot number: [id number]  Placement method: venipuncture, MST and ultrasound  Number of attempts: 1  Difficulty threading catheter: no  Successful placement: yes  Orientation: right    Location: basilic vein  Arm circumference: adults 10 cm  Extremity circumference: 24  Visible catheter length: 2  Total catheter length: 45  Dressing and securement: chlorhexidine  disc applied, blood cleaned with CHG, occlusive dressing applied, site cleansed, securement device, subcutaneous anchor securement system, sterile dressing applied and transparent dressing  Post procedure assessment: blood return through all ports and free fluid flow  PROCEDURE   Patient Tolerance:  Patient tolerated the procedure well with no immediate complicationsDescribe Procedure: Successful placement of single lumen PICC RUE basilic vein. Good blood return and flushing ability. Awaiting CXR for placement verification. Bedside RN notified.  Disposal: sharps and needle count correct at the end of procedure, needles and guidewire disposed in sharps container

## 2024-10-31 NOTE — PROGRESS NOTES
[facility]    Infectious Disease Progress Note    Date of Service : 10/31/2024     Assessment:  73YM with hx of hepatic cirrhosis complicated by hepatocellular carcinoma- receiving microwave therapy-last session 3 months ago, who has been hospitalized with abdominal pain following a fall on 10/16, and  Ct findings of multiple  subcapsular fluid collections which seem to be consistent with hematomas given drop in Hb from 11.8 (on 10/16) to 9.3g.dl on admission. MRI however shows Subcapsular/subdiaphragmatic complex fluid collection consistent with abscess     -S/p fall on 10/16 with abdominal pain related to multiple subcapsular hematomas / liver abscess s/p drain placement 10/26 - cultures thus far with E.coli, Klebsiella, and morganella. -Acute blood loss anemia related to above  -Leukocytosis is likely reactive   -Possible colitis on imaging, likely reactive. No clinical symptoms of infectious colitis  -Hepatocellular carcinoma, receiving microwave therapy  -Liver cirrhosis with mildly elevated liver enzymes  -Hyponatremia  -Parkinson's disease  -diabetes  -Chronic medical conditions - Htn, chronic anemia, hx of prostate cancer     Recommendations:  CT with improvement in collections, sinogram without fistula. Therefore, would recommend going out on Ertapenem for 2-4 weeks. Orders placed in discharge navigator.   Will repeat a CT in 2 weeks and follow-up with ID after to determine duration of antibiotics. ID will schedule this upon his discharge.   PICC placed today.       Patient and plan discussed with Dr. Casas.     Evy Sandoval PA-C    Interval History   Has remained afebrile, no new complaints. ASHLIE drain now removed.       Physical Exam   Temp: 97.8  F (36.6  C) Temp src: Axillary BP: 124/78 Pulse: 89   Resp: 16 SpO2: 95 % O2 Device: None (Room air)    Vitals:    10/24/24 0912   Weight: 72.1 kg (159 lb)     Vital Signs with Ranges  Temp:  [97.8  F (36.6  C)-98.4  F (36.9  C)] 97.8  F  (36.6  C)  Pulse:  [74-89] 89  Resp:  [16-18] 16  BP: ()/(69-78) 124/78  SpO2:  [93 %-95 %] 95 %    GENERAL APPEARANCE:  awake, chronically ill appearing male  EYES: Eyes grossly normal to inspection  RESP: lungs clear   CV: S1S2, irregular  ABDOMEN: epigastric and RUQ tenderness. ASHLIE drain removed earlier.   SKIN: no suspicious lesions or rashes    Other:    Medications   Current Facility-Administered Medications   Medication Dose Route Frequency Provider Last Rate Last Admin     Current Facility-Administered Medications   Medication Dose Route Frequency Provider Last Rate Last Admin    carbidopa-levodopa (SINEMET)  MG per tablet 1 tablet  1 tablet Oral BID Rhonda Farmer MD   1 tablet at 10/31/24 0829    docusate sodium (COLACE) capsule 100 mg  100 mg Oral BID Rhonda Farmer MD   100 mg at 10/30/24 2140    ertapenem (INVanz) 1 g vial to attach to  mL bag  1 g Intravenous Q24H Evy Sandoval PA-C   1 g at 10/30/24 1303    methocarbamol (ROBAXIN) tablet 750 mg  750 mg Oral 4x Daily Parveen Sanders MD   750 mg at 10/31/24 0830    multivitamin w/minerals (THERA-VIT-M) tablet 1 tablet  1 tablet Oral Daily Rhonda Farmer MD   1 tablet at 10/31/24 0829    sodium chloride (PF) 0.9% PF flush 10 mL  10 mL Irrigation Q8H Sumit Zapata MD   10 mL at 10/31/24 0834    sodium chloride (PF) 0.9% PF flush 10-40 mL  10-40 mL Intracatheter Q7 Days Evy Sandoval PA-C        sodium chloride (PF) 0.9% PF flush 3 mL  3 mL Intracatheter Q8H Rhonda Farmer MD   10 mL at 10/30/24 2141       Data   All microbiology laboratory data reviewed.  Recent Labs   Lab Test 10/29/24  1310 10/28/24  1148 10/25/24  1440   WBC 9.9 12.5* 14.4*   HGB 9.1* 9.3* 9.3*   HCT 25.8* 27.7* 27.8*   MCV 91 93 93    375 355     Recent Labs   Lab Test 10/30/24  1050 10/29/24  1310 10/28/24  1148   CR 0.63* 0.66* 0.67     Microbiology  10/26/2024 1211 10/28/2024 1309 Abscess Aerobic Bacterial Culture Routine With Gram Stain  [[id number]]   (Abnormal)   Abscess from Abdomen    Preliminary result Component Value   Culture 3+ Morganella morganii Abnormal  P    3+ Escherichia coli Abnormal  P    2+ Klebsiella oxytoca Abnormal  P   Gram Stain Result 2+ Gram negative bacilli Abnormal  P    4+ WBC seen Abnormal  P    Predominantly PMNs             10/26/2024 1210 10/27/2024 1646 Anaerobic bacterial culture [[id number]]   Abscess from Abdomen    Preliminary result Component Value   Culture No anaerobic organisms isolated after 1 day P             10/24/2024 1303 10/28/2024 1531 Blood Culture Peripheral Blood [[id number]]   Peripheral Blood    Preliminary result Component Value   Culture No growth after 4 days P                 Imaging  EXAM: MR LIVER W/O and W CONTRAST  LOCATION: [facility]  DATE: 10/24/2024     INDICATION: History of liver cancer, s p ablation, liver lesions, also recent trauma hematoma vs abscess  COMPARISON: CT 10/24/2024  TECHNIQUE: Routine MRI liver protocol including T1 in/out phase, diffusion, multiplane T2, and dynamic T1 with IV contrast.    CONTRAST: 7mL Gadavist     FINDINGS:      LIVER: Post ablative changes involving the 3 hepatic metastasis. This results in absence of T2 signal abnormality at the prior metastatic sites with precontrast imaging demonstrating varying degrees of T1 signal indicating internal residual blood   products from the procedure. No evidence for significant enhancement involving the hepatic metastasis. 0.9 cm focus of enhancement involving the far superior aspect of the right hepatic lobe anteriorly (series 12, image 89). Subdiaphragmatic curvilinear   fluid collection over the anterior aspect of the left hepatic lobe extending down to the level of the postoperative change and measuring at maximal diameters of 5.4 x 2.2 x 4.8 cm (4/19 and 3/8). Adjacent inflammatory change in the subcapsular soft   tissues. No calcified gallstones or biliary dilatation. Mild  diffuse fatty infiltration.     ADDITIONAL FINDINGS: Pancreas, spleen and both adrenal glands unremarkable. Symmetric renal enhancement. Bilateral renal cysts. Normal caliber aorta. No lymphadenopathy.                                                                      IMPRESSION:  1.  Post ablative changes of underlying 3 hepatic metastasis which demonstrate no significant T2 signal abnormality which is resolved with new T1 signal internally indicating hemorrhagic components and no significant enhancement.     2.  Subcapsular/subdiaphragmatic complex fluid collection along the anterior aspect left hepatic lobe extending to the third metastasis and left hepatic lobe inferiorly. Dimensions as detailed above. 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   Continued attention to this on follow-up is recommended.     3.  A 0.9 cm focus of enhancement involving the anterosuperior aspect of the right hepatic lobe in the extreme subdiaphragmatic space of the right hepatic lobe. Attention to this on follow-up is recommended to assess for possible hepatic metastasis at   this location.

## 2024-10-31 NOTE — PLAN OF CARE
Goal Outcome Evaluation:      Plan of Care Reviewed With: patient, spouse          Outcome Evaluation: Discharge home with IV abx

## 2024-11-01 VITALS
SYSTOLIC BLOOD PRESSURE: 101 MMHG | TEMPERATURE: 98.4 F | HEIGHT: 69 IN | RESPIRATION RATE: 16 BRPM | OXYGEN SATURATION: 95 % | HEART RATE: 84 BPM | BODY MASS INDEX: 23.55 KG/M2 | DIASTOLIC BLOOD PRESSURE: 58 MMHG | WEIGHT: 159 LBS

## 2024-11-01 DIAGNOSIS — K65.0 PERIHEPATIC ABSCESS (H): Primary | ICD-10-CM

## 2024-11-01 PROCEDURE — 250N000013 HC RX MED GY IP 250 OP 250 PS 637: Performed by: INTERNAL MEDICINE

## 2024-11-01 PROCEDURE — 250N000011 HC RX IP 250 OP 636: Performed by: PHYSICIAN ASSISTANT

## 2024-11-01 RX ORDER — HEPARIN SODIUM (PORCINE) LOCK FLUSH IV SOLN 100 UNIT/ML 100 UNIT/ML
5 SOLUTION INTRAVENOUS
Status: CANCELLED | OUTPATIENT
Start: 2024-11-02

## 2024-11-01 RX ORDER — EPINEPHRINE 1 MG/ML
0.3 INJECTION, SOLUTION INTRAMUSCULAR; SUBCUTANEOUS EVERY 5 MIN PRN
Status: CANCELLED | OUTPATIENT
Start: 2024-11-02

## 2024-11-01 RX ORDER — ALBUTEROL SULFATE 90 UG/1
1-2 INHALANT RESPIRATORY (INHALATION)
Status: CANCELLED
Start: 2024-11-02

## 2024-11-01 RX ORDER — MEPERIDINE HYDROCHLORIDE 25 MG/ML
25 INJECTION INTRAMUSCULAR; INTRAVENOUS; SUBCUTANEOUS
Status: CANCELLED | OUTPATIENT
Start: 2024-11-02

## 2024-11-01 RX ORDER — DIPHENHYDRAMINE HYDROCHLORIDE 50 MG/ML
50 INJECTION INTRAMUSCULAR; INTRAVENOUS
Status: CANCELLED
Start: 2024-11-02

## 2024-11-01 RX ORDER — DIPHENHYDRAMINE HYDROCHLORIDE 50 MG/ML
25 INJECTION INTRAMUSCULAR; INTRAVENOUS
Status: CANCELLED
Start: 2024-11-02

## 2024-11-01 RX ORDER — HEPARIN SODIUM,PORCINE 10 UNIT/ML
5-20 VIAL (ML) INTRAVENOUS DAILY PRN
Status: CANCELLED | OUTPATIENT
Start: 2024-11-02

## 2024-11-01 RX ORDER — METHYLPREDNISOLONE SODIUM SUCCINATE 40 MG/ML
40 INJECTION INTRAMUSCULAR; INTRAVENOUS
Status: CANCELLED
Start: 2024-11-02

## 2024-11-01 RX ORDER — ALBUTEROL SULFATE 0.83 MG/ML
2.5 SOLUTION RESPIRATORY (INHALATION)
Status: CANCELLED | OUTPATIENT
Start: 2024-11-02

## 2024-11-01 RX ADMIN — Medication 1 TABLET: at 10:55

## 2024-11-01 RX ADMIN — CARBIDOPA AND LEVODOPA 1 TABLET: 25; 250 TABLET ORAL at 10:55

## 2024-11-01 RX ADMIN — DOCUSATE SODIUM 100 MG: 100 CAPSULE, LIQUID FILLED ORAL at 10:55

## 2024-11-01 RX ADMIN — METHOCARBAMOL 750 MG: 750 TABLET ORAL at 10:54

## 2024-11-01 RX ADMIN — ERTAPENEM SODIUM 1 G: 1 INJECTION, POWDER, LYOPHILIZED, FOR SOLUTION INTRAMUSCULAR; INTRAVENOUS at 13:07

## 2024-11-01 RX ADMIN — METHOCARBAMOL 750 MG: 750 TABLET ORAL at 13:11

## 2024-11-01 ASSESSMENT — ACTIVITIES OF DAILY LIVING (ADL)
ADLS_ACUITY_SCORE: 0

## 2024-11-01 NOTE — PROGRESS NOTES
[facility]     Received request for benefit check should pt require home IV abx. Pt only has Medicare, which does not cover IV ABX in the home. (Pt would have coverage for short term TCU or IC).      Below is what pt would be responsible for if pt wanted to go w  home infusion:  Drug would go to Part-D (pt would be responsible for the co-pay per dispense)  Pt would have to self-pay for the per-maxx (daily)  If not homebound, nursing would also be self-pay (per visit)     Cost for ertapenem 1g q24 is $83.09/day for drug and supplies.     For nursing, patient should have coverage if homebound, however [facility] is not contracted with Medicare and an outside nursing agency would be utilized instead. If patient is not homebound, there is no coverage and [facility] can see patient if patient agrees to self-pay for $90 per visit.    I met with pt and his wife, Maryellen to introduce home infusion and review benefits. He states that the out of pocket price is cost-prohibitive and he would like to discharge to the infusion center. Email sent to pt's care coordinator, Sera to update on pt's preference.      Thank you     Carina Donohue RN  [facility] Liaison  [phone] (Mon thru Fri 8am - 5pm)  [phone] Office

## 2024-11-01 NOTE — PLAN OF CARE
23:00-07:30  Goal Outcome Evaluation:  Orientation: A/Ox4   Aggression Stop Light: Green   Activity: SBA  Diet/BS Checks: Mod Carb diet   Tele:  NA   IV Access/Drains: R. PICC CDI SL.   Pain Management: Aramis pain   Abnormal VS/Results: VSS on RA   Bowel/Bladder: Continent B/B using urinal   Skin/Wounds: Generalized bruising & scabbing.   Consults: Hematology & Oncology   D/C Disposition: Tomorrow pending HomeCare IV infusion scheduling.

## 2024-11-01 NOTE — CONSULTS
"Care Management Discharge Note    Discharge Date: 11/01/2024       Discharge Disposition: Home, outpatient infusion    Discharge Services: None    Discharge DME: None    Discharge Transportation: public transportation    Private pay costs discussed:  patient met with home infusion does not want to do private pay needs outpatient chair preferably [facility], [facility] and [facility] as last option    Does the patient's insurance plan have a 3 day qualifying hospital stay waiver?  No    PAS Confirmation Code:n/a    Patient/family educated on Medicare website which has current facility and service quality ratings:  no    Education Provided on the Discharge Plan:  yes  Persons Notified of Discharge Plans: patient, rounding MD  Patient/Family in Agreement with the Plan: yes    Handoff Referral Completed: No, handoff not indicated or clinically appropriate    Additional Information:  Writer received a message from [facility] Liaison that the patient is asking for outpatient infusion center. Patient preference is [facility], [facility] and [facility] as last resort.  Writer called [place] [facility] center they have some availability, however they require the \"outpatient therapy\" order to schedule the patient.   Writer has an email out to CTS Manager for further assistance for scheduling.  Writer will update patient, bedside and rounding provider as more is known.    Addendum: 11/1/2024 11:01  Writer updated Patient, Spouse and Provider that outpatient infusion appointments are entered. No further needs identified.      Ewa Chapa RN, BSN, AC   Care Transitions Specialist  [facility]  Care Transitions Specialist  [address] [place]. [address]  [email or web address]  Office: [phone] Fax: [phone]  [facility]         "

## 2024-11-01 NOTE — PROGRESS NOTES
Pt discharged around 1635 viz v/c. Wife is ride home and ordered and uber for herself and pt. Pt discharged home, AVS and medications sent with. Infusion dates set up and pt is aware, PICC in place. Belongings sent home with pt.

## 2024-11-01 NOTE — PLAN OF CARE
Goal Outcome Evaluation:  Orientation: AnOx4   Aggression Stop Light: Green   Activity: A1 GBW   Diet/BS Checks: Mod Carb diet   Tele:  NA   IV Access/Drains: R. PICC CDI SL.   Pain Management: Aramis pain   Abnormal VS/Results: VSS on RA   Bowel/Bladder: Continent B/B   Skin/Wounds: Generalized bruising & scabbing.   Consults: Hematology & Oncology   D/C Disposition: Tomorrow pending HomeCare IV infusion scheduling.   Other Info:

## 2024-11-01 NOTE — PLAN OF CARE
Occupational Therapy Discharge Summary    Reason for therapy discharge:    Discharged to home. Expected discharge home today.    Progress towards therapy goal(s). See goals on Care Plan in [facility] electronic health record for goal details.  Goals partially met.  Barriers to achieving goals:   discharge from facility.    Therapy recommendation(s):      Continued therapy is recommended.  Rationale/Recommendations:   .OT Rationale for DC Rec: Pt. appears to making progress, continues to be limited by pain, weakness, and decreased activity tolerance. Pt.would continue to benefit from skilled OT services to increase I/ADL indep./safety prior to discharge; Anticipate once medically stable, pt. will able to d/c home w/ assist from spouse for safe  I/ADL completion.    Writing therapist did not treat pt, note written based on previous treating therapist notes and recommendations. Please see chart and flow sheet for additional details.

## 2024-11-01 NOTE — PROGRESS NOTES
Plan of Care  Signed     Date of Service: 11/1/2024  6:48 AM  Creation Time: 11/1/2024  6:48 AM       Nursing shift [id number] shift  Orientation: A/Ox4   Aggression Stop Light: Green   Activity: SBA.. Walks to bathroom with walker  Diet/BS Checks: tolerating Mod Carb diet   Tele:  NA   IV Access/Drains: R. PICC CDI SL.   Pain Management: Denies pain except for RUQ abd incisonal site which was stated a s being tolerable  Abnormal VS/Results: VSS on RA   Bowel/Bladder: Continent B/B using urinal   Skin/Wounds: Generalized bruising & scabbing.   Consults: Hematology & Oncology   D/C Disposition: HomeCare IV infusion scheduling finally obtained from CC/SW  Pt discharged on dandy shift

## 2024-11-01 NOTE — PROGRESS NOTES
Hospitalist chart check    Chart reviewed. Plan had been to discharge home yesterday with IV antibiotics.  Antibiotics via home infusion was cost prohibitive, so arrangements have now been made for outpatient IV antibiotics to complete treatment course as outlined per ID.  Abdominal drain was removed per IR on 10/31/24.  PICC line was placed in RUE.    I saw patient this morning, spouse at bedside.  Patient resting comfortably.  Denies complaints at present.  Questions answered.  Will proceed with discharge home today.    Please see discharge summary completed per Dr. Rebeca Oliveira on 10/31/2024 for specifics of hospital stay.    Astrid Whittington,   Internal Medicine - Hospitalist  11/1/2024  12:06 PM

## 2024-11-01 NOTE — PLAN OF CARE
"Physical Therapy Discharge Summary    Reason for therapy discharge:    Discharged to home.    Progress towards therapy goal(s). [facility] for goal details.  Goals not met.  Barriers to achieving goals:   discharge from facility.    Therapy recommendation(s):    Continued therapy is recommended.  Rationale/Recommendations:  Per prior PT notes, \"Pt currently A x 1 with FWW for functional mobility. Recommend DC to home w/ assist from family for household tasks. Use of FWW for all mobility. Recommend HHPT to progress strength, balance, activity tolerance as pt below baseline at this time. Pt in agreement w/ HHPT\".    Patient not seen by this therapist on this date, discharge summary written based on chart review and previous PT notes. Please see PT flowsheets for further details.        "

## 2024-11-01 NOTE — PROGRESS NOTES
"Chart reviewed per LOS protocol    Ht: 5'9\"  IBW: 72.7 kg  Wt hx:  Wt Readings from Last 10 Encounters:   10/24/24 72.1 kg (159 lb)   10/20/23 67.6 kg (149 lb)   03/24/17 82.6 kg (182 lb)   09/15/16 81.2 kg (179 lb)   08/23/16 81.2 kg (179 lb)   01/06/16 81.6 kg (180 lb)   06/01/15 80.3 kg (177 lb)   05/27/15 81.2 kg (179 lb)   05/19/15 81.6 kg (180 lb)   03/06/15 79.4 kg (175 lb)     9/4/24: 76.1 kg (167 lb)   5/29/24: 73.7 kg (162 lb)   2/7/24: 69.9 kg (154 lb)       Diet: Moderate CHO  Per flowsheets, pt consuming %    Ready for discharge pending confirmation of outpatient IV abx infusion site      Monika Weeks RD, LD  Clinical Dietitian - [facility]   Pager - [phone]    "

## 2024-11-02 ENCOUNTER — INFUSION THERAPY VISIT (OUTPATIENT)
Dept: INFUSION THERAPY | Facility: CLINIC | Age: 74
End: 2024-11-02
Attending: PHYSICIAN ASSISTANT
Payer: COMMERCIAL

## 2024-11-02 VITALS
SYSTOLIC BLOOD PRESSURE: 105 MMHG | TEMPERATURE: 97.6 F | HEART RATE: 102 BPM | DIASTOLIC BLOOD PRESSURE: 71 MMHG | OXYGEN SATURATION: 97 % | RESPIRATION RATE: 16 BRPM

## 2024-11-02 DIAGNOSIS — K65.0 PERIHEPATIC ABSCESS (H): Primary | ICD-10-CM

## 2024-11-02 LAB — BACTERIA ABSC ANAEROBE+AEROBE CULT: ABNORMAL

## 2024-11-02 PROCEDURE — 96374 THER/PROPH/DIAG INJ IV PUSH: CPT

## 2024-11-02 PROCEDURE — 250N000011 HC RX IP 250 OP 636: Performed by: PHYSICIAN ASSISTANT

## 2024-11-02 RX ORDER — ALBUTEROL SULFATE 0.83 MG/ML
2.5 SOLUTION RESPIRATORY (INHALATION)
Status: CANCELLED | OUTPATIENT
Start: 2024-11-03

## 2024-11-02 RX ORDER — HEPARIN SODIUM,PORCINE 10 UNIT/ML
5-20 VIAL (ML) INTRAVENOUS DAILY PRN
Status: DISCONTINUED | OUTPATIENT
Start: 2024-11-02 | End: 2024-11-02 | Stop reason: HOSPADM

## 2024-11-02 RX ORDER — DIPHENHYDRAMINE HYDROCHLORIDE 50 MG/ML
50 INJECTION INTRAMUSCULAR; INTRAVENOUS
Status: CANCELLED
Start: 2024-11-03

## 2024-11-02 RX ORDER — EPINEPHRINE 1 MG/ML
0.3 INJECTION, SOLUTION INTRAMUSCULAR; SUBCUTANEOUS EVERY 5 MIN PRN
Status: CANCELLED | OUTPATIENT
Start: 2024-11-03

## 2024-11-02 RX ORDER — HEPARIN SODIUM,PORCINE 10 UNIT/ML
5-20 VIAL (ML) INTRAVENOUS DAILY PRN
Status: CANCELLED | OUTPATIENT
Start: 2024-11-03

## 2024-11-02 RX ORDER — HEPARIN SODIUM (PORCINE) LOCK FLUSH IV SOLN 100 UNIT/ML 100 UNIT/ML
5 SOLUTION INTRAVENOUS
Status: CANCELLED | OUTPATIENT
Start: 2024-11-03

## 2024-11-02 RX ORDER — MEPERIDINE HYDROCHLORIDE 25 MG/ML
25 INJECTION INTRAMUSCULAR; INTRAVENOUS; SUBCUTANEOUS
Status: CANCELLED | OUTPATIENT
Start: 2024-11-03

## 2024-11-02 RX ORDER — ALBUTEROL SULFATE 90 UG/1
1-2 INHALANT RESPIRATORY (INHALATION)
Status: CANCELLED
Start: 2024-11-03

## 2024-11-02 RX ORDER — DIPHENHYDRAMINE HYDROCHLORIDE 50 MG/ML
25 INJECTION INTRAMUSCULAR; INTRAVENOUS
Status: CANCELLED
Start: 2024-11-03

## 2024-11-02 RX ORDER — METHYLPREDNISOLONE SODIUM SUCCINATE 40 MG/ML
40 INJECTION INTRAMUSCULAR; INTRAVENOUS
Status: CANCELLED
Start: 2024-11-03

## 2024-11-02 RX ADMIN — ERTAPENEM SODIUM 1 G: 1 INJECTION, POWDER, LYOPHILIZED, FOR SOLUTION INTRAMUSCULAR; INTRAVENOUS at 13:34

## 2024-11-02 NOTE — PROGRESS NOTES
Infusion Nursing Note:  Lamine Davisen presents today for invanz.    Patient seen by provider today: No   present during visit today: Not Applicable.    Note: Pt reports no new health changes or concerns today.      Intravenous Access:  PICC.    Treatment Conditions:  Not Applicable.      Post Infusion Assessment:  Patient tolerated infusion without incident.  Site patent and intact, free from redness, edema or discomfort.  No evidence of extravasations.       Discharge Plan:   Patient and/or family verbalized understanding of discharge instructions and all questions answered.  AVS to patient via [employer].  Patient will return tomorrow for next appointment.   Patient discharged in stable condition accompanied by: self.  Departure Mode: Ambulatory.      Lorraine Monahan RN

## 2024-11-03 ENCOUNTER — INFUSION THERAPY VISIT (OUTPATIENT)
Dept: INFUSION THERAPY | Facility: CLINIC | Age: 74
End: 2024-11-03
Attending: PHYSICIAN ASSISTANT
Payer: COMMERCIAL

## 2024-11-03 VITALS
SYSTOLIC BLOOD PRESSURE: 119 MMHG | RESPIRATION RATE: 16 BRPM | HEART RATE: 110 BPM | DIASTOLIC BLOOD PRESSURE: 76 MMHG | TEMPERATURE: 97.4 F | OXYGEN SATURATION: 96 %

## 2024-11-03 DIAGNOSIS — K65.0 PERIHEPATIC ABSCESS (H): Primary | ICD-10-CM

## 2024-11-03 PROCEDURE — 96374 THER/PROPH/DIAG INJ IV PUSH: CPT

## 2024-11-03 PROCEDURE — 250N000011 HC RX IP 250 OP 636: Performed by: PHYSICIAN ASSISTANT

## 2024-11-03 RX ORDER — MEPERIDINE HYDROCHLORIDE 25 MG/ML
25 INJECTION INTRAMUSCULAR; INTRAVENOUS; SUBCUTANEOUS
Status: CANCELLED | OUTPATIENT
Start: 2024-11-04

## 2024-11-03 RX ORDER — DIPHENHYDRAMINE HYDROCHLORIDE 50 MG/ML
25 INJECTION INTRAMUSCULAR; INTRAVENOUS
Status: CANCELLED
Start: 2024-11-04

## 2024-11-03 RX ORDER — DIPHENHYDRAMINE HYDROCHLORIDE 50 MG/ML
50 INJECTION INTRAMUSCULAR; INTRAVENOUS
Status: CANCELLED
Start: 2024-11-04

## 2024-11-03 RX ORDER — HEPARIN SODIUM,PORCINE 10 UNIT/ML
5-20 VIAL (ML) INTRAVENOUS DAILY PRN
Status: DISCONTINUED | OUTPATIENT
Start: 2024-11-03 | End: 2024-11-03 | Stop reason: HOSPADM

## 2024-11-03 RX ORDER — EPINEPHRINE 1 MG/ML
0.3 INJECTION, SOLUTION INTRAMUSCULAR; SUBCUTANEOUS EVERY 5 MIN PRN
Status: CANCELLED | OUTPATIENT
Start: 2024-11-04

## 2024-11-03 RX ORDER — ALBUTEROL SULFATE 90 UG/1
1-2 INHALANT RESPIRATORY (INHALATION)
Status: CANCELLED
Start: 2024-11-04

## 2024-11-03 RX ORDER — ALBUTEROL SULFATE 0.83 MG/ML
2.5 SOLUTION RESPIRATORY (INHALATION)
Status: CANCELLED | OUTPATIENT
Start: 2024-11-04

## 2024-11-03 RX ORDER — HEPARIN SODIUM (PORCINE) LOCK FLUSH IV SOLN 100 UNIT/ML 100 UNIT/ML
5 SOLUTION INTRAVENOUS
Status: CANCELLED | OUTPATIENT
Start: 2024-11-04

## 2024-11-03 RX ORDER — HEPARIN SODIUM,PORCINE 10 UNIT/ML
5-20 VIAL (ML) INTRAVENOUS DAILY PRN
Status: CANCELLED | OUTPATIENT
Start: 2024-11-04

## 2024-11-03 RX ORDER — METHYLPREDNISOLONE SODIUM SUCCINATE 40 MG/ML
40 INJECTION INTRAMUSCULAR; INTRAVENOUS
Status: CANCELLED
Start: 2024-11-04

## 2024-11-03 RX ADMIN — ERTAPENEM SODIUM 1 G: 1 INJECTION, POWDER, LYOPHILIZED, FOR SOLUTION INTRAMUSCULAR; INTRAVENOUS at 13:03

## 2024-11-03 NOTE — PROGRESS NOTES
Infusion Nursing Note:  Lamine Davisen presents today for Invanz.    Patient seen by provider today: No   present during visit today: Not Applicable.    Note: Pt reports no new health changes or concerns today.      Intravenous Access:  PICC.    Treatment Conditions:  Not Applicable.      Post Infusion Assessment:  Patient tolerated infusion without incident.  Blood return noted pre and post infusion.  Site patent and intact, free from redness, edema or discomfort.  No evidence of extravasations.  Access discontinued per protocol.       Discharge Plan:   Discharge instructions reviewed with: Patient.  Patient and/or family verbalized understanding of discharge instructions and all questions answered.  AVS to patient via [employer].  Patient will return 11/4/24 for next appointment.   Patient discharged in stable condition accompanied by: self.  Departure Mode: Ambulatory.      Ana Rajput RN

## 2024-11-04 ENCOUNTER — INFUSION THERAPY VISIT (OUTPATIENT)
Dept: INFUSION THERAPY | Facility: CLINIC | Age: 74
End: 2024-11-04
Payer: COMMERCIAL

## 2024-11-04 VITALS
OXYGEN SATURATION: 97 % | RESPIRATION RATE: 16 BRPM | DIASTOLIC BLOOD PRESSURE: 73 MMHG | TEMPERATURE: 97.3 F | SYSTOLIC BLOOD PRESSURE: 103 MMHG | HEART RATE: 105 BPM

## 2024-11-04 DIAGNOSIS — K65.0 PERIHEPATIC ABSCESS (H): Primary | ICD-10-CM

## 2024-11-04 LAB
AST SERPL W P-5'-P-CCNC: 121 U/L (ref 0–45)
BASOPHILS # BLD AUTO: 0.1 10E3/UL (ref 0–0.2)
BASOPHILS NFR BLD AUTO: 2 %
CREAT SERPL-MCNC: 0.79 MG/DL (ref 0.67–1.17)
CRP SERPL-MCNC: 12.9 MG/L
EGFRCR SERPLBLD CKD-EPI 2021: >90 ML/MIN/1.73M2
EOSINOPHIL # BLD AUTO: 1.7 10E3/UL (ref 0–0.7)
EOSINOPHIL NFR BLD AUTO: 19 %
ERYTHROCYTE [DISTWIDTH] IN BLOOD BY AUTOMATED COUNT: 14.9 % (ref 10–15)
HCT VFR BLD AUTO: 30 % (ref 40–53)
HGB BLD-MCNC: 9.7 G/DL (ref 13.3–17.7)
IMM GRANULOCYTES # BLD: 0 10E3/UL
IMM GRANULOCYTES NFR BLD: 0 %
LYMPHOCYTES # BLD AUTO: 2.4 10E3/UL (ref 0.8–5.3)
LYMPHOCYTES NFR BLD AUTO: 28 %
MCH RBC QN AUTO: 31.2 PG (ref 26.5–33)
MCHC RBC AUTO-ENTMCNC: 32.3 G/DL (ref 31.5–36.5)
MCV RBC AUTO: 97 FL (ref 78–100)
MONOCYTES # BLD AUTO: 0.8 10E3/UL (ref 0–1.3)
MONOCYTES NFR BLD AUTO: 10 %
NEUTROPHILS # BLD AUTO: 3.6 10E3/UL (ref 1.6–8.3)
NEUTROPHILS NFR BLD AUTO: 42 %
NRBC # BLD AUTO: 0 10E3/UL
NRBC BLD AUTO-RTO: 0 /100
PLATELET # BLD AUTO: 513 10E3/UL (ref 150–450)
RBC # BLD AUTO: 3.11 10E6/UL (ref 4.4–5.9)
WBC # BLD AUTO: 8.7 10E3/UL (ref 4–11)

## 2024-11-04 PROCEDURE — 84450 TRANSFERASE (AST) (SGOT): CPT | Performed by: PHYSICIAN ASSISTANT

## 2024-11-04 PROCEDURE — 96374 THER/PROPH/DIAG INJ IV PUSH: CPT

## 2024-11-04 PROCEDURE — 250N000011 HC RX IP 250 OP 636: Performed by: PHYSICIAN ASSISTANT

## 2024-11-04 PROCEDURE — 86140 C-REACTIVE PROTEIN: CPT | Performed by: PHYSICIAN ASSISTANT

## 2024-11-04 PROCEDURE — 82565 ASSAY OF CREATININE: CPT | Performed by: PHYSICIAN ASSISTANT

## 2024-11-04 PROCEDURE — 85004 AUTOMATED DIFF WBC COUNT: CPT | Performed by: PHYSICIAN ASSISTANT

## 2024-11-04 RX ORDER — HEPARIN SODIUM,PORCINE 10 UNIT/ML
5-20 VIAL (ML) INTRAVENOUS DAILY PRN
Status: CANCELLED | OUTPATIENT
Start: 2024-11-05

## 2024-11-04 RX ORDER — ALBUTEROL SULFATE 0.83 MG/ML
2.5 SOLUTION RESPIRATORY (INHALATION)
Status: CANCELLED | OUTPATIENT
Start: 2024-11-05

## 2024-11-04 RX ORDER — DIPHENHYDRAMINE HYDROCHLORIDE 50 MG/ML
25 INJECTION INTRAMUSCULAR; INTRAVENOUS
Status: CANCELLED
Start: 2024-11-05

## 2024-11-04 RX ORDER — METHYLPREDNISOLONE SODIUM SUCCINATE 40 MG/ML
40 INJECTION INTRAMUSCULAR; INTRAVENOUS
Status: CANCELLED
Start: 2024-11-05

## 2024-11-04 RX ORDER — HEPARIN SODIUM (PORCINE) LOCK FLUSH IV SOLN 100 UNIT/ML 100 UNIT/ML
5 SOLUTION INTRAVENOUS
Status: CANCELLED | OUTPATIENT
Start: 2024-11-05

## 2024-11-04 RX ORDER — EPINEPHRINE 1 MG/ML
0.3 INJECTION, SOLUTION INTRAMUSCULAR; SUBCUTANEOUS EVERY 5 MIN PRN
Status: CANCELLED | OUTPATIENT
Start: 2024-11-05

## 2024-11-04 RX ORDER — ALBUTEROL SULFATE 90 UG/1
1-2 INHALANT RESPIRATORY (INHALATION)
Status: CANCELLED
Start: 2024-11-05

## 2024-11-04 RX ORDER — MEPERIDINE HYDROCHLORIDE 25 MG/ML
25 INJECTION INTRAMUSCULAR; INTRAVENOUS; SUBCUTANEOUS
Status: CANCELLED | OUTPATIENT
Start: 2024-11-05

## 2024-11-04 RX ORDER — DIPHENHYDRAMINE HYDROCHLORIDE 50 MG/ML
50 INJECTION INTRAMUSCULAR; INTRAVENOUS
Status: CANCELLED
Start: 2024-11-05

## 2024-11-04 RX ADMIN — ERTAPENEM SODIUM 1 G: 1 INJECTION, POWDER, LYOPHILIZED, FOR SOLUTION INTRAMUSCULAR; INTRAVENOUS at 14:15

## 2024-11-04 ASSESSMENT — PAIN SCALES - GENERAL: PAINLEVEL_OUTOF10: NO PAIN (0)

## 2024-11-04 NOTE — PROGRESS NOTES
Infusion Nursing Note:  Lamine Samano presents today for Invanz.    Patient seen by provider today: No   present during visit today: Not Applicable.    Note: Patient reports no new concerns since invanz infusion yesterday. Denies nausea, bowel concerns, fevers or rash. Weekly labs drawn today as there is no CRP or AST done in the past week.      Intravenous Access:  Labs drawn without difficulty.   PICC.    Treatment Conditions:  Not Applicable. Labs drawn today still pending at time of discharge.      Post Infusion Assessment:  Patient tolerated infusion without incident.  Blood return noted pre and post infusion.  Site patent and intact, free from redness, edema or discomfort.  No evidence of extravasations.       Discharge Plan:   Discharge instructions reviewed with: Patient.  Patient and/or family verbalized understanding of discharge instructions and all questions answered.  AVS to patient via [email or web address].  Patient will return 11/5/24 for next appointment.   Patient discharged in stable condition accompanied by: self.  Departure Mode: Ambulatory.      Joanna Hernandez RN

## 2024-11-05 ENCOUNTER — INFUSION THERAPY VISIT (OUTPATIENT)
Dept: INFUSION THERAPY | Facility: CLINIC | Age: 74
End: 2024-11-05
Payer: COMMERCIAL

## 2024-11-05 VITALS
RESPIRATION RATE: 20 BRPM | TEMPERATURE: 97.6 F | HEART RATE: 97 BPM | SYSTOLIC BLOOD PRESSURE: 108 MMHG | DIASTOLIC BLOOD PRESSURE: 71 MMHG | OXYGEN SATURATION: 96 %

## 2024-11-05 DIAGNOSIS — K65.0 PERIHEPATIC ABSCESS (H): Primary | ICD-10-CM

## 2024-11-05 PROCEDURE — 96374 THER/PROPH/DIAG INJ IV PUSH: CPT

## 2024-11-05 PROCEDURE — 250N000011 HC RX IP 250 OP 636: Performed by: PHYSICIAN ASSISTANT

## 2024-11-05 RX ORDER — ALBUTEROL SULFATE 0.83 MG/ML
2.5 SOLUTION RESPIRATORY (INHALATION)
Status: CANCELLED | OUTPATIENT
Start: 2024-11-06

## 2024-11-05 RX ORDER — EPINEPHRINE 1 MG/ML
0.3 INJECTION, SOLUTION INTRAMUSCULAR; SUBCUTANEOUS EVERY 5 MIN PRN
Status: CANCELLED | OUTPATIENT
Start: 2024-11-06

## 2024-11-05 RX ORDER — MEPERIDINE HYDROCHLORIDE 25 MG/ML
25 INJECTION INTRAMUSCULAR; INTRAVENOUS; SUBCUTANEOUS
Status: CANCELLED | OUTPATIENT
Start: 2024-11-06

## 2024-11-05 RX ORDER — DIPHENHYDRAMINE HYDROCHLORIDE 50 MG/ML
50 INJECTION INTRAMUSCULAR; INTRAVENOUS
Status: CANCELLED
Start: 2024-11-06

## 2024-11-05 RX ORDER — HEPARIN SODIUM,PORCINE 10 UNIT/ML
5-20 VIAL (ML) INTRAVENOUS DAILY PRN
Status: CANCELLED | OUTPATIENT
Start: 2024-11-06

## 2024-11-05 RX ORDER — DIPHENHYDRAMINE HYDROCHLORIDE 50 MG/ML
25 INJECTION INTRAMUSCULAR; INTRAVENOUS
Status: CANCELLED
Start: 2024-11-06

## 2024-11-05 RX ORDER — HEPARIN SODIUM (PORCINE) LOCK FLUSH IV SOLN 100 UNIT/ML 100 UNIT/ML
5 SOLUTION INTRAVENOUS
Status: CANCELLED | OUTPATIENT
Start: 2024-11-06

## 2024-11-05 RX ORDER — METHYLPREDNISOLONE SODIUM SUCCINATE 40 MG/ML
40 INJECTION INTRAMUSCULAR; INTRAVENOUS
Status: CANCELLED
Start: 2024-11-06

## 2024-11-05 RX ORDER — ALBUTEROL SULFATE 90 UG/1
1-2 INHALANT RESPIRATORY (INHALATION)
Status: CANCELLED
Start: 2024-11-06

## 2024-11-05 RX ADMIN — ERTAPENEM SODIUM 1 G: 1 INJECTION, POWDER, LYOPHILIZED, FOR SOLUTION INTRAMUSCULAR; INTRAVENOUS at 14:15

## 2024-11-05 NOTE — PROGRESS NOTES
Infusion Nursing Note:  Lamine Samano presents today for Invanz.    Patient seen by provider today: No   present during visit today: Not Applicable.    Note: N/A.      Intravenous Access:  PICC.    Treatment Conditions:  Not Applicable.      Post Infusion Assessment:  Patient tolerated infusion without incident.  Blood return noted pre and post infusion.  Site patent and intact, free from redness, edema or discomfort.  No evidence of extravasations.       Discharge Plan:   Patient declined prescription refills.  Discharge instructions reviewed with: Patient.  Patient verbalized understanding of discharge instructions and all questions answered.  AVS to patient via [email or web address].  Patient will return 11/6/24 for next appointment.   Patient discharged in stable condition accompanied by: self.  Departure Mode: Ambulatory.      Anusha Mirza RN

## 2024-11-06 ENCOUNTER — INFUSION THERAPY VISIT (OUTPATIENT)
Dept: INFUSION THERAPY | Facility: CLINIC | Age: 74
End: 2024-11-06
Attending: PHYSICIAN ASSISTANT
Payer: COMMERCIAL

## 2024-11-06 VITALS
HEART RATE: 90 BPM | TEMPERATURE: 97.4 F | DIASTOLIC BLOOD PRESSURE: 73 MMHG | SYSTOLIC BLOOD PRESSURE: 107 MMHG | OXYGEN SATURATION: 96 % | RESPIRATION RATE: 16 BRPM

## 2024-11-06 DIAGNOSIS — K65.0 PERIHEPATIC ABSCESS (H): Primary | ICD-10-CM

## 2024-11-06 PROCEDURE — 96374 THER/PROPH/DIAG INJ IV PUSH: CPT

## 2024-11-06 PROCEDURE — 250N000011 HC RX IP 250 OP 636: Performed by: PHYSICIAN ASSISTANT

## 2024-11-06 RX ORDER — EPINEPHRINE 1 MG/ML
0.3 INJECTION, SOLUTION INTRAMUSCULAR; SUBCUTANEOUS EVERY 5 MIN PRN
Status: CANCELLED | OUTPATIENT
Start: 2024-11-07

## 2024-11-06 RX ORDER — ALBUTEROL SULFATE 90 UG/1
1-2 INHALANT RESPIRATORY (INHALATION)
Status: CANCELLED
Start: 2024-11-07

## 2024-11-06 RX ORDER — METHYLPREDNISOLONE SODIUM SUCCINATE 40 MG/ML
40 INJECTION INTRAMUSCULAR; INTRAVENOUS
Status: CANCELLED
Start: 2024-11-07

## 2024-11-06 RX ORDER — ALBUTEROL SULFATE 0.83 MG/ML
2.5 SOLUTION RESPIRATORY (INHALATION)
Status: CANCELLED | OUTPATIENT
Start: 2024-11-07

## 2024-11-06 RX ORDER — MEPERIDINE HYDROCHLORIDE 25 MG/ML
25 INJECTION INTRAMUSCULAR; INTRAVENOUS; SUBCUTANEOUS
Status: CANCELLED | OUTPATIENT
Start: 2024-11-07

## 2024-11-06 RX ORDER — HEPARIN SODIUM,PORCINE 10 UNIT/ML
5-20 VIAL (ML) INTRAVENOUS DAILY PRN
Status: CANCELLED | OUTPATIENT
Start: 2024-11-07

## 2024-11-06 RX ORDER — HEPARIN SODIUM (PORCINE) LOCK FLUSH IV SOLN 100 UNIT/ML 100 UNIT/ML
5 SOLUTION INTRAVENOUS
Status: CANCELLED | OUTPATIENT
Start: 2024-11-07

## 2024-11-06 RX ORDER — DIPHENHYDRAMINE HYDROCHLORIDE 50 MG/ML
25 INJECTION INTRAMUSCULAR; INTRAVENOUS
Status: CANCELLED
Start: 2024-11-07

## 2024-11-06 RX ORDER — DIPHENHYDRAMINE HYDROCHLORIDE 50 MG/ML
50 INJECTION INTRAMUSCULAR; INTRAVENOUS
Status: CANCELLED
Start: 2024-11-07

## 2024-11-06 RX ADMIN — ERTAPENEM SODIUM 1 G: 1 INJECTION, POWDER, LYOPHILIZED, FOR SOLUTION INTRAMUSCULAR; INTRAVENOUS at 14:32

## 2024-11-06 ASSESSMENT — PAIN SCALES - GENERAL: PAINLEVEL_OUTOF10: NO PAIN (0)

## 2024-11-06 NOTE — PROGRESS NOTES
Infusion Nursing Note:  Lamine Samano presents today for Invanz.    Patient seen by provider today: No   present during visit today: Not Applicable.    Note: Prior to patient's arrival during chart review, writer noted labs from 11/4 with elevated AST of 121. Reviewed with pharmacy, ok to proceed with invanz today per Polly Guerra, but to follow up with ordering provider as ertapenem can cause some hepatic dysfunction. Contacted Dr. Casas's office and spoke with QUTEA Sumner. Orders as follows per QUETA Sumner/Dr. Casas:    Ok to proceed with invanz with elevated AST of 121  Patient to have ALT and AST rechecked 11/8 prior to infusion appointment    Dr. Casas also wanted to ensure patient is not having any allergic reaction symptoms due to elevated eosinophils. Discussed with patient, he denies any itching, rash, throat tightness, or lip swelling. Denies any other new concerns today. Reviewed above plan with patient. Patient will go to [facility] outpatient lab on Friday morning to have labs drawn prior to infusion appointment at 3:30pm. QUETA Sumner will enter orders to be drawn at outpatient lab. Patient to call Dr. Casas's office with any further questions or concerns prior to Friday.      Intravenous Access:  PICC.    Treatment Conditions:  Lab Results   Component Value Date    HGB 9.7 (L) 11/04/2024    WBC 8.7 11/04/2024    ANEUTAUTO 3.6 11/04/2024     (H) 11/04/2024        Lab Results   Component Value Date     (L) 10/31/2024    POTASSIUM 3.4 10/31/2024    MAG 1.2 (L) 10/24/2024    CR 0.79 11/04/2024    JORGE 8.2 (L) 10/31/2024    BILITOTAL 1.2 10/25/2024    ALBUMIN 2.7 (L) 10/25/2024    ALT 5 10/25/2024     (H) 11/04/2024     Results reviewed, see note above.      Post Infusion Assessment:  Patient tolerated infusion without incident.  Blood return noted pre and post infusion.  Site patent and intact, free from redness, edema or discomfort.  No evidence of extravasations.        Discharge Plan:   Discharge instructions reviewed with: Patient.  Patient and/or family verbalized understanding of discharge instructions and all questions answered.  Copy of AVS reviewed with patient and/or family.  Patient will return 11/7/24 for next appointment.  Patient discharged in stable condition accompanied by: self.  Departure Mode: Ambulatory.  Face to Face time: 35 minutes.      Joanna Hernandez RN

## 2024-11-06 NOTE — PATIENT INSTRUCTIONS
Prior to your infusion appointment on 11/8 at 3:30pm, go to [facility] outpatient lab located in the [facility] to have your labs done. This will have to be drawn from your arm as unfortunately they do not use PICC lines.     Please call Dr. Casas's office (QUETA Sumner) at [phone] with any questions or concerns prior to then.

## 2024-11-07 ENCOUNTER — INFUSION THERAPY VISIT (OUTPATIENT)
Dept: INFUSION THERAPY | Facility: CLINIC | Age: 74
End: 2024-11-07
Attending: PHYSICIAN ASSISTANT
Payer: COMMERCIAL

## 2024-11-07 VITALS
RESPIRATION RATE: 16 BRPM | HEART RATE: 105 BPM | OXYGEN SATURATION: 95 % | DIASTOLIC BLOOD PRESSURE: 78 MMHG | TEMPERATURE: 97.7 F | SYSTOLIC BLOOD PRESSURE: 124 MMHG

## 2024-11-07 DIAGNOSIS — K65.0 PERIHEPATIC ABSCESS (H): Primary | ICD-10-CM

## 2024-11-07 PROCEDURE — 250N000011 HC RX IP 250 OP 636: Performed by: PHYSICIAN ASSISTANT

## 2024-11-07 PROCEDURE — 96374 THER/PROPH/DIAG INJ IV PUSH: CPT

## 2024-11-07 RX ORDER — MEPERIDINE HYDROCHLORIDE 25 MG/ML
25 INJECTION INTRAMUSCULAR; INTRAVENOUS; SUBCUTANEOUS
Status: CANCELLED | OUTPATIENT
Start: 2024-11-08

## 2024-11-07 RX ORDER — HEPARIN SODIUM (PORCINE) LOCK FLUSH IV SOLN 100 UNIT/ML 100 UNIT/ML
5 SOLUTION INTRAVENOUS
Status: CANCELLED | OUTPATIENT
Start: 2024-11-08

## 2024-11-07 RX ORDER — HEPARIN SODIUM (PORCINE) LOCK FLUSH IV SOLN 100 UNIT/ML 100 UNIT/ML
5 SOLUTION INTRAVENOUS
Status: DISCONTINUED | OUTPATIENT
Start: 2024-11-07 | End: 2024-11-07 | Stop reason: HOSPADM

## 2024-11-07 RX ORDER — EPINEPHRINE 1 MG/ML
0.3 INJECTION, SOLUTION INTRAMUSCULAR; SUBCUTANEOUS EVERY 5 MIN PRN
Status: CANCELLED | OUTPATIENT
Start: 2024-11-08

## 2024-11-07 RX ORDER — METHYLPREDNISOLONE SODIUM SUCCINATE 40 MG/ML
40 INJECTION INTRAMUSCULAR; INTRAVENOUS
Status: CANCELLED
Start: 2024-11-08

## 2024-11-07 RX ORDER — DIPHENHYDRAMINE HYDROCHLORIDE 50 MG/ML
50 INJECTION INTRAMUSCULAR; INTRAVENOUS
Status: CANCELLED
Start: 2024-11-08

## 2024-11-07 RX ORDER — ALBUTEROL SULFATE 90 UG/1
1-2 INHALANT RESPIRATORY (INHALATION)
Status: CANCELLED
Start: 2024-11-08

## 2024-11-07 RX ORDER — HEPARIN SODIUM,PORCINE 10 UNIT/ML
5-20 VIAL (ML) INTRAVENOUS DAILY PRN
Status: CANCELLED | OUTPATIENT
Start: 2024-11-08

## 2024-11-07 RX ORDER — DIPHENHYDRAMINE HYDROCHLORIDE 50 MG/ML
25 INJECTION INTRAMUSCULAR; INTRAVENOUS
Status: CANCELLED
Start: 2024-11-08

## 2024-11-07 RX ORDER — ALBUTEROL SULFATE 0.83 MG/ML
2.5 SOLUTION RESPIRATORY (INHALATION)
Status: CANCELLED | OUTPATIENT
Start: 2024-11-08

## 2024-11-07 RX ADMIN — ERTAPENEM SODIUM 1 G: 1 INJECTION, POWDER, LYOPHILIZED, FOR SOLUTION INTRAMUSCULAR; INTRAVENOUS at 14:40

## 2024-11-07 NOTE — PROGRESS NOTES
Infusion Nursing Note:  Lamine G Selwyn presents today for invanz and dressing change.    Patient seen by provider today: No   present during visit today: Not Applicable.    Note: N/A.      Intravenous Access:  PICC.    Treatment Conditions:  Not Applicable.      Post Infusion Assessment:  Patient tolerated infusion without incident.  Blood return noted pre and post infusion.  Site patent and intact, free from redness, edema or discomfort.  No evidence of extravasations.       Discharge Plan:   AVS to patient via [employer].  Patient will return 11/8 for next appointment.   Patient discharged in stable condition accompanied by: self.  Departure Mode: Ambulatory.      Marcel Hampton RN

## 2024-11-08 ENCOUNTER — INFUSION THERAPY VISIT (OUTPATIENT)
Dept: INFUSION THERAPY | Facility: CLINIC | Age: 74
End: 2024-11-08
Attending: PHYSICIAN ASSISTANT
Payer: COMMERCIAL

## 2024-11-08 ENCOUNTER — LAB (OUTPATIENT)
Dept: LAB | Facility: CLINIC | Age: 74
End: 2024-11-08
Payer: COMMERCIAL

## 2024-11-08 VITALS
SYSTOLIC BLOOD PRESSURE: 125 MMHG | TEMPERATURE: 97.9 F | HEART RATE: 82 BPM | RESPIRATION RATE: 16 BRPM | OXYGEN SATURATION: 97 % | DIASTOLIC BLOOD PRESSURE: 58 MMHG

## 2024-11-08 DIAGNOSIS — K75.0 LIVER ABSCESS: ICD-10-CM

## 2024-11-08 DIAGNOSIS — K65.0 PERIHEPATIC ABSCESS (H): Primary | ICD-10-CM

## 2024-11-08 LAB
ALT SERPL W P-5'-P-CCNC: 14 U/L (ref 0–70)
AST SERPL W P-5'-P-CCNC: 78 U/L (ref 0–45)

## 2024-11-08 PROCEDURE — 84460 ALANINE AMINO (ALT) (SGPT): CPT

## 2024-11-08 PROCEDURE — 250N000011 HC RX IP 250 OP 636: Performed by: PHYSICIAN ASSISTANT

## 2024-11-08 PROCEDURE — 36415 COLL VENOUS BLD VENIPUNCTURE: CPT

## 2024-11-08 PROCEDURE — 84450 TRANSFERASE (AST) (SGOT): CPT

## 2024-11-08 PROCEDURE — 96374 THER/PROPH/DIAG INJ IV PUSH: CPT

## 2024-11-08 RX ORDER — DIPHENHYDRAMINE HYDROCHLORIDE 50 MG/ML
50 INJECTION INTRAMUSCULAR; INTRAVENOUS
Status: CANCELLED
Start: 2024-11-09

## 2024-11-08 RX ORDER — HEPARIN SODIUM,PORCINE 10 UNIT/ML
5-20 VIAL (ML) INTRAVENOUS DAILY PRN
Status: CANCELLED | OUTPATIENT
Start: 2024-11-09

## 2024-11-08 RX ORDER — HEPARIN SODIUM (PORCINE) LOCK FLUSH IV SOLN 100 UNIT/ML 100 UNIT/ML
5 SOLUTION INTRAVENOUS
Status: CANCELLED | OUTPATIENT
Start: 2024-11-09

## 2024-11-08 RX ORDER — METHYLPREDNISOLONE SODIUM SUCCINATE 40 MG/ML
40 INJECTION INTRAMUSCULAR; INTRAVENOUS
Status: CANCELLED
Start: 2024-11-09

## 2024-11-08 RX ORDER — MEPERIDINE HYDROCHLORIDE 25 MG/ML
25 INJECTION INTRAMUSCULAR; INTRAVENOUS; SUBCUTANEOUS
Status: CANCELLED | OUTPATIENT
Start: 2024-11-09

## 2024-11-08 RX ORDER — DIPHENHYDRAMINE HYDROCHLORIDE 50 MG/ML
25 INJECTION INTRAMUSCULAR; INTRAVENOUS
Status: CANCELLED
Start: 2024-11-09

## 2024-11-08 RX ORDER — ALBUTEROL SULFATE 90 UG/1
1-2 INHALANT RESPIRATORY (INHALATION)
Status: CANCELLED
Start: 2024-11-09

## 2024-11-08 RX ORDER — EPINEPHRINE 1 MG/ML
0.3 INJECTION, SOLUTION INTRAMUSCULAR; SUBCUTANEOUS EVERY 5 MIN PRN
Status: CANCELLED | OUTPATIENT
Start: 2024-11-09

## 2024-11-08 RX ORDER — ALBUTEROL SULFATE 0.83 MG/ML
2.5 SOLUTION RESPIRATORY (INHALATION)
Status: CANCELLED | OUTPATIENT
Start: 2024-11-09

## 2024-11-08 RX ADMIN — ERTAPENEM SODIUM 1 G: 1 INJECTION, POWDER, LYOPHILIZED, FOR SOLUTION INTRAMUSCULAR; INTRAVENOUS at 15:32

## 2024-11-08 ASSESSMENT — PAIN SCALES - GENERAL: PAINLEVEL_OUTOF10: NO PAIN (0)

## 2024-11-08 NOTE — PROGRESS NOTES
Infusion Nursing Note:  Lamine Davisen presents today for invanz.    Patient seen by provider today: No   present during visit today: Not Applicable.    Note: pt denies any changes in health or new concerns.      Intravenous Access:  PICC.    Treatment Conditions:  Not Applicable.      Post Infusion Assessment:  Patient tolerated infusion without incident.  Site patent and intact, free from redness, edema or discomfort.  No evidence of extravasations.       Discharge Plan:   Patient and/or family verbalized understanding of discharge instructions and all questions answered.  AVS to patient via [employer].  Patient will return tomorrow for next appointment.   Patient discharged in stable condition accompanied by: self.  Departure Mode: Ambulatory.      Lorraine Monahan RN

## 2024-11-09 ENCOUNTER — INFUSION THERAPY VISIT (OUTPATIENT)
Dept: INFUSION THERAPY | Facility: CLINIC | Age: 74
End: 2024-11-09
Attending: FAMILY MEDICINE
Payer: COMMERCIAL

## 2024-11-09 VITALS
HEART RATE: 97 BPM | TEMPERATURE: 97.5 F | SYSTOLIC BLOOD PRESSURE: 132 MMHG | RESPIRATION RATE: 16 BRPM | DIASTOLIC BLOOD PRESSURE: 93 MMHG | OXYGEN SATURATION: 96 %

## 2024-11-09 DIAGNOSIS — K65.0 PERIHEPATIC ABSCESS (H): Primary | ICD-10-CM

## 2024-11-09 PROCEDURE — 250N000011 HC RX IP 250 OP 636: Performed by: PHYSICIAN ASSISTANT

## 2024-11-09 PROCEDURE — 96374 THER/PROPH/DIAG INJ IV PUSH: CPT

## 2024-11-09 RX ORDER — MEPERIDINE HYDROCHLORIDE 25 MG/ML
25 INJECTION INTRAMUSCULAR; INTRAVENOUS; SUBCUTANEOUS
Status: CANCELLED | OUTPATIENT
Start: 2024-11-10

## 2024-11-09 RX ORDER — METHYLPREDNISOLONE SODIUM SUCCINATE 40 MG/ML
40 INJECTION INTRAMUSCULAR; INTRAVENOUS
Status: CANCELLED
Start: 2024-11-10

## 2024-11-09 RX ORDER — ALBUTEROL SULFATE 90 UG/1
1-2 INHALANT RESPIRATORY (INHALATION)
Status: CANCELLED
Start: 2024-11-10

## 2024-11-09 RX ORDER — HEPARIN SODIUM (PORCINE) LOCK FLUSH IV SOLN 100 UNIT/ML 100 UNIT/ML
5 SOLUTION INTRAVENOUS
Status: CANCELLED | OUTPATIENT
Start: 2024-11-10

## 2024-11-09 RX ORDER — ALBUTEROL SULFATE 0.83 MG/ML
2.5 SOLUTION RESPIRATORY (INHALATION)
Status: CANCELLED | OUTPATIENT
Start: 2024-11-10

## 2024-11-09 RX ORDER — DIPHENHYDRAMINE HYDROCHLORIDE 50 MG/ML
25 INJECTION INTRAMUSCULAR; INTRAVENOUS
Status: CANCELLED
Start: 2024-11-10

## 2024-11-09 RX ORDER — HEPARIN SODIUM,PORCINE 10 UNIT/ML
5-20 VIAL (ML) INTRAVENOUS DAILY PRN
Status: CANCELLED | OUTPATIENT
Start: 2024-11-10

## 2024-11-09 RX ORDER — EPINEPHRINE 1 MG/ML
0.3 INJECTION, SOLUTION INTRAMUSCULAR; SUBCUTANEOUS EVERY 5 MIN PRN
Status: CANCELLED | OUTPATIENT
Start: 2024-11-10

## 2024-11-09 RX ORDER — DIPHENHYDRAMINE HYDROCHLORIDE 50 MG/ML
50 INJECTION INTRAMUSCULAR; INTRAVENOUS
Status: CANCELLED
Start: 2024-11-10

## 2024-11-09 RX ORDER — HEPARIN SODIUM,PORCINE 10 UNIT/ML
5-20 VIAL (ML) INTRAVENOUS DAILY PRN
Status: DISCONTINUED | OUTPATIENT
Start: 2024-11-09 | End: 2024-11-09 | Stop reason: HOSPADM

## 2024-11-09 RX ADMIN — ERTAPENEM SODIUM 1 G: 1 INJECTION, POWDER, LYOPHILIZED, FOR SOLUTION INTRAMUSCULAR; INTRAVENOUS at 11:30

## 2024-11-09 NOTE — PROGRESS NOTES
Infusion Nursing Note:  Lamine Samano presents today for Invanz.    Patient seen by provider today: No   present during visit today: Not Applicable.    Note: N/A.      Intravenous Access:  PICC.    Treatment Conditions:  Not Applicable.      Post Infusion Assessment:  Patient tolerated infusion without incident.  Blood return noted pre and post infusion.  Site patent and intact, free from redness, edema or discomfort.  No evidence of extravasations.       Discharge Plan:   Discharge instructions reviewed with: Patient.  Patient and/or family verbalized understanding of discharge instructions and all questions answered.  Patient discharged in stable condition accompanied by: self.  Departure Mode: Ambulatory.      Donnie Hernandez RN

## 2024-11-10 ENCOUNTER — INFUSION THERAPY VISIT (OUTPATIENT)
Dept: INFUSION THERAPY | Facility: CLINIC | Age: 74
End: 2024-11-10
Attending: FAMILY MEDICINE
Payer: COMMERCIAL

## 2024-11-10 VITALS
DIASTOLIC BLOOD PRESSURE: 68 MMHG | SYSTOLIC BLOOD PRESSURE: 113 MMHG | RESPIRATION RATE: 18 BRPM | HEART RATE: 91 BPM | OXYGEN SATURATION: 97 % | TEMPERATURE: 96.9 F

## 2024-11-10 DIAGNOSIS — K65.0 PERIHEPATIC ABSCESS (H): Primary | ICD-10-CM

## 2024-11-10 PROCEDURE — 96374 THER/PROPH/DIAG INJ IV PUSH: CPT

## 2024-11-10 PROCEDURE — 250N000011 HC RX IP 250 OP 636: Performed by: PHYSICIAN ASSISTANT

## 2024-11-10 RX ORDER — ALBUTEROL SULFATE 90 UG/1
1-2 INHALANT RESPIRATORY (INHALATION)
Status: CANCELLED
Start: 2024-11-11

## 2024-11-10 RX ORDER — HEPARIN SODIUM,PORCINE 10 UNIT/ML
5-20 VIAL (ML) INTRAVENOUS DAILY PRN
Status: CANCELLED | OUTPATIENT
Start: 2024-11-11

## 2024-11-10 RX ORDER — DIPHENHYDRAMINE HYDROCHLORIDE 50 MG/ML
50 INJECTION INTRAMUSCULAR; INTRAVENOUS
Status: CANCELLED
Start: 2024-11-11

## 2024-11-10 RX ORDER — METHYLPREDNISOLONE SODIUM SUCCINATE 40 MG/ML
40 INJECTION INTRAMUSCULAR; INTRAVENOUS
Status: CANCELLED
Start: 2024-11-11

## 2024-11-10 RX ORDER — ALBUTEROL SULFATE 0.83 MG/ML
2.5 SOLUTION RESPIRATORY (INHALATION)
Status: CANCELLED | OUTPATIENT
Start: 2024-11-11

## 2024-11-10 RX ORDER — DIPHENHYDRAMINE HYDROCHLORIDE 50 MG/ML
25 INJECTION INTRAMUSCULAR; INTRAVENOUS
Status: CANCELLED
Start: 2024-11-11

## 2024-11-10 RX ORDER — HEPARIN SODIUM (PORCINE) LOCK FLUSH IV SOLN 100 UNIT/ML 100 UNIT/ML
5 SOLUTION INTRAVENOUS
Status: CANCELLED | OUTPATIENT
Start: 2024-11-11

## 2024-11-10 RX ORDER — MEPERIDINE HYDROCHLORIDE 25 MG/ML
25 INJECTION INTRAMUSCULAR; INTRAVENOUS; SUBCUTANEOUS
Status: CANCELLED | OUTPATIENT
Start: 2024-11-11

## 2024-11-10 RX ORDER — HEPARIN SODIUM (PORCINE) LOCK FLUSH IV SOLN 100 UNIT/ML 100 UNIT/ML
5 SOLUTION INTRAVENOUS
Status: DISCONTINUED | OUTPATIENT
Start: 2024-11-10 | End: 2024-11-10 | Stop reason: HOSPADM

## 2024-11-10 RX ORDER — EPINEPHRINE 1 MG/ML
0.3 INJECTION, SOLUTION INTRAMUSCULAR; SUBCUTANEOUS EVERY 5 MIN PRN
Status: CANCELLED | OUTPATIENT
Start: 2024-11-11

## 2024-11-10 RX ORDER — HEPARIN SODIUM,PORCINE 10 UNIT/ML
5-20 VIAL (ML) INTRAVENOUS DAILY PRN
Status: DISCONTINUED | OUTPATIENT
Start: 2024-11-10 | End: 2024-11-10 | Stop reason: HOSPADM

## 2024-11-10 RX ADMIN — ERTAPENEM SODIUM 1 G: 1 INJECTION, POWDER, LYOPHILIZED, FOR SOLUTION INTRAMUSCULAR; INTRAVENOUS at 12:26

## 2024-11-10 NOTE — PROGRESS NOTES
Infusion Nursing Note:  Lamine Samano presents today for invanz.    Patient seen by provider today: No   present during visit today: Not Applicable.    Note: N/A.      Intravenous Access:  PICC.    Treatment Conditions:  Not Applicable.      Post Infusion Assessment:  Patient tolerated infusion without incident.  Blood return noted pre and post infusion.  Site patent and intact, free from redness, edema or discomfort.  No evidence of extravasations.       Discharge Plan:   Discharge instructions reviewed with: Patient.  Patient and/or family verbalized understanding of discharge instructions and all questions answered.  Patient discharged in stable condition accompanied by: self.  Departure Mode: Ambulatory.      Mindy Lindsey RN

## 2024-11-11 ENCOUNTER — INFUSION THERAPY VISIT (OUTPATIENT)
Dept: INFUSION THERAPY | Facility: CLINIC | Age: 74
End: 2024-11-11
Attending: FAMILY MEDICINE
Payer: COMMERCIAL

## 2024-11-11 VITALS
RESPIRATION RATE: 18 BRPM | HEART RATE: 84 BPM | TEMPERATURE: 97.5 F | SYSTOLIC BLOOD PRESSURE: 134 MMHG | DIASTOLIC BLOOD PRESSURE: 82 MMHG

## 2024-11-11 DIAGNOSIS — K65.0 PERIHEPATIC ABSCESS (H): Primary | ICD-10-CM

## 2024-11-11 LAB
AST SERPL W P-5'-P-CCNC: 65 U/L (ref 0–45)
CREAT SERPL-MCNC: 0.94 MG/DL (ref 0.67–1.17)
CRP SERPL-MCNC: 5.66 MG/L
EGFRCR SERPLBLD CKD-EPI 2021: 85 ML/MIN/1.73M2

## 2024-11-11 PROCEDURE — 82565 ASSAY OF CREATININE: CPT | Performed by: PHYSICIAN ASSISTANT

## 2024-11-11 PROCEDURE — 84450 TRANSFERASE (AST) (SGOT): CPT | Performed by: PHYSICIAN ASSISTANT

## 2024-11-11 PROCEDURE — 85027 COMPLETE CBC AUTOMATED: CPT | Performed by: PHYSICIAN ASSISTANT

## 2024-11-11 PROCEDURE — 85007 BL SMEAR W/DIFF WBC COUNT: CPT | Performed by: PHYSICIAN ASSISTANT

## 2024-11-11 PROCEDURE — 250N000011 HC RX IP 250 OP 636: Performed by: PHYSICIAN ASSISTANT

## 2024-11-11 PROCEDURE — 86140 C-REACTIVE PROTEIN: CPT | Performed by: PHYSICIAN ASSISTANT

## 2024-11-11 PROCEDURE — 96374 THER/PROPH/DIAG INJ IV PUSH: CPT

## 2024-11-11 RX ORDER — MEPERIDINE HYDROCHLORIDE 25 MG/ML
25 INJECTION INTRAMUSCULAR; INTRAVENOUS; SUBCUTANEOUS
Status: CANCELLED | OUTPATIENT
Start: 2024-11-12

## 2024-11-11 RX ORDER — ALBUTEROL SULFATE 90 UG/1
1-2 INHALANT RESPIRATORY (INHALATION)
Status: CANCELLED
Start: 2024-11-12

## 2024-11-11 RX ORDER — DIPHENHYDRAMINE HYDROCHLORIDE 50 MG/ML
50 INJECTION INTRAMUSCULAR; INTRAVENOUS
Status: CANCELLED
Start: 2024-11-12

## 2024-11-11 RX ORDER — DIPHENHYDRAMINE HYDROCHLORIDE 50 MG/ML
25 INJECTION INTRAMUSCULAR; INTRAVENOUS
Status: CANCELLED
Start: 2024-11-12

## 2024-11-11 RX ORDER — EPINEPHRINE 1 MG/ML
0.3 INJECTION, SOLUTION INTRAMUSCULAR; SUBCUTANEOUS EVERY 5 MIN PRN
Status: CANCELLED | OUTPATIENT
Start: 2024-11-12

## 2024-11-11 RX ORDER — HEPARIN SODIUM,PORCINE 10 UNIT/ML
5-20 VIAL (ML) INTRAVENOUS DAILY PRN
Status: CANCELLED | OUTPATIENT
Start: 2024-11-12

## 2024-11-11 RX ORDER — METHYLPREDNISOLONE SODIUM SUCCINATE 40 MG/ML
40 INJECTION INTRAMUSCULAR; INTRAVENOUS
Status: CANCELLED
Start: 2024-11-12

## 2024-11-11 RX ORDER — ALBUTEROL SULFATE 0.83 MG/ML
2.5 SOLUTION RESPIRATORY (INHALATION)
Status: CANCELLED | OUTPATIENT
Start: 2024-11-12

## 2024-11-11 RX ORDER — HEPARIN SODIUM (PORCINE) LOCK FLUSH IV SOLN 100 UNIT/ML 100 UNIT/ML
5 SOLUTION INTRAVENOUS
Status: CANCELLED | OUTPATIENT
Start: 2024-11-12

## 2024-11-11 RX ADMIN — ERTAPENEM SODIUM 1 G: 1 INJECTION, POWDER, LYOPHILIZED, FOR SOLUTION INTRAMUSCULAR; INTRAVENOUS at 15:43

## 2024-11-11 NOTE — PROGRESS NOTES
Infusion Nursing Note:  Lamine Samano presents today for ***.    Patient seen by provider today: {YES (EXPLAIN)/NO:110403}   present during visit today: {UMHLANGUAGE:941550}    Note: {Not Applicable or free text:169264:s}.      Intravenous Access:  {UMHIVACCESS:886598}    Treatment Conditions:  {UMHTXCONDITIONS:088288}      Post Infusion Assessment:  {UMHPOSTINFUSION:142022}       Discharge Plan:   {UMHDISCHARGE:675142}      Mindy Lindsey RN

## 2024-11-11 NOTE — PROGRESS NOTES
Infusion Nursing Note:  Lamine Davisen presents today for invanz.    Patient seen by provider today: No   present during visit today: Not Applicable.    Note: No blood return initially. Repositioned patient and had him raise his right arm. Good blood return then noted and labs drawn through PICC line.      Intravenous Access:  Labs drawn.  PICC.    Treatment Conditions:  Not Applicable.      Post Infusion Assessment:  Patient tolerated infusion without incident.  Blood return noted pre and post infusion.  Site patent and intact, free from redness, edema or discomfort.  No evidence of extravasations.       Discharge Plan:   Discharge instructions reviewed with: Patient.  Patient and/or family verbalized understanding of discharge instructions and all questions answered.  Patient discharged in stable condition accompanied by: self.  Departure Mode: Ambulatory.  Patient at [facility] tomorrow.      Mindy Lindsey RN

## 2024-11-12 LAB
BASOPHILS # BLD MANUAL: 0.4 10E3/UL (ref 0–0.2)
BASOPHILS NFR BLD MANUAL: 4 %
EOSINOPHIL # BLD MANUAL: 3.9 10E3/UL (ref 0–0.7)
EOSINOPHIL NFR BLD MANUAL: 44 %
ERYTHROCYTE [DISTWIDTH] IN BLOOD BY AUTOMATED COUNT: 14.8 % (ref 10–15)
HCT VFR BLD AUTO: 30 % (ref 40–53)
HGB BLD-MCNC: 10.2 G/DL (ref 13.3–17.7)
LYMPHOCYTES # BLD MANUAL: 2.3 10E3/UL (ref 0.8–5.3)
LYMPHOCYTES NFR BLD MANUAL: 26 %
MCH RBC QN AUTO: 32.6 PG (ref 26.5–33)
MCHC RBC AUTO-ENTMCNC: 34 G/DL (ref 31.5–36.5)
MCV RBC AUTO: 96 FL (ref 78–100)
MONOCYTES # BLD MANUAL: 0.6 10E3/UL (ref 0–1.3)
MONOCYTES NFR BLD MANUAL: 7 %
NEUTROPHILS # BLD MANUAL: 1.7 10E3/UL (ref 1.6–8.3)
NEUTROPHILS NFR BLD MANUAL: 19 %
PATH REV: ABNORMAL
PLAT MORPH BLD: ABNORMAL
PLATELET # BLD AUTO: 299 10E3/UL (ref 150–450)
RBC # BLD AUTO: 3.13 10E6/UL (ref 4.4–5.9)
RBC MORPH BLD: ABNORMAL
SMUDGE CELLS BLD QL SMEAR: PRESENT
WBC # BLD AUTO: 8.8 10E3/UL (ref 4–11)

## 2024-11-13 ENCOUNTER — INFUSION THERAPY VISIT (OUTPATIENT)
Dept: INFUSION THERAPY | Facility: CLINIC | Age: 74
End: 2024-11-13
Attending: FAMILY MEDICINE
Payer: COMMERCIAL

## 2024-11-13 VITALS
HEART RATE: 66 BPM | TEMPERATURE: 97.6 F | OXYGEN SATURATION: 97 % | SYSTOLIC BLOOD PRESSURE: 150 MMHG | DIASTOLIC BLOOD PRESSURE: 81 MMHG | RESPIRATION RATE: 16 BRPM

## 2024-11-13 DIAGNOSIS — K65.0 PERIHEPATIC ABSCESS (H): Primary | ICD-10-CM

## 2024-11-13 PROCEDURE — 250N000011 HC RX IP 250 OP 636: Performed by: PHYSICIAN ASSISTANT

## 2024-11-13 PROCEDURE — 96374 THER/PROPH/DIAG INJ IV PUSH: CPT

## 2024-11-13 RX ORDER — ALBUTEROL SULFATE 90 UG/1
1-2 INHALANT RESPIRATORY (INHALATION)
Status: CANCELLED
Start: 2024-11-14

## 2024-11-13 RX ORDER — HEPARIN SODIUM (PORCINE) LOCK FLUSH IV SOLN 100 UNIT/ML 100 UNIT/ML
5 SOLUTION INTRAVENOUS
Status: CANCELLED | OUTPATIENT
Start: 2024-11-14

## 2024-11-13 RX ORDER — ALBUTEROL SULFATE 0.83 MG/ML
2.5 SOLUTION RESPIRATORY (INHALATION)
Status: CANCELLED | OUTPATIENT
Start: 2024-11-14

## 2024-11-13 RX ORDER — METHYLPREDNISOLONE SODIUM SUCCINATE 40 MG/ML
40 INJECTION INTRAMUSCULAR; INTRAVENOUS
Status: CANCELLED
Start: 2024-11-14

## 2024-11-13 RX ORDER — EPINEPHRINE 1 MG/ML
0.3 INJECTION, SOLUTION INTRAMUSCULAR; SUBCUTANEOUS EVERY 5 MIN PRN
Status: CANCELLED | OUTPATIENT
Start: 2024-11-14

## 2024-11-13 RX ORDER — DIPHENHYDRAMINE HYDROCHLORIDE 50 MG/ML
50 INJECTION INTRAMUSCULAR; INTRAVENOUS
Status: CANCELLED
Start: 2024-11-14

## 2024-11-13 RX ORDER — DIPHENHYDRAMINE HYDROCHLORIDE 50 MG/ML
25 INJECTION INTRAMUSCULAR; INTRAVENOUS
Status: CANCELLED
Start: 2024-11-14

## 2024-11-13 RX ORDER — MEPERIDINE HYDROCHLORIDE 25 MG/ML
25 INJECTION INTRAMUSCULAR; INTRAVENOUS; SUBCUTANEOUS
Status: CANCELLED | OUTPATIENT
Start: 2024-11-14

## 2024-11-13 RX ORDER — HEPARIN SODIUM,PORCINE 10 UNIT/ML
5-20 VIAL (ML) INTRAVENOUS DAILY PRN
Status: CANCELLED | OUTPATIENT
Start: 2024-11-14

## 2024-11-13 RX ADMIN — ERTAPENEM SODIUM 1 G: 1 INJECTION, POWDER, LYOPHILIZED, FOR SOLUTION INTRAMUSCULAR; INTRAVENOUS at 16:03

## 2024-11-13 ASSESSMENT — PAIN SCALES - GENERAL: PAINLEVEL_OUTOF10: NO PAIN (0)

## 2024-11-13 NOTE — PROGRESS NOTES
Infusion Nursing Note:  Lamine Samano presents today for invanz.    Patient seen by provider today: No   present during visit today: Not Applicable.    Note: N/A.      Intravenous Access:  PICC.    Treatment Conditions:  Not Applicable.      Post Infusion Assessment:  Patient tolerated infusion without incident.  Blood return noted pre and post infusion.  Site patent and intact, free from redness, edema or discomfort.  No evidence of extravasations.       Discharge Plan:   Discharge instructions reviewed with: Patient.  Patient and/or family verbalized understanding of discharge instructions and all questions answered.  AVS to patient via [employer]  Patient will return to [facility] tomorrow for next appointment.   Patient discharged in stable condition accompanied by: self.  Departure Mode: Ambulatory.      Enmanuel Clark RN

## 2024-11-14 ENCOUNTER — ANCILLARY PROCEDURE (OUTPATIENT)
Dept: CT IMAGING | Facility: CLINIC | Age: 74
End: 2024-11-14
Attending: PHYSICIAN ASSISTANT
Payer: COMMERCIAL

## 2024-11-14 ENCOUNTER — INFUSION THERAPY VISIT (OUTPATIENT)
Dept: INFUSION THERAPY | Facility: CLINIC | Age: 74
End: 2024-11-14
Attending: PHYSICIAN ASSISTANT
Payer: COMMERCIAL

## 2024-11-14 VITALS
OXYGEN SATURATION: 94 % | TEMPERATURE: 97.2 F | SYSTOLIC BLOOD PRESSURE: 129 MMHG | HEART RATE: 73 BPM | DIASTOLIC BLOOD PRESSURE: 82 MMHG | RESPIRATION RATE: 18 BRPM

## 2024-11-14 DIAGNOSIS — K75.0 LIVER ABSCESS: ICD-10-CM

## 2024-11-14 DIAGNOSIS — K65.0 PERIHEPATIC ABSCESS (H): Primary | ICD-10-CM

## 2024-11-14 PROCEDURE — 74177 CT ABD & PELVIS W/CONTRAST: CPT

## 2024-11-14 PROCEDURE — 250N000011 HC RX IP 250 OP 636: Performed by: PHYSICIAN ASSISTANT

## 2024-11-14 PROCEDURE — 250N000009 HC RX 250: Performed by: PHYSICIAN ASSISTANT

## 2024-11-14 PROCEDURE — 96374 THER/PROPH/DIAG INJ IV PUSH: CPT

## 2024-11-14 RX ORDER — MEPERIDINE HYDROCHLORIDE 25 MG/ML
25 INJECTION INTRAMUSCULAR; INTRAVENOUS; SUBCUTANEOUS
Status: CANCELLED | OUTPATIENT
Start: 2024-11-15

## 2024-11-14 RX ORDER — DIPHENHYDRAMINE HYDROCHLORIDE 50 MG/ML
50 INJECTION INTRAMUSCULAR; INTRAVENOUS
Status: CANCELLED
Start: 2024-11-15

## 2024-11-14 RX ORDER — HEPARIN SODIUM (PORCINE) LOCK FLUSH IV SOLN 100 UNIT/ML 100 UNIT/ML
5 SOLUTION INTRAVENOUS
Status: CANCELLED | OUTPATIENT
Start: 2024-11-15

## 2024-11-14 RX ORDER — ALBUTEROL SULFATE 0.83 MG/ML
2.5 SOLUTION RESPIRATORY (INHALATION)
Status: CANCELLED | OUTPATIENT
Start: 2024-11-15

## 2024-11-14 RX ORDER — HEPARIN SODIUM,PORCINE 10 UNIT/ML
5-20 VIAL (ML) INTRAVENOUS DAILY PRN
Status: CANCELLED | OUTPATIENT
Start: 2024-11-15

## 2024-11-14 RX ORDER — ALBUTEROL SULFATE 90 UG/1
1-2 INHALANT RESPIRATORY (INHALATION)
Status: CANCELLED
Start: 2024-11-15

## 2024-11-14 RX ORDER — DIPHENHYDRAMINE HYDROCHLORIDE 50 MG/ML
25 INJECTION INTRAMUSCULAR; INTRAVENOUS
Status: CANCELLED
Start: 2024-11-15

## 2024-11-14 RX ORDER — EPINEPHRINE 1 MG/ML
0.3 INJECTION, SOLUTION INTRAMUSCULAR; SUBCUTANEOUS EVERY 5 MIN PRN
Status: CANCELLED | OUTPATIENT
Start: 2024-11-15

## 2024-11-14 RX ORDER — METHYLPREDNISOLONE SODIUM SUCCINATE 40 MG/ML
40 INJECTION INTRAMUSCULAR; INTRAVENOUS
Status: CANCELLED
Start: 2024-11-15

## 2024-11-14 RX ORDER — IOPAMIDOL 755 MG/ML
81 INJECTION, SOLUTION INTRAVASCULAR ONCE
Status: COMPLETED | OUTPATIENT
Start: 2024-11-14 | End: 2024-11-14

## 2024-11-14 RX ADMIN — SODIUM CHLORIDE 40 ML: 9 INJECTION, SOLUTION INTRAVENOUS at 10:40

## 2024-11-14 RX ADMIN — ERTAPENEM SODIUM 1 G: 1 INJECTION, POWDER, LYOPHILIZED, FOR SOLUTION INTRAMUSCULAR; INTRAVENOUS at 14:00

## 2024-11-14 RX ADMIN — IOPAMIDOL 81 ML: 755 INJECTION, SOLUTION INTRAVENOUS at 10:40

## 2024-11-14 NOTE — PROGRESS NOTES
Infusion Nursing Note:  Lamine Samano presents today for ertapenem + PICC dressing change.    Patient seen by provider today: No   present during visit today: Not Applicable.    Note: N/A.      Intravenous Access:  PICC. Dressing changed.    Treatment Conditions:  Not Applicable.      Post Infusion Assessment:  Patient tolerated infusion without incident.  Site patent and intact, free from redness, edema or discomfort.  No evidence of extravasations.      Discharge Plan:   Discharge instructions reviewed with: Patient.  Patient and/or family verbalized understanding of discharge instructions and all questions answered.  AVS to patient via [employer].  Patient will return 11/15 for next appointment.   Patient discharged in stable condition accompanied by: self.  Departure Mode: Ambulatory.      Lorraine Kumar RN

## 2024-11-15 ENCOUNTER — INFUSION THERAPY VISIT (OUTPATIENT)
Dept: INFUSION THERAPY | Facility: CLINIC | Age: 74
End: 2024-11-15
Attending: PHYSICIAN ASSISTANT
Payer: COMMERCIAL

## 2024-11-15 DIAGNOSIS — K65.0 PERIHEPATIC ABSCESS (H): Primary | ICD-10-CM

## 2024-11-15 RX ORDER — HEPARIN SODIUM (PORCINE) LOCK FLUSH IV SOLN 100 UNIT/ML 100 UNIT/ML
5 SOLUTION INTRAVENOUS
OUTPATIENT
Start: 2024-11-16

## 2024-11-15 RX ORDER — METHYLPREDNISOLONE SODIUM SUCCINATE 40 MG/ML
40 INJECTION INTRAMUSCULAR; INTRAVENOUS
Start: 2024-11-16

## 2024-11-15 RX ORDER — DIPHENHYDRAMINE HYDROCHLORIDE 50 MG/ML
50 INJECTION INTRAMUSCULAR; INTRAVENOUS
Start: 2024-11-16

## 2024-11-15 RX ORDER — ALBUTEROL SULFATE 90 UG/1
1-2 INHALANT RESPIRATORY (INHALATION)
Start: 2024-11-16

## 2024-11-15 RX ORDER — ALBUTEROL SULFATE 0.83 MG/ML
2.5 SOLUTION RESPIRATORY (INHALATION)
OUTPATIENT
Start: 2024-11-16

## 2024-11-15 RX ORDER — MEPERIDINE HYDROCHLORIDE 25 MG/ML
25 INJECTION INTRAMUSCULAR; INTRAVENOUS; SUBCUTANEOUS
OUTPATIENT
Start: 2024-11-16

## 2024-11-15 RX ORDER — DIPHENHYDRAMINE HYDROCHLORIDE 50 MG/ML
25 INJECTION INTRAMUSCULAR; INTRAVENOUS
Start: 2024-11-16

## 2024-11-15 RX ORDER — EPINEPHRINE 1 MG/ML
0.3 INJECTION, SOLUTION INTRAMUSCULAR; SUBCUTANEOUS EVERY 5 MIN PRN
OUTPATIENT
Start: 2024-11-16

## 2024-11-15 RX ORDER — HEPARIN SODIUM,PORCINE 10 UNIT/ML
5-20 VIAL (ML) INTRAVENOUS DAILY PRN
OUTPATIENT
Start: 2024-11-16

## 2024-11-15 NOTE — PROGRESS NOTES
Infusion Nursing Note:  Lamine Samano presents today for PICC removal.    Patient seen by provider today: No   present during visit today: Not Applicable.    Note: Per SHERRY Acevedo, hold ertapenem dose today and discontinue PICC line. Education and handout given on care of dressing and to watch for bleeding, bruising, swelling, fevers. Patient expressed understanding. PICC line removed without issue.       Intravenous Access:  PICC.    Treatment Conditions:  Not Applicable.      Discharge Plan:   Discharge instructions reviewed with: Patient and Family.  Patient and/or family verbalized understanding of discharge instructions and all questions answered.  AVS to patient via [employer].  Patient discharged in stable condition accompanied by: self and wife.      Lorraine Kumar RN

## 2024-11-20 NOTE — PROGRESS NOTES
IR scheduling contacted patient to schedule IR abscess tube exchange on 11/11, the patient states he no longer has tube.  Will cancel order.  Viji Kwan RN  IR nurse clinician  [phone]

## 2025-01-09 ENCOUNTER — TRANSFERRED RECORDS (OUTPATIENT)
Dept: HEALTH INFORMATION MANAGEMENT | Facility: CLINIC | Age: 75
End: 2025-01-09
Payer: COMMERCIAL

## 2025-02-09 ENCOUNTER — HEALTH MAINTENANCE LETTER (OUTPATIENT)
Age: 75
End: 2025-02-09

## 2025-03-10 ENCOUNTER — TRANSFERRED RECORDS (OUTPATIENT)
Dept: HEALTH INFORMATION MANAGEMENT | Facility: CLINIC | Age: 75
End: 2025-03-10
Payer: COMMERCIAL

## 2025-04-02 NOTE — PLAN OF CARE
Summary: 1900-0730  Primary Diagnosis: Liver abscess    Orientation: A&O x4  Aggression Stop Light: Green  Activity: A1 GBW  Diet/BS Checks: Mod carb, BG check ACHS, bedtime  and 2AM 109  Tele:  N/A  IV Access/Drains: R PIV SL wit int Abx  Pain Management: Dilaudid PO x1 for rt abd pain  Abnormal VS/Results: VSS on RA ext tachycardia  Bowel/Bladder: Continent of B/B, no BM this shift  Skin/Wounds: scattered bruising and scabs, rash to chest and back  Consults: GI, ID, Oncology  D/C Disposition: Pending  Other Info:  -ASHLIE drain intact, flushed with 10cc NS, has minimal output     Due for eye exam , unable to leave message mailbox is full, message sent to [employer]

## 2025-06-24 ENCOUNTER — TRANSFERRED RECORDS (OUTPATIENT)
Dept: ADMINISTRATIVE | Facility: CLINIC | Age: 75
End: 2025-06-24
Payer: COMMERCIAL

## 2025-06-24 NOTE — PROCEDURES
[facility]   [address], [address]  [place] [address]    Patient Name: Lamine Samano (Gregg) Gender: Male  Exam Date: 2025 Visit Number: [id number]  Age: 74 Years 7 Months : 1950  Attending MD: Jono Dixon MD Medical Record #: [id number]  ______________________________________________________________________________________________  Procedure:    Upper GI Endoscopy   Indications:    Varices, rule out  Provider:        Jono Dixon MD   Referring MD: Patrick D Dreyer DO   Primary MD:      Rangel Kaur MD  Medications:  Admitting Medication:   0.9% Normal Saline at TKO   Intra Procedure Medications:   Patient received monitored anesthesia care.     Complications: No immediate complications  ______________________________________________________________________________________________  Procedure:   An examination of the heart and lungs was performed within acceptable limits.  The patient was therefore deemed a reasonable candidate for endoscopy and monitored anesthesia care.  The risks, benefits and plan were discussed to the patient and/or patient representative and all questions answered. After obtaining informed consent, the patient received monitored anesthesia care and I passed the scope.   Throughout the procedure the patient's blood pressure, pulse and oxygen saturations were monitored.  The scope was introduced through the mouth and advanced to the third portion of duodenum.         Findings:   Esophagus:    Normal esophagus.     The z-line is 39 centimeters from the incisors.  Top of the gastric folds is 39 centimeters from the incisors.  Stomach:    Normal stomach.    The diaphragm hiatus is at 39 centimeters from the incisors.  Duodenum:    Normal duodenum.    Impression:   Secondary esophageal varices without bleeding  MD Impression Comments:  No evidence of esophageal varices.  Would recommend f/u in our hepatology clinic to discuss screening and next eval    Normal upper endoscopy      Plan:      Electronically Signed                                                             Jono Dixon MD   06/24/2025 9:00 AM     Medications:  Medication Dose Sig Description PRN Status PRN Reason Comments   carbidopa 25 mg-levodopa 250 mg tablet 25 mg-250 mg take 1 tablet by oral route 2 times every day N  taking as directed   trazodone 100 mg tablet 100 mg take 1 tablet by ORAL route  every day as needed N  taking as directed   venlafaxine 37.5 mg tablet 37.5 mg take 1 tablet by oral route 2 times every day with food N       Allergies:  Medication Name Ingredient Reaction Comment    LEVOFLOXACIN achilles tendon tear    Penicillin PENICILLIN Rash      Vital Signs:  Date Time Systolic Diastolic Height Weight BMI   06/24/2025 8:46  77 68 in 167.49 25.50     Race:  White  Ethnicity:  Not  or   Preferred Language:  English      cc: Rangel Kaur MD  cc: Patrick D Dreyer DO  cc: Rangel Kaur MD        [facility] [phone]

## (undated) RX ORDER — MORPHINE SULFATE 2 MG/ML
INJECTION, SOLUTION INTRAMUSCULAR; INTRAVENOUS
Status: DISPENSED
Start: 2024-10-25

## (undated) RX ORDER — LIDOCAINE HYDROCHLORIDE 10 MG/ML
INJECTION, SOLUTION INFILTRATION; PERINEURAL
Status: DISPENSED
Start: 2024-10-26

## (undated) RX ORDER — HEPARIN SODIUM (PORCINE) LOCK FLUSH IV SOLN 100 UNIT/ML 100 UNIT/ML
SOLUTION INTRAVENOUS
Status: DISPENSED
Start: 2024-11-14